# Patient Record
Sex: MALE | Race: WHITE | Employment: OTHER | URBAN - METROPOLITAN AREA
[De-identification: names, ages, dates, MRNs, and addresses within clinical notes are randomized per-mention and may not be internally consistent; named-entity substitution may affect disease eponyms.]

---

## 2017-01-04 ENCOUNTER — HOSPITAL ENCOUNTER (OUTPATIENT)
Dept: CARDIOLOGY | Age: 72
Discharge: HOME | End: 2017-01-04
Attending: INTERNAL MEDICINE | Admitting: INTERNAL MEDICINE

## 2017-01-04 DIAGNOSIS — R06.02 SHORTNESS OF BREATH: ICD-10-CM

## 2017-01-04 LAB
LV EF: 38 %
LVEF MODALITY: NORMAL

## 2017-01-10 ENCOUNTER — OFFICE VISIT (OUTPATIENT)
Dept: INTERNAL MEDICINE CLINIC | Age: 72
End: 2017-01-10

## 2017-01-10 VITALS — HEART RATE: 88 BPM | DIASTOLIC BLOOD PRESSURE: 60 MMHG | SYSTOLIC BLOOD PRESSURE: 122 MMHG | RESPIRATION RATE: 16 BRPM

## 2017-01-10 DIAGNOSIS — I50.21 ACUTE SYSTOLIC CONGESTIVE HEART FAILURE (HCC): Primary | ICD-10-CM

## 2017-01-10 PROCEDURE — 99213 OFFICE O/P EST LOW 20 MIN: CPT | Performed by: INTERNAL MEDICINE

## 2017-01-10 ASSESSMENT — PATIENT HEALTH QUESTIONNAIRE - PHQ9
1. LITTLE INTEREST OR PLEASURE IN DOING THINGS: 0
2. FEELING DOWN, DEPRESSED OR HOPELESS: 0
SUM OF ALL RESPONSES TO PHQ9 QUESTIONS 1 & 2: 0
SUM OF ALL RESPONSES TO PHQ QUESTIONS 1-9: 0

## 2017-01-12 ENCOUNTER — OFFICE VISIT (OUTPATIENT)
Dept: CARDIOLOGY CLINIC | Age: 72
End: 2017-01-12

## 2017-01-12 VITALS
DIASTOLIC BLOOD PRESSURE: 68 MMHG | SYSTOLIC BLOOD PRESSURE: 128 MMHG | HEIGHT: 67 IN | BODY MASS INDEX: 30.61 KG/M2 | HEART RATE: 76 BPM | WEIGHT: 195 LBS | RESPIRATION RATE: 16 BRPM

## 2017-01-12 DIAGNOSIS — I25.10 CORONARY ARTERY DISEASE INVOLVING NATIVE CORONARY ARTERY OF NATIVE HEART WITHOUT ANGINA PECTORIS: Primary | ICD-10-CM

## 2017-01-12 PROCEDURE — 99214 OFFICE O/P EST MOD 30 MIN: CPT | Performed by: INTERNAL MEDICINE

## 2017-01-16 ENCOUNTER — HOSPITAL ENCOUNTER (OUTPATIENT)
Dept: OTHER | Age: 72
Discharge: HOME | End: 2017-01-16
Attending: INTERNAL MEDICINE | Admitting: INTERNAL MEDICINE

## 2017-01-16 ENCOUNTER — HOSPITAL ENCOUNTER (OUTPATIENT)
Dept: LAB | Age: 72
Discharge: HOME | End: 2017-01-16
Attending: INTERNAL MEDICINE | Admitting: INTERNAL MEDICINE

## 2017-01-16 DIAGNOSIS — Z01.811 PRE-OP CHEST EXAM: ICD-10-CM

## 2017-01-16 LAB
ANION GAP SERPL CALCULATED.3IONS-SCNC: 10 MMOL/L (ref 4–16)
APTT: 50.3 SECONDS (ref 24–40)
BASOPHILS ABSOLUTE: 0.1 K/CU MM
BASOPHILS RELATIVE PERCENT: 0.8 % (ref 0–1)
BUN BLDV-MCNC: 45 MG/DL (ref 6–23)
CALCIUM SERPL-MCNC: 9.5 MG/DL (ref 8.3–10.6)
CHLORIDE BLD-SCNC: 100 MMOL/L (ref 99–110)
CO2: 27 MMOL/L (ref 21–32)
CREAT SERPL-MCNC: 2 MG/DL (ref 0.9–1.3)
DIFFERENTIAL TYPE: ABNORMAL
EOSINOPHILS ABSOLUTE: 0.1 K/CU MM
EOSINOPHILS RELATIVE PERCENT: 1.4 % (ref 0–3)
GFR AFRICAN AMERICAN: 40 ML/MIN/1.73M2
GFR NON-AFRICAN AMERICAN: 33 ML/MIN/1.73M2
GLUCOSE BLD-MCNC: 141 MG/DL (ref 70–140)
HCT VFR BLD CALC: 37.4 % (ref 42–52)
HEMOGLOBIN: 12.1 GM/DL (ref 13.5–18)
IMMATURE NEUTROPHIL %: 0.3 % (ref 0–0.43)
INR BLD: 1.02 INDEX
LYMPHOCYTES ABSOLUTE: 2 K/CU MM
LYMPHOCYTES RELATIVE PERCENT: 31.2 % (ref 24–44)
MCH RBC QN AUTO: 31.6 PG (ref 27–31)
MCHC RBC AUTO-ENTMCNC: 32.4 % (ref 32–36)
MCV RBC AUTO: 97.7 FL (ref 78–100)
MONOCYTES ABSOLUTE: 0.5 K/CU MM
MONOCYTES RELATIVE PERCENT: 8 % (ref 0–4)
PDW BLD-RTO: 12.8 % (ref 11.7–14.9)
PLATELET # BLD: 224 K/CU MM (ref 140–440)
PMV BLD AUTO: 10.5 FL (ref 7.5–11.1)
POTASSIUM SERPL-SCNC: 4.8 MMOL/L (ref 3.5–5.1)
PROTHROMBIN TIME: 12 SECONDS (ref 10–14.3)
RBC # BLD: 3.83 M/CU MM (ref 4.6–6.2)
SEGMENTED NEUTROPHILS ABSOLUTE COUNT: 3.6 K/CU MM
SEGMENTED NEUTROPHILS RELATIVE PERCENT: 58.3 % (ref 36–66)
SODIUM BLD-SCNC: 137 MMOL/L (ref 135–145)
TOTAL IMMATURE NEUTOROPHIL: 0.02 K/CU MM
WBC # BLD: 6.3 K/CU MM (ref 4–10.5)

## 2017-01-24 ENCOUNTER — TELEPHONE (OUTPATIENT)
Dept: INTERNAL MEDICINE CLINIC | Age: 72
End: 2017-01-24

## 2017-01-25 ENCOUNTER — OFFICE VISIT (OUTPATIENT)
Dept: CARDIOLOGY CLINIC | Age: 72
End: 2017-01-25

## 2017-01-25 VITALS
BODY MASS INDEX: 31.08 KG/M2 | HEIGHT: 67 IN | HEART RATE: 80 BPM | RESPIRATION RATE: 16 BRPM | SYSTOLIC BLOOD PRESSURE: 110 MMHG | DIASTOLIC BLOOD PRESSURE: 72 MMHG | WEIGHT: 198 LBS

## 2017-01-25 DIAGNOSIS — I25.10 CORONARY ARTERY DISEASE INVOLVING NATIVE CORONARY ARTERY OF NATIVE HEART WITHOUT ANGINA PECTORIS: Primary | ICD-10-CM

## 2017-01-25 PROCEDURE — G8419 CALC BMI OUT NRM PARAM NOF/U: HCPCS | Performed by: INTERNAL MEDICINE

## 2017-01-25 PROCEDURE — 4040F PNEUMOC VAC/ADMIN/RCVD: CPT | Performed by: INTERNAL MEDICINE

## 2017-01-25 PROCEDURE — G8484 FLU IMMUNIZE NO ADMIN: HCPCS | Performed by: INTERNAL MEDICINE

## 2017-01-25 PROCEDURE — 1036F TOBACCO NON-USER: CPT | Performed by: INTERNAL MEDICINE

## 2017-01-25 PROCEDURE — G8598 ASA/ANTIPLAT THER USED: HCPCS | Performed by: INTERNAL MEDICINE

## 2017-01-25 PROCEDURE — G8427 DOCREV CUR MEDS BY ELIG CLIN: HCPCS | Performed by: INTERNAL MEDICINE

## 2017-01-25 PROCEDURE — 3017F COLORECTAL CA SCREEN DOC REV: CPT | Performed by: INTERNAL MEDICINE

## 2017-01-25 PROCEDURE — 1123F ACP DISCUSS/DSCN MKR DOCD: CPT | Performed by: INTERNAL MEDICINE

## 2017-01-25 PROCEDURE — 99214 OFFICE O/P EST MOD 30 MIN: CPT | Performed by: INTERNAL MEDICINE

## 2017-02-14 ENCOUNTER — OFFICE VISIT (OUTPATIENT)
Dept: INTERNAL MEDICINE CLINIC | Age: 72
End: 2017-02-14

## 2017-02-14 VITALS
RESPIRATION RATE: 14 BRPM | TEMPERATURE: 97.8 F | BODY MASS INDEX: 31.08 KG/M2 | WEIGHT: 198 LBS | DIASTOLIC BLOOD PRESSURE: 64 MMHG | SYSTOLIC BLOOD PRESSURE: 126 MMHG | HEIGHT: 67 IN | HEART RATE: 72 BPM

## 2017-02-14 DIAGNOSIS — J00 ACUTE NASOPHARYNGITIS: Primary | ICD-10-CM

## 2017-02-14 PROCEDURE — 3017F COLORECTAL CA SCREEN DOC REV: CPT | Performed by: INTERNAL MEDICINE

## 2017-02-14 PROCEDURE — 1123F ACP DISCUSS/DSCN MKR DOCD: CPT | Performed by: INTERNAL MEDICINE

## 2017-02-14 PROCEDURE — 99213 OFFICE O/P EST LOW 20 MIN: CPT | Performed by: INTERNAL MEDICINE

## 2017-02-14 PROCEDURE — G8598 ASA/ANTIPLAT THER USED: HCPCS | Performed by: INTERNAL MEDICINE

## 2017-02-14 PROCEDURE — G8427 DOCREV CUR MEDS BY ELIG CLIN: HCPCS | Performed by: INTERNAL MEDICINE

## 2017-02-14 PROCEDURE — G8484 FLU IMMUNIZE NO ADMIN: HCPCS | Performed by: INTERNAL MEDICINE

## 2017-02-14 PROCEDURE — 1036F TOBACCO NON-USER: CPT | Performed by: INTERNAL MEDICINE

## 2017-02-14 PROCEDURE — 4040F PNEUMOC VAC/ADMIN/RCVD: CPT | Performed by: INTERNAL MEDICINE

## 2017-02-14 PROCEDURE — G8417 CALC BMI ABV UP PARAM F/U: HCPCS | Performed by: INTERNAL MEDICINE

## 2017-02-14 RX ORDER — GUAIFENESIN AND CODEINE PHOSPHATE 100; 10 MG/5ML; MG/5ML
5 SOLUTION ORAL 4 TIMES DAILY PRN
Qty: 120 ML | Refills: 0 | Status: SHIPPED | OUTPATIENT
Start: 2017-02-14 | End: 2017-02-21

## 2017-03-03 RX ORDER — LATANOPROST 50 UG/ML
1 SOLUTION/ DROPS OPHTHALMIC NIGHTLY
Qty: 1 BOTTLE | Refills: 6 | Status: SHIPPED | OUTPATIENT
Start: 2017-03-03

## 2017-03-23 ENCOUNTER — OFFICE VISIT (OUTPATIENT)
Dept: INTERNAL MEDICINE CLINIC | Age: 72
End: 2017-03-23

## 2017-03-23 VITALS — OXYGEN SATURATION: 98 % | SYSTOLIC BLOOD PRESSURE: 118 MMHG | DIASTOLIC BLOOD PRESSURE: 62 MMHG | HEART RATE: 84 BPM

## 2017-03-23 DIAGNOSIS — M25.511 ACUTE PAIN OF RIGHT SHOULDER: Primary | ICD-10-CM

## 2017-03-23 PROCEDURE — 99213 OFFICE O/P EST LOW 20 MIN: CPT | Performed by: NURSE PRACTITIONER

## 2017-03-23 PROCEDURE — 3017F COLORECTAL CA SCREEN DOC REV: CPT | Performed by: NURSE PRACTITIONER

## 2017-03-23 PROCEDURE — 96372 THER/PROPH/DIAG INJ SC/IM: CPT | Performed by: NURSE PRACTITIONER

## 2017-03-23 PROCEDURE — G8598 ASA/ANTIPLAT THER USED: HCPCS | Performed by: NURSE PRACTITIONER

## 2017-03-23 PROCEDURE — 4040F PNEUMOC VAC/ADMIN/RCVD: CPT | Performed by: NURSE PRACTITIONER

## 2017-03-23 PROCEDURE — G8427 DOCREV CUR MEDS BY ELIG CLIN: HCPCS | Performed by: NURSE PRACTITIONER

## 2017-03-23 PROCEDURE — G8417 CALC BMI ABV UP PARAM F/U: HCPCS | Performed by: NURSE PRACTITIONER

## 2017-03-23 PROCEDURE — 1036F TOBACCO NON-USER: CPT | Performed by: NURSE PRACTITIONER

## 2017-03-23 PROCEDURE — 1123F ACP DISCUSS/DSCN MKR DOCD: CPT | Performed by: NURSE PRACTITIONER

## 2017-03-23 PROCEDURE — G8484 FLU IMMUNIZE NO ADMIN: HCPCS | Performed by: NURSE PRACTITIONER

## 2017-03-23 RX ORDER — METHYLPREDNISOLONE ACETATE 80 MG/ML
80 INJECTION, SUSPENSION INTRA-ARTICULAR; INTRALESIONAL; INTRAMUSCULAR; SOFT TISSUE ONCE
Status: COMPLETED | OUTPATIENT
Start: 2017-03-23 | End: 2017-03-23

## 2017-03-23 RX ADMIN — METHYLPREDNISOLONE ACETATE 80 MG: 80 INJECTION, SUSPENSION INTRA-ARTICULAR; INTRALESIONAL; INTRAMUSCULAR; SOFT TISSUE at 15:55

## 2017-03-29 DIAGNOSIS — M25.511 ACUTE PAIN OF RIGHT SHOULDER: ICD-10-CM

## 2017-04-26 ENCOUNTER — TELEPHONE (OUTPATIENT)
Dept: INTERNAL MEDICINE CLINIC | Age: 72
End: 2017-04-26

## 2017-04-26 DIAGNOSIS — R06.02 SOB (SHORTNESS OF BREATH): ICD-10-CM

## 2017-04-26 RX ORDER — FUROSEMIDE 20 MG/1
20 TABLET ORAL EVERY OTHER DAY
Qty: 30 TABLET | Refills: 11 | Status: SHIPPED | OUTPATIENT
Start: 2017-04-26 | End: 2018-01-22 | Stop reason: SDUPTHER

## 2017-04-26 RX ORDER — FUROSEMIDE 20 MG/1
20 TABLET ORAL EVERY OTHER DAY
Qty: 30 TABLET | Refills: 11 | Status: SHIPPED | OUTPATIENT
Start: 2017-04-26 | End: 2017-04-26 | Stop reason: SDUPTHER

## 2017-05-16 ENCOUNTER — OFFICE VISIT (OUTPATIENT)
Dept: INTERNAL MEDICINE CLINIC | Age: 72
End: 2017-05-16

## 2017-05-16 VITALS
BODY MASS INDEX: 30.07 KG/M2 | HEART RATE: 68 BPM | WEIGHT: 192 LBS | DIASTOLIC BLOOD PRESSURE: 62 MMHG | SYSTOLIC BLOOD PRESSURE: 120 MMHG | RESPIRATION RATE: 16 BRPM

## 2017-05-16 DIAGNOSIS — I10 ESSENTIAL HYPERTENSION: ICD-10-CM

## 2017-05-16 DIAGNOSIS — E78.2 MIXED HYPERLIPIDEMIA: ICD-10-CM

## 2017-05-16 DIAGNOSIS — Z00.00 PREVENTATIVE HEALTH CARE: ICD-10-CM

## 2017-05-16 DIAGNOSIS — E11.9 TYPE 2 DIABETES MELLITUS WITHOUT COMPLICATION, WITHOUT LONG-TERM CURRENT USE OF INSULIN (HCC): Primary | ICD-10-CM

## 2017-05-16 DIAGNOSIS — K21.9 GASTROESOPHAGEAL REFLUX DISEASE WITHOUT ESOPHAGITIS: ICD-10-CM

## 2017-05-16 DIAGNOSIS — I25.10 CORONARY ARTERY DISEASE INVOLVING NATIVE CORONARY ARTERY OF NATIVE HEART WITHOUT ANGINA PECTORIS: ICD-10-CM

## 2017-05-16 DIAGNOSIS — E11.9 TYPE 2 DIABETES MELLITUS WITHOUT COMPLICATION, WITHOUT LONG-TERM CURRENT USE OF INSULIN (HCC): ICD-10-CM

## 2017-05-16 DIAGNOSIS — N18.2 CHRONIC RENAL FAILURE, STAGE 2 (MILD): ICD-10-CM

## 2017-05-16 LAB
A/G RATIO: 1.7 (ref 1.1–2.2)
ALBUMIN SERPL-MCNC: 4.3 G/DL (ref 3.4–5)
ALP BLD-CCNC: 78 U/L (ref 40–129)
ALT SERPL-CCNC: 25 U/L (ref 10–40)
ANION GAP SERPL CALCULATED.3IONS-SCNC: 19 MMOL/L (ref 3–16)
AST SERPL-CCNC: 22 U/L (ref 15–37)
BASOPHILS ABSOLUTE: 0.1 K/UL (ref 0–0.2)
BASOPHILS RELATIVE PERCENT: 1 %
BILIRUB SERPL-MCNC: 0.4 MG/DL (ref 0–1)
BUN BLDV-MCNC: 44 MG/DL (ref 7–20)
CALCIUM SERPL-MCNC: 9 MG/DL (ref 8.3–10.6)
CHLORIDE BLD-SCNC: 105 MMOL/L (ref 99–110)
CHOLESTEROL, TOTAL: 137 MG/DL (ref 0–199)
CO2: 20 MMOL/L (ref 21–32)
CREAT SERPL-MCNC: 1.7 MG/DL (ref 0.8–1.3)
EOSINOPHILS ABSOLUTE: 0.1 K/UL (ref 0–0.6)
EOSINOPHILS RELATIVE PERCENT: 1.4 %
GFR AFRICAN AMERICAN: 48
GFR NON-AFRICAN AMERICAN: 40
GLOBULIN: 2.5 G/DL
GLUCOSE BLD-MCNC: 139 MG/DL (ref 70–99)
HCT VFR BLD CALC: 37 % (ref 40.5–52.5)
HDLC SERPL-MCNC: 38 MG/DL (ref 40–60)
HEMOGLOBIN: 11.8 G/DL (ref 13.5–17.5)
HEPATITIS C ANTIBODY INTERPRETATION: NORMAL
LDL CHOLESTEROL CALCULATED: 78 MG/DL
LYMPHOCYTES ABSOLUTE: 1.7 K/UL (ref 1–5.1)
LYMPHOCYTES RELATIVE PERCENT: 29.5 %
MCH RBC QN AUTO: 31.4 PG (ref 26–34)
MCHC RBC AUTO-ENTMCNC: 31.8 G/DL (ref 31–36)
MCV RBC AUTO: 98.9 FL (ref 80–100)
MONOCYTES ABSOLUTE: 0.5 K/UL (ref 0–1.3)
MONOCYTES RELATIVE PERCENT: 9 %
NEUTROPHILS ABSOLUTE: 3.5 K/UL (ref 1.7–7.7)
NEUTROPHILS RELATIVE PERCENT: 59.1 %
PDW BLD-RTO: 15.2 % (ref 12.4–15.4)
PLATELET # BLD: 180 K/UL (ref 135–450)
PMV BLD AUTO: 9.3 FL (ref 5–10.5)
POTASSIUM SERPL-SCNC: 4.9 MMOL/L (ref 3.5–5.1)
RBC # BLD: 3.74 M/UL (ref 4.2–5.9)
SODIUM BLD-SCNC: 144 MMOL/L (ref 136–145)
TOTAL PROTEIN: 6.8 G/DL (ref 6.4–8.2)
TRIGL SERPL-MCNC: 104 MG/DL (ref 0–150)
VLDLC SERPL CALC-MCNC: 21 MG/DL
WBC # BLD: 5.9 K/UL (ref 4–11)

## 2017-05-16 PROCEDURE — 3017F COLORECTAL CA SCREEN DOC REV: CPT | Performed by: INTERNAL MEDICINE

## 2017-05-16 PROCEDURE — G8417 CALC BMI ABV UP PARAM F/U: HCPCS | Performed by: INTERNAL MEDICINE

## 2017-05-16 PROCEDURE — G8598 ASA/ANTIPLAT THER USED: HCPCS | Performed by: INTERNAL MEDICINE

## 2017-05-16 PROCEDURE — G8427 DOCREV CUR MEDS BY ELIG CLIN: HCPCS | Performed by: INTERNAL MEDICINE

## 2017-05-16 PROCEDURE — 1036F TOBACCO NON-USER: CPT | Performed by: INTERNAL MEDICINE

## 2017-05-16 PROCEDURE — 4040F PNEUMOC VAC/ADMIN/RCVD: CPT | Performed by: INTERNAL MEDICINE

## 2017-05-16 PROCEDURE — 3045F PR MOST RECENT HEMOGLOBIN A1C LEVEL 7.0-9.0%: CPT | Performed by: INTERNAL MEDICINE

## 2017-05-16 PROCEDURE — 99214 OFFICE O/P EST MOD 30 MIN: CPT | Performed by: INTERNAL MEDICINE

## 2017-05-16 PROCEDURE — 1123F ACP DISCUSS/DSCN MKR DOCD: CPT | Performed by: INTERNAL MEDICINE

## 2017-05-16 RX ORDER — GLIPIZIDE 10 MG/1
10 TABLET, FILM COATED, EXTENDED RELEASE ORAL DAILY
Qty: 30 TABLET | Refills: 11
Start: 2017-05-16 | End: 2017-11-16 | Stop reason: DRUGHIGH

## 2017-05-17 LAB
ESTIMATED AVERAGE GLUCOSE: 168.6 MG/DL
HBA1C MFR BLD: 7.5 %

## 2017-06-27 ENCOUNTER — OFFICE VISIT (OUTPATIENT)
Dept: INTERNAL MEDICINE CLINIC | Age: 72
End: 2017-06-27

## 2017-06-27 ENCOUNTER — HOSPITAL ENCOUNTER (OUTPATIENT)
Dept: CT IMAGING | Age: 72
Discharge: HOME | End: 2017-06-27
Attending: INTERNAL MEDICINE | Admitting: INTERNAL MEDICINE

## 2017-06-27 VITALS
DIASTOLIC BLOOD PRESSURE: 60 MMHG | BODY MASS INDEX: 30.7 KG/M2 | RESPIRATION RATE: 18 BRPM | HEART RATE: 76 BPM | WEIGHT: 196 LBS | SYSTOLIC BLOOD PRESSURE: 122 MMHG | OXYGEN SATURATION: 99 %

## 2017-06-27 DIAGNOSIS — R10.32 LLQ ABDOMINAL PAIN: Primary | ICD-10-CM

## 2017-06-27 DIAGNOSIS — R10.32 LLQ ABDOMINAL PAIN: ICD-10-CM

## 2017-06-27 DIAGNOSIS — R10.32 LLQ PAIN: ICD-10-CM

## 2017-06-27 LAB
A/G RATIO: 1.7 (ref 1.1–2.2)
ALBUMIN SERPL-MCNC: 4.3 G/DL (ref 3.4–5)
ALP BLD-CCNC: 77 U/L (ref 40–129)
ALT SERPL-CCNC: 25 U/L (ref 10–40)
ANION GAP SERPL CALCULATED.3IONS-SCNC: 15 MMOL/L (ref 3–16)
AST SERPL-CCNC: 21 U/L (ref 15–37)
BASOPHILS ABSOLUTE: 0 K/UL (ref 0–0.2)
BASOPHILS RELATIVE PERCENT: 0.5 %
BILIRUB SERPL-MCNC: 0.4 MG/DL (ref 0–1)
BUN BLDV-MCNC: 32 MG/DL (ref 7–20)
CALCIUM SERPL-MCNC: 9.3 MG/DL (ref 8.3–10.6)
CHLORIDE BLD-SCNC: 101 MMOL/L (ref 99–110)
CO2: 23 MMOL/L (ref 21–32)
CREAT SERPL-MCNC: 1.8 MG/DL (ref 0.8–1.3)
EOSINOPHILS ABSOLUTE: 0.1 K/UL (ref 0–0.6)
EOSINOPHILS RELATIVE PERCENT: 1.3 %
GFR AFRICAN AMERICAN: 45
GFR NON-AFRICAN AMERICAN: 37
GLOBULIN: 2.6 G/DL
GLUCOSE BLD-MCNC: 186 MG/DL (ref 70–99)
HCT VFR BLD CALC: 36.3 % (ref 40.5–52.5)
HEMOGLOBIN: 11.7 G/DL (ref 13.5–17.5)
LYMPHOCYTES ABSOLUTE: 1.6 K/UL (ref 1–5.1)
LYMPHOCYTES RELATIVE PERCENT: 23.8 %
MCH RBC QN AUTO: 31.9 PG (ref 26–34)
MCHC RBC AUTO-ENTMCNC: 32.1 G/DL (ref 31–36)
MCV RBC AUTO: 99.4 FL (ref 80–100)
MONOCYTES ABSOLUTE: 0.6 K/UL (ref 0–1.3)
MONOCYTES RELATIVE PERCENT: 8.7 %
NEUTROPHILS ABSOLUTE: 4.4 K/UL (ref 1.7–7.7)
NEUTROPHILS RELATIVE PERCENT: 65.7 %
PDW BLD-RTO: 14.3 % (ref 12.4–15.4)
PLATELET # BLD: 166 K/UL (ref 135–450)
PMV BLD AUTO: 9.6 FL (ref 5–10.5)
POTASSIUM SERPL-SCNC: 5.6 MMOL/L (ref 3.5–5.1)
RBC # BLD: 3.65 M/UL (ref 4.2–5.9)
SODIUM BLD-SCNC: 139 MMOL/L (ref 136–145)
TOTAL PROTEIN: 6.9 G/DL (ref 6.4–8.2)
WBC # BLD: 6.8 K/UL (ref 4–11)

## 2017-06-27 PROCEDURE — 4040F PNEUMOC VAC/ADMIN/RCVD: CPT | Performed by: INTERNAL MEDICINE

## 2017-06-27 PROCEDURE — G8427 DOCREV CUR MEDS BY ELIG CLIN: HCPCS | Performed by: INTERNAL MEDICINE

## 2017-06-27 PROCEDURE — 1123F ACP DISCUSS/DSCN MKR DOCD: CPT | Performed by: INTERNAL MEDICINE

## 2017-06-27 PROCEDURE — 99214 OFFICE O/P EST MOD 30 MIN: CPT | Performed by: INTERNAL MEDICINE

## 2017-06-27 PROCEDURE — G8598 ASA/ANTIPLAT THER USED: HCPCS | Performed by: INTERNAL MEDICINE

## 2017-06-27 PROCEDURE — 3017F COLORECTAL CA SCREEN DOC REV: CPT | Performed by: INTERNAL MEDICINE

## 2017-06-27 PROCEDURE — 1036F TOBACCO NON-USER: CPT | Performed by: INTERNAL MEDICINE

## 2017-06-27 PROCEDURE — G8417 CALC BMI ABV UP PARAM F/U: HCPCS | Performed by: INTERNAL MEDICINE

## 2017-06-28 DIAGNOSIS — E87.5 HYPERKALEMIA: Primary | ICD-10-CM

## 2017-06-28 DIAGNOSIS — R10.32 LLQ PAIN: ICD-10-CM

## 2017-06-29 DIAGNOSIS — E87.5 HYPERKALEMIA: ICD-10-CM

## 2017-06-29 LAB
ANION GAP SERPL CALCULATED.3IONS-SCNC: 15 MMOL/L (ref 3–16)
BUN BLDV-MCNC: 36 MG/DL (ref 7–20)
CALCIUM SERPL-MCNC: 9.4 MG/DL (ref 8.3–10.6)
CHLORIDE BLD-SCNC: 102 MMOL/L (ref 99–110)
CO2: 22 MMOL/L (ref 21–32)
CREAT SERPL-MCNC: 1.9 MG/DL (ref 0.8–1.3)
GFR AFRICAN AMERICAN: 43
GFR NON-AFRICAN AMERICAN: 35
GLUCOSE BLD-MCNC: 118 MG/DL (ref 70–99)
POTASSIUM SERPL-SCNC: 5.2 MMOL/L (ref 3.5–5.1)
SODIUM BLD-SCNC: 139 MMOL/L (ref 136–145)

## 2017-07-12 ENCOUNTER — HOSPITAL ENCOUNTER (OUTPATIENT)
Dept: ULTRASOUND IMAGING | Age: 72
Discharge: HOME | End: 2017-07-12
Attending: INTERNAL MEDICINE | Admitting: INTERNAL MEDICINE

## 2017-07-12 DIAGNOSIS — R10.32 LLQ ABDOMINAL PAIN: Primary | ICD-10-CM

## 2017-07-12 DIAGNOSIS — R10.32 LLQ PAIN: ICD-10-CM

## 2017-07-12 DIAGNOSIS — R11.2 NAUSEA AND VOMITING, INTRACTABILITY OF VOMITING NOT SPECIFIED, UNSPECIFIED VOMITING TYPE: ICD-10-CM

## 2017-07-12 DIAGNOSIS — R10.32 LEFT LOWER QUADRANT PAIN: ICD-10-CM

## 2017-07-12 DIAGNOSIS — R10.9 ABDOMINAL PAIN, UNSPECIFIED LOCATION: Primary | ICD-10-CM

## 2017-07-18 ENCOUNTER — TELEPHONE (OUTPATIENT)
Dept: INTERNAL MEDICINE CLINIC | Age: 72
End: 2017-07-18

## 2017-07-25 ENCOUNTER — TELEPHONE (OUTPATIENT)
Dept: CARDIOLOGY CLINIC | Age: 72
End: 2017-07-25

## 2017-08-03 ENCOUNTER — HOSPITAL ENCOUNTER (OUTPATIENT)
Dept: PREADMISSION TESTING | Age: 72
Discharge: HOME | End: 2017-08-03
Attending: SURGERY | Admitting: SURGERY

## 2017-08-03 VITALS
WEIGHT: 194 LBS | TEMPERATURE: 97 F | SYSTOLIC BLOOD PRESSURE: 151 MMHG | HEART RATE: 76 BPM | DIASTOLIC BLOOD PRESSURE: 70 MMHG | OXYGEN SATURATION: 97 % | RESPIRATION RATE: 16 BRPM | BODY MASS INDEX: 31.18 KG/M2 | HEIGHT: 66 IN

## 2017-08-03 LAB
ANION GAP SERPL CALCULATED.3IONS-SCNC: 15 MMOL/L (ref 4–16)
BUN BLDV-MCNC: 26 MG/DL (ref 6–23)
CALCIUM SERPL-MCNC: 9.4 MG/DL (ref 8.3–10.6)
CHLORIDE BLD-SCNC: 102 MMOL/L (ref 99–110)
CO2: 23 MMOL/L (ref 21–32)
CREAT SERPL-MCNC: 1.8 MG/DL (ref 0.9–1.3)
EKG ATRIAL RATE: 68 BPM
EKG DIAGNOSIS: NORMAL
EKG P AXIS: 54 DEGREES
EKG P-R INTERVAL: 134 MS
EKG Q-T INTERVAL: 386 MS
EKG QRS DURATION: 78 MS
EKG QTC CALCULATION (BAZETT): 410 MS
EKG R AXIS: 42 DEGREES
EKG T AXIS: 41 DEGREES
EKG VENTRICULAR RATE: 68 BPM
GFR AFRICAN AMERICAN: 45 ML/MIN/1.73M2
GFR NON-AFRICAN AMERICAN: 37 ML/MIN/1.73M2
GLUCOSE BLD-MCNC: 141 MG/DL (ref 70–140)
HCT VFR BLD CALC: 34.9 % (ref 42–52)
HEMOGLOBIN: 11.7 GM/DL (ref 13.5–18)
MCH RBC QN AUTO: 32.9 PG (ref 27–31)
MCHC RBC AUTO-ENTMCNC: 33.5 % (ref 32–36)
MCV RBC AUTO: 98 FL (ref 78–100)
PDW BLD-RTO: 13.2 % (ref 11.7–14.9)
PLATELET # BLD: 198 K/CU MM (ref 140–440)
PMV BLD AUTO: 10.3 FL (ref 7.5–11.1)
POTASSIUM SERPL-SCNC: 5 MMOL/L (ref 3.5–5.1)
RBC # BLD: 3.56 M/CU MM (ref 4.6–6.2)
SODIUM BLD-SCNC: 140 MMOL/L (ref 135–145)
WBC # BLD: 6.2 K/CU MM (ref 4–10.5)

## 2017-09-11 ENCOUNTER — OFFICE VISIT (OUTPATIENT)
Dept: INTERNAL MEDICINE CLINIC | Age: 72
End: 2017-09-11

## 2017-09-11 VITALS
OXYGEN SATURATION: 98 % | DIASTOLIC BLOOD PRESSURE: 60 MMHG | SYSTOLIC BLOOD PRESSURE: 118 MMHG | RESPIRATION RATE: 16 BRPM | HEART RATE: 86 BPM | WEIGHT: 196.2 LBS | BODY MASS INDEX: 32.69 KG/M2

## 2017-09-11 DIAGNOSIS — M25.561 ACUTE PAIN OF RIGHT KNEE: Primary | ICD-10-CM

## 2017-09-11 DIAGNOSIS — E78.00 PURE HYPERCHOLESTEROLEMIA: ICD-10-CM

## 2017-09-11 DIAGNOSIS — K21.9 GASTROESOPHAGEAL REFLUX DISEASE WITHOUT ESOPHAGITIS: ICD-10-CM

## 2017-09-11 DIAGNOSIS — I10 ESSENTIAL HYPERTENSION: ICD-10-CM

## 2017-09-11 PROCEDURE — 3017F COLORECTAL CA SCREEN DOC REV: CPT | Performed by: INTERNAL MEDICINE

## 2017-09-11 PROCEDURE — 90662 IIV NO PRSV INCREASED AG IM: CPT | Performed by: INTERNAL MEDICINE

## 2017-09-11 PROCEDURE — 90471 IMMUNIZATION ADMIN: CPT | Performed by: INTERNAL MEDICINE

## 2017-09-11 PROCEDURE — 99213 OFFICE O/P EST LOW 20 MIN: CPT | Performed by: INTERNAL MEDICINE

## 2017-09-11 PROCEDURE — 1123F ACP DISCUSS/DSCN MKR DOCD: CPT | Performed by: INTERNAL MEDICINE

## 2017-09-11 PROCEDURE — G8417 CALC BMI ABV UP PARAM F/U: HCPCS | Performed by: INTERNAL MEDICINE

## 2017-09-11 PROCEDURE — G8427 DOCREV CUR MEDS BY ELIG CLIN: HCPCS | Performed by: INTERNAL MEDICINE

## 2017-09-11 PROCEDURE — G8598 ASA/ANTIPLAT THER USED: HCPCS | Performed by: INTERNAL MEDICINE

## 2017-09-11 PROCEDURE — 1036F TOBACCO NON-USER: CPT | Performed by: INTERNAL MEDICINE

## 2017-09-11 PROCEDURE — 4040F PNEUMOC VAC/ADMIN/RCVD: CPT | Performed by: INTERNAL MEDICINE

## 2017-09-11 RX ORDER — LISINOPRIL 20 MG/1
20 TABLET ORAL EVERY MORNING
Qty: 30 TABLET | Refills: 11 | Status: SHIPPED | OUTPATIENT
Start: 2017-09-11 | End: 2018-03-13 | Stop reason: SDUPTHER

## 2017-09-11 RX ORDER — ATORVASTATIN CALCIUM 80 MG/1
80 TABLET, FILM COATED ORAL NIGHTLY
Qty: 30 TABLET | Refills: 11 | Status: SHIPPED | OUTPATIENT
Start: 2017-09-11 | End: 2018-03-13 | Stop reason: SDUPTHER

## 2017-09-11 RX ORDER — PIOGLITAZONEHYDROCHLORIDE 30 MG/1
30 TABLET ORAL DAILY
Qty: 30 TABLET | Refills: 11 | Status: SHIPPED | OUTPATIENT
Start: 2017-09-11 | End: 2017-12-31 | Stop reason: SDUPTHER

## 2017-09-11 RX ORDER — OMEPRAZOLE 20 MG/1
20 CAPSULE, DELAYED RELEASE ORAL EVERY MORNING
Qty: 30 CAPSULE | Refills: 11 | Status: SHIPPED | OUTPATIENT
Start: 2017-09-11 | End: 2018-03-13 | Stop reason: SDUPTHER

## 2017-09-11 RX ORDER — TRAMADOL HYDROCHLORIDE 50 MG/1
50 TABLET ORAL EVERY 6 HOURS PRN
Qty: 25 TABLET | Refills: 1 | Status: SHIPPED | OUTPATIENT
Start: 2017-09-11 | End: 2017-11-16

## 2017-10-29 DIAGNOSIS — E11.9 TYPE 2 DIABETES MELLITUS WITHOUT COMPLICATION, WITHOUT LONG-TERM CURRENT USE OF INSULIN (HCC): ICD-10-CM

## 2017-10-30 RX ORDER — GLIPIZIDE 10 MG/1
TABLET, FILM COATED, EXTENDED RELEASE ORAL
Qty: 60 TABLET | Refills: 11 | Status: SHIPPED | OUTPATIENT
Start: 2017-10-30 | End: 2018-03-13 | Stop reason: SDUPTHER

## 2017-11-16 ENCOUNTER — OFFICE VISIT (OUTPATIENT)
Dept: INTERNAL MEDICINE CLINIC | Age: 72
End: 2017-11-16

## 2017-11-16 VITALS
WEIGHT: 198 LBS | HEART RATE: 68 BPM | BODY MASS INDEX: 32.99 KG/M2 | SYSTOLIC BLOOD PRESSURE: 124 MMHG | RESPIRATION RATE: 16 BRPM | DIASTOLIC BLOOD PRESSURE: 70 MMHG

## 2017-11-16 DIAGNOSIS — K21.9 GASTROESOPHAGEAL REFLUX DISEASE WITHOUT ESOPHAGITIS: ICD-10-CM

## 2017-11-16 DIAGNOSIS — I25.10 CORONARY ARTERY DISEASE INVOLVING NATIVE CORONARY ARTERY OF NATIVE HEART WITHOUT ANGINA PECTORIS: ICD-10-CM

## 2017-11-16 DIAGNOSIS — I50.22 CHRONIC SYSTOLIC CONGESTIVE HEART FAILURE (HCC): ICD-10-CM

## 2017-11-16 DIAGNOSIS — I73.9 PAD (PERIPHERAL ARTERY DISEASE) (HCC): ICD-10-CM

## 2017-11-16 DIAGNOSIS — N18.2 CHRONIC RENAL IMPAIRMENT, STAGE 2 (MILD): ICD-10-CM

## 2017-11-16 DIAGNOSIS — E11.42 TYPE 2 DIABETES MELLITUS WITH DIABETIC POLYNEUROPATHY, UNSPECIFIED LONG TERM INSULIN USE STATUS: ICD-10-CM

## 2017-11-16 DIAGNOSIS — I10 ESSENTIAL HYPERTENSION: Primary | ICD-10-CM

## 2017-11-16 DIAGNOSIS — E78.2 MIXED HYPERLIPIDEMIA: ICD-10-CM

## 2017-11-16 PROCEDURE — 3017F COLORECTAL CA SCREEN DOC REV: CPT | Performed by: INTERNAL MEDICINE

## 2017-11-16 PROCEDURE — 1036F TOBACCO NON-USER: CPT | Performed by: INTERNAL MEDICINE

## 2017-11-16 PROCEDURE — G8484 FLU IMMUNIZE NO ADMIN: HCPCS | Performed by: INTERNAL MEDICINE

## 2017-11-16 PROCEDURE — 4040F PNEUMOC VAC/ADMIN/RCVD: CPT | Performed by: INTERNAL MEDICINE

## 2017-11-16 PROCEDURE — G8598 ASA/ANTIPLAT THER USED: HCPCS | Performed by: INTERNAL MEDICINE

## 2017-11-16 PROCEDURE — 3045F PR MOST RECENT HEMOGLOBIN A1C LEVEL 7.0-9.0%: CPT | Performed by: INTERNAL MEDICINE

## 2017-11-16 PROCEDURE — 1123F ACP DISCUSS/DSCN MKR DOCD: CPT | Performed by: INTERNAL MEDICINE

## 2017-11-16 PROCEDURE — G8417 CALC BMI ABV UP PARAM F/U: HCPCS | Performed by: INTERNAL MEDICINE

## 2017-11-16 PROCEDURE — G8427 DOCREV CUR MEDS BY ELIG CLIN: HCPCS | Performed by: INTERNAL MEDICINE

## 2017-11-16 PROCEDURE — 99214 OFFICE O/P EST MOD 30 MIN: CPT | Performed by: INTERNAL MEDICINE

## 2017-11-16 NOTE — PROGRESS NOTES
Diego Lacey  [birth year]  11/16/17    SUBJECTIVE:    Patient compliant with medications for diabetes. Blood sugars have averaged around 100-150, with pt checking blood sugar 1 time daily. Patient has had no episodes of significant hypoglycemia. He is having diarrhea with the metformin. The patient is taking hypertensive medications compliantly without side effects. Denies chest pain, dyspnea, edema, or TIA's. Patient denies any chest pain, shortness of breath, myalgias, Patient is tolerating cholesterol medications without difficulty. He has been following with Dr Velvet Gibbons for CHF. He has a small amount of edema. OBJECTIVE:    /70   Pulse 68   Resp 16   Wt 198 lb (89.8 kg)   BMI 32.99 kg/m²     Physical Exam   Constitutional: He is oriented to person, place, and time. He appears well-developed and well-nourished. Eyes: Conjunctivae are normal. No scleral icterus. Neck: Neck supple. No JVD present. No tracheal deviation present. No thyromegaly present. Cardiovascular: Normal rate, regular rhythm, normal heart sounds and intact distal pulses. Pulses:       Dorsalis pedis pulses are 1+ on the right side, and 1+ on the left side. Posterior tibial pulses are 1+ on the right side, and 1+ on the left side. Pulmonary/Chest: Effort normal and breath sounds normal. No respiratory distress. Abdominal: Soft. He exhibits no distension and no mass. There is no hepatosplenomegaly. There is no tenderness. There is no rebound, no guarding and no CVA tenderness. Musculoskeletal: He exhibits no edema. Lymphadenopathy:     He has no cervical adenopathy. Neurological: He is alert and oriented to person, place, and time. No sensory deficit (to monofilament). Nonfocal   Skin: Skin is warm, dry and intact. No cyanosis. Nails show no clubbing. No foot ulcerations   Psychiatric: He has a normal mood and affect. His behavior is normal. Judgment normal.       ASSESSMENT:    1.  Essential

## 2017-11-17 LAB
A/G RATIO: 1.5 (CALC) (ref 0.8–2.6)
ALBUMIN SERPL-MCNC: 4.4 GM/DL (ref 3.5–5.2)
ALP BLD-CCNC: 79 U/L (ref 23–144)
ALT SERPL-CCNC: 37 U/L (ref 0–60)
AST SERPL-CCNC: 30 U/L (ref 0–55)
BASOPHILS ABSOLUTE: 0 K/MM3 (ref 0–0.3)
BASOPHILS RELATIVE PERCENT: 0.4 % (ref 0–2)
BILIRUB SERPL-MCNC: 0.4 MG/DL (ref 0–1.2)
BUN / CREAT RATIO: 24 (CALC) (ref 7–25)
BUN BLDV-MCNC: 39 MG/DL (ref 3–29)
CALCIUM SERPL-MCNC: 9.2 MG/DL (ref 8.5–10.5)
CHLORIDE BLD-SCNC: 101 MEQ/L (ref 96–110)
CHOLESTEROL, TOTAL: 135 MG/DL
CO2: 26 MEQ/L (ref 19–32)
COPY(IES) SENT TO:: NORMAL
CREAT SERPL-MCNC: 1.6 MG/DL
EOSINOPHILS ABSOLUTE: 0.1 K/MM3 (ref 0–0.6)
EOSINOPHILS RELATIVE PERCENT: 1.3 % (ref 0–7)
GFR SERPL CREATININE-BSD FRML MDRD: 43 ML/MIN/1.73M2
GLOBULIN: 2.9 GM/DL (CALC) (ref 1.9–3.6)
GLUCOSE BLD-MCNC: 132 MG/DL
HBA1C MFR BLD: 7.2 % TOTAL HGB
HCT VFR BLD CALC: 36.2 % (ref 41–50)
HDLC SERPL-MCNC: 40 MG/DL
HEMOGLOBIN: 11.8 G/DL (ref 13.8–17.2)
LDL CHOLESTEROL: 74 MG/DL (CALC)
LEUKOCYTES, UA: 6 K/MM3 (ref 3.8–10.8)
LYMPHOCYTES ABSOLUTE: 1.9 K/MM3 (ref 0.9–4.1)
LYMPHOCYTES RELATIVE PERCENT: 31.6 % (ref 14–51)
MCH RBC QN AUTO: 31.5 PG (ref 27–33)
MCHC RBC AUTO-ENTMCNC: 32.7 G/DL (ref 32–36)
MCV RBC AUTO: 96.2 FL (ref 80–100)
MONOCYTES ABSOLUTE: 0.6 K/MM3 (ref 0.2–1.1)
MONOCYTES RELATIVE PERCENT: 9.7 % (ref 0–14)
NEUTROPHILS ABSOLUTE: 3.4 K/MM3 (ref 1.5–7.8)
PDW BLD-RTO: 14.7 % (ref 9–15)
PLATELET # BLD: 183 K/MM3 (ref 130–400)
POTASSIUM SERPL-SCNC: 5.5 MEQ/L (ref 3.4–5.3)
RBC # BLD: 3.76 M/MM3 (ref 4.4–5.8)
SEGMENTED NEUTROPHILS RELATIVE PERCENT: 57 % (ref 40–76)
SODIUM BLD-SCNC: 141 MEQ/L (ref 135–148)
TOTAL PROTEIN: 7.3 GM/DL (ref 6–8.3)
TRIGL SERPL-MCNC: 107 MG/DL
VLDLC SERPL CALC-MCNC: 21 MG/DL (CALC) (ref 4–38)

## 2018-01-01 DIAGNOSIS — Z79.4 TYPE 2 DIABETES MELLITUS WITHOUT COMPLICATION, WITH LONG-TERM CURRENT USE OF INSULIN (HCC): ICD-10-CM

## 2018-01-01 DIAGNOSIS — E11.9 TYPE 2 DIABETES MELLITUS WITHOUT COMPLICATION, WITH LONG-TERM CURRENT USE OF INSULIN (HCC): ICD-10-CM

## 2018-01-02 RX ORDER — PIOGLITAZONEHYDROCHLORIDE 30 MG/1
TABLET ORAL
Qty: 30 TABLET | Refills: 11 | Status: SHIPPED | OUTPATIENT
Start: 2018-01-02 | End: 2018-02-23

## 2018-01-16 DIAGNOSIS — E11.42 TYPE 2 DIABETES MELLITUS WITH DIABETIC POLYNEUROPATHY, UNSPECIFIED LONG TERM INSULIN USE STATUS: ICD-10-CM

## 2018-01-22 ENCOUNTER — OFFICE VISIT (OUTPATIENT)
Dept: INTERNAL MEDICINE CLINIC | Age: 73
End: 2018-01-22

## 2018-01-22 VITALS
HEART RATE: 72 BPM | DIASTOLIC BLOOD PRESSURE: 72 MMHG | RESPIRATION RATE: 18 BRPM | SYSTOLIC BLOOD PRESSURE: 128 MMHG | BODY MASS INDEX: 32.82 KG/M2 | WEIGHT: 197 LBS

## 2018-01-22 DIAGNOSIS — R19.7 DIARRHEA, UNSPECIFIED TYPE: Primary | ICD-10-CM

## 2018-01-22 DIAGNOSIS — R06.02 SOB (SHORTNESS OF BREATH): ICD-10-CM

## 2018-01-22 DIAGNOSIS — R19.7 DIARRHEA, UNSPECIFIED TYPE: ICD-10-CM

## 2018-01-22 LAB
ANION GAP SERPL CALCULATED.3IONS-SCNC: 14 MMOL/L (ref 3–16)
BUN BLDV-MCNC: 35 MG/DL (ref 7–20)
CALCIUM SERPL-MCNC: 9.2 MG/DL (ref 8.3–10.6)
CHLORIDE BLD-SCNC: 102 MMOL/L (ref 99–110)
CO2: 23 MMOL/L (ref 21–32)
CREAT SERPL-MCNC: 1.7 MG/DL (ref 0.8–1.3)
GFR AFRICAN AMERICAN: 48
GFR NON-AFRICAN AMERICAN: 40
GLUCOSE BLD-MCNC: 113 MG/DL (ref 70–99)
POTASSIUM SERPL-SCNC: 5.1 MMOL/L (ref 3.5–5.1)
SODIUM BLD-SCNC: 139 MMOL/L (ref 136–145)

## 2018-01-22 PROCEDURE — G8484 FLU IMMUNIZE NO ADMIN: HCPCS | Performed by: INTERNAL MEDICINE

## 2018-01-22 PROCEDURE — G8427 DOCREV CUR MEDS BY ELIG CLIN: HCPCS | Performed by: INTERNAL MEDICINE

## 2018-01-22 PROCEDURE — G8417 CALC BMI ABV UP PARAM F/U: HCPCS | Performed by: INTERNAL MEDICINE

## 2018-01-22 PROCEDURE — 4040F PNEUMOC VAC/ADMIN/RCVD: CPT | Performed by: INTERNAL MEDICINE

## 2018-01-22 PROCEDURE — 3017F COLORECTAL CA SCREEN DOC REV: CPT | Performed by: INTERNAL MEDICINE

## 2018-01-22 PROCEDURE — 1123F ACP DISCUSS/DSCN MKR DOCD: CPT | Performed by: INTERNAL MEDICINE

## 2018-01-22 PROCEDURE — 99213 OFFICE O/P EST LOW 20 MIN: CPT | Performed by: INTERNAL MEDICINE

## 2018-01-22 PROCEDURE — G8598 ASA/ANTIPLAT THER USED: HCPCS | Performed by: INTERNAL MEDICINE

## 2018-01-22 PROCEDURE — 1036F TOBACCO NON-USER: CPT | Performed by: INTERNAL MEDICINE

## 2018-01-22 RX ORDER — FUROSEMIDE 20 MG/1
20 TABLET ORAL EVERY OTHER DAY
Qty: 30 TABLET | Refills: 11 | Status: SHIPPED | OUTPATIENT
Start: 2018-01-22 | End: 2018-03-13 | Stop reason: SDUPTHER

## 2018-01-22 RX ORDER — FLUTICASONE PROPIONATE 50 MCG
SPRAY, SUSPENSION (ML) NASAL
Qty: 1 BOTTLE | Refills: 11 | Status: SHIPPED | OUTPATIENT
Start: 2018-01-22 | End: 2021-12-30 | Stop reason: ALTCHOICE

## 2018-01-22 ASSESSMENT — PATIENT HEALTH QUESTIONNAIRE - PHQ9
SUM OF ALL RESPONSES TO PHQ QUESTIONS 1-9: 0
1. LITTLE INTEREST OR PLEASURE IN DOING THINGS: 0
SUM OF ALL RESPONSES TO PHQ9 QUESTIONS 1 & 2: 0
2. FEELING DOWN, DEPRESSED OR HOPELESS: 0

## 2018-01-23 DIAGNOSIS — R19.7 DIARRHEA, UNSPECIFIED TYPE: ICD-10-CM

## 2018-01-23 LAB — WHITE BLOOD CELLS (WBC), STOOL: NORMAL

## 2018-01-24 ENCOUNTER — OFFICE VISIT (OUTPATIENT)
Dept: INTERNAL MEDICINE CLINIC | Age: 73
End: 2018-01-24

## 2018-01-24 ENCOUNTER — HOSPITAL ENCOUNTER (OUTPATIENT)
Dept: GENERAL RADIOLOGY | Age: 73
Discharge: HOME | End: 2018-01-24
Attending: INTERNAL MEDICINE | Admitting: INTERNAL MEDICINE

## 2018-01-24 VITALS
SYSTOLIC BLOOD PRESSURE: 130 MMHG | OXYGEN SATURATION: 98 % | RESPIRATION RATE: 16 BRPM | HEART RATE: 74 BPM | DIASTOLIC BLOOD PRESSURE: 70 MMHG

## 2018-01-24 DIAGNOSIS — R05.9 COUGH: ICD-10-CM

## 2018-01-24 LAB
GI BACTERIAL PATHOGENS BY PCR: NORMAL
RAPID INFLUENZA  B AGN: NEGATIVE
RAPID INFLUENZA A AGN: POSITIVE

## 2018-01-24 PROCEDURE — G8598 ASA/ANTIPLAT THER USED: HCPCS | Performed by: INTERNAL MEDICINE

## 2018-01-24 PROCEDURE — 99213 OFFICE O/P EST LOW 20 MIN: CPT | Performed by: INTERNAL MEDICINE

## 2018-01-24 PROCEDURE — 1036F TOBACCO NON-USER: CPT | Performed by: INTERNAL MEDICINE

## 2018-01-24 PROCEDURE — G8427 DOCREV CUR MEDS BY ELIG CLIN: HCPCS | Performed by: INTERNAL MEDICINE

## 2018-01-24 PROCEDURE — 1123F ACP DISCUSS/DSCN MKR DOCD: CPT | Performed by: INTERNAL MEDICINE

## 2018-01-24 PROCEDURE — 3017F COLORECTAL CA SCREEN DOC REV: CPT | Performed by: INTERNAL MEDICINE

## 2018-01-24 PROCEDURE — G8417 CALC BMI ABV UP PARAM F/U: HCPCS | Performed by: INTERNAL MEDICINE

## 2018-01-24 PROCEDURE — 4040F PNEUMOC VAC/ADMIN/RCVD: CPT | Performed by: INTERNAL MEDICINE

## 2018-01-24 PROCEDURE — G8484 FLU IMMUNIZE NO ADMIN: HCPCS | Performed by: INTERNAL MEDICINE

## 2018-01-24 RX ORDER — GUAIFENESIN AND CODEINE PHOSPHATE 100; 10 MG/5ML; MG/5ML
5 SOLUTION ORAL 4 TIMES DAILY PRN
Qty: 120 ML | Refills: 0 | Status: SHIPPED | OUTPATIENT
Start: 2018-01-24 | End: 2018-01-26 | Stop reason: SDUPTHER

## 2018-01-24 RX ORDER — AZITHROMYCIN 250 MG/1
TABLET, FILM COATED ORAL
Qty: 6 TABLET | Refills: 0 | Status: SHIPPED | OUTPATIENT
Start: 2018-01-24 | End: 2018-02-21 | Stop reason: ALTCHOICE

## 2018-01-24 RX ORDER — OSELTAMIVIR PHOSPHATE 75 MG/1
75 CAPSULE ORAL 2 TIMES DAILY
Qty: 10 CAPSULE | Refills: 0 | Status: SHIPPED | OUTPATIENT
Start: 2018-01-24 | End: 2018-01-29

## 2018-01-24 NOTE — PROGRESS NOTES
Tristen Workman  [birth year]  01/24/18    SUBJECTIVE:    Pt with myalgias, ear pain, arthralgias, dry cough, wheezing, headache, rhinorrhea, nasal congestion, chills. He denies SOB, fevers. Symptoms started yesterday    He has used coricidan, ASA< throat lozenges, fluids. OBJECTIVE:    /70   Pulse 74   Resp 16   SpO2 98%     Physical Exam   Constitutional: He appears well-developed. HENT:   Right Ear: External ear normal.   Left Ear: External ear normal.   Nose: Nose normal.   Mouth/Throat: No oropharyngeal exudate. Eyes: Conjunctivae are normal.   Cardiovascular: Normal rate, regular rhythm and normal heart sounds. Pulmonary/Chest: Effort normal. He has wheezes. Lymphadenopathy:     He has no cervical adenopathy. Neurological: He is alert. ASSESSMENT:    1. Cough        PLAN:    Chantelle Noel was seen today for cough and fever. Diagnoses and all orders for this visit:    Cough - will check STAT flu swab; If (+) start tamiflu; if (-) will start azith; use sample of Breo, cough med as well  -     guaiFENesin-codeine (TUSSI-ORGANIDIN NR) 100-10 MG/5ML syrup; Take 5 mLs by mouth 4 times daily as needed for Cough for up to 7 days.  -     RAPID INFLUENZA A/B ANTIGENS; Future  -     oseltamivir (TAMIFLU) 75 MG capsule;  Take 1 capsule by mouth 2 times daily for 5 days  -     azithromycin (ZITHROMAX Z-DEEPA) 250 MG tablet; Use as directed

## 2018-01-26 DIAGNOSIS — R05.9 COUGH: ICD-10-CM

## 2018-01-26 LAB
OVA AND PARASITE TRICHROME: NEGATIVE
OVA AND PARASITE WET MOUNT: NEGATIVE

## 2018-01-26 RX ORDER — GUAIFENESIN AND CODEINE PHOSPHATE 100; 10 MG/5ML; MG/5ML
5 SOLUTION ORAL 4 TIMES DAILY PRN
Qty: 120 ML | Refills: 0 | Status: SHIPPED | OUTPATIENT
Start: 2018-01-26 | End: 2018-02-02

## 2018-01-29 ENCOUNTER — TELEPHONE (OUTPATIENT)
Dept: INTERNAL MEDICINE CLINIC | Age: 73
End: 2018-01-29

## 2018-02-07 DIAGNOSIS — E11.9 TYPE 2 DIABETES MELLITUS WITHOUT COMPLICATION, WITH LONG-TERM CURRENT USE OF INSULIN (HCC): ICD-10-CM

## 2018-02-07 DIAGNOSIS — Z79.4 TYPE 2 DIABETES MELLITUS WITHOUT COMPLICATION, WITH LONG-TERM CURRENT USE OF INSULIN (HCC): ICD-10-CM

## 2018-02-09 ENCOUNTER — TELEPHONE (OUTPATIENT)
Dept: INTERNAL MEDICINE CLINIC | Age: 73
End: 2018-02-09

## 2018-02-21 ENCOUNTER — OFFICE VISIT (OUTPATIENT)
Dept: CARDIOLOGY CLINIC | Age: 73
End: 2018-02-21

## 2018-02-21 VITALS
HEART RATE: 68 BPM | SYSTOLIC BLOOD PRESSURE: 134 MMHG | WEIGHT: 196 LBS | RESPIRATION RATE: 16 BRPM | BODY MASS INDEX: 30.76 KG/M2 | DIASTOLIC BLOOD PRESSURE: 66 MMHG | HEIGHT: 67 IN

## 2018-02-21 DIAGNOSIS — I25.10 CORONARY ARTERY DISEASE INVOLVING NATIVE CORONARY ARTERY OF NATIVE HEART WITHOUT ANGINA PECTORIS: Primary | ICD-10-CM

## 2018-02-21 PROCEDURE — G8484 FLU IMMUNIZE NO ADMIN: HCPCS | Performed by: INTERNAL MEDICINE

## 2018-02-21 PROCEDURE — 1036F TOBACCO NON-USER: CPT | Performed by: INTERNAL MEDICINE

## 2018-02-21 PROCEDURE — 4040F PNEUMOC VAC/ADMIN/RCVD: CPT | Performed by: INTERNAL MEDICINE

## 2018-02-21 PROCEDURE — G8427 DOCREV CUR MEDS BY ELIG CLIN: HCPCS | Performed by: INTERNAL MEDICINE

## 2018-02-21 PROCEDURE — G8598 ASA/ANTIPLAT THER USED: HCPCS | Performed by: INTERNAL MEDICINE

## 2018-02-21 PROCEDURE — 3017F COLORECTAL CA SCREEN DOC REV: CPT | Performed by: INTERNAL MEDICINE

## 2018-02-21 PROCEDURE — 99214 OFFICE O/P EST MOD 30 MIN: CPT | Performed by: INTERNAL MEDICINE

## 2018-02-21 PROCEDURE — 1123F ACP DISCUSS/DSCN MKR DOCD: CPT | Performed by: INTERNAL MEDICINE

## 2018-02-21 PROCEDURE — G8417 CALC BMI ABV UP PARAM F/U: HCPCS | Performed by: INTERNAL MEDICINE

## 2018-02-21 RX ORDER — PHENTERMINE HYDROCHLORIDE 37.5 MG/1
37.5 TABLET ORAL
Qty: 30 TABLET | Refills: 0 | Status: SHIPPED | OUTPATIENT
Start: 2018-02-21 | End: 2018-03-23

## 2018-02-21 NOTE — PROGRESS NOTES
for: CKTOTAL, CKMB, CKMBINDEX, TROPONINI  BNP:  No results found for: BNP  PT/INR:  No results found for: PTINR  Lab Results   Component Value Date    LABA1C 7.2 (H) 11/16/2017    LABA1C 7.5 05/16/2017     Lab Results   Component Value Date    CHOL 135 11/16/2017    TRIG 107 11/16/2017    HDL 40 11/16/2017    LDLCALC 78 05/16/2017     Lab Results   Component Value Date    ALT 37 11/16/2017    AST 30 11/16/2017     TSH:  No results found for: TSH    Impression:  Kiran Giang is a 81 y. o.year old who  has a past medical history of CAD (coronary artery disease); Cerebral artery occlusion with cerebral infarction ([address] Chronic back pain; Diabetes mellitus ([address] Erectile dysfunction; Gastritis; Glaucoma; H/O cardiovascular stress test; H/O echocardiogram; H/O left heart catheterization by cutdown; Hx of echocardiogram; Hyperlipidemia; Hypertension; Polyp of gallbladder; PVD (peripheral vascular disease) ([address] Teeth missing; and Wears glasses. and presents with     Plan:  1. CAD and cardiomyopathy :  LHC showed mild LAD disease and ventriculogram showed normal LVEF.,Continue aspirin,continue statins, ACEinhibitors, betablockers  2. Weight gain: not sure if actos is causing him water retention, will recommend to talk to Dr. Maribel Lo if Actos can be switched to a different class, will add adipex for now, continue lasix as is. 3. DM: stable continue metformin  4. Dyslipidemia: continue statins  5. HTN: stable, continue lopressor and lisinopril medicatons  6. CKD: stable,   1. Health maintenance: exerise and diet  All labs, medications and tests reviewed, continue all other medications of all above medical condition listed as is.     [unfilled]

## 2018-02-21 NOTE — ADDENDUM NOTE
Encounter addended by: Stacey Guan MD on: 2/21/2018  1:13 PM<BR>    Actions taken: Sign clinical note

## 2018-03-13 DIAGNOSIS — E78.00 PURE HYPERCHOLESTEROLEMIA: ICD-10-CM

## 2018-03-13 DIAGNOSIS — I10 ESSENTIAL HYPERTENSION: ICD-10-CM

## 2018-03-13 DIAGNOSIS — K21.9 GASTROESOPHAGEAL REFLUX DISEASE WITHOUT ESOPHAGITIS: ICD-10-CM

## 2018-03-13 DIAGNOSIS — E11.9 TYPE 2 DIABETES MELLITUS WITHOUT COMPLICATION, WITHOUT LONG-TERM CURRENT USE OF INSULIN (HCC): ICD-10-CM

## 2018-03-13 DIAGNOSIS — R06.02 SOB (SHORTNESS OF BREATH): ICD-10-CM

## 2018-03-13 DIAGNOSIS — E11.42 TYPE 2 DIABETES MELLITUS WITH DIABETIC POLYNEUROPATHY, UNSPECIFIED LONG TERM INSULIN USE STATUS: ICD-10-CM

## 2018-03-13 RX ORDER — FUROSEMIDE 20 MG/1
20 TABLET ORAL EVERY OTHER DAY
Qty: 45 TABLET | Refills: 3 | Status: SHIPPED | OUTPATIENT
Start: 2018-03-13 | End: 2019-05-16

## 2018-03-13 RX ORDER — ATORVASTATIN CALCIUM 80 MG/1
80 TABLET, FILM COATED ORAL NIGHTLY
Qty: 90 TABLET | Refills: 3 | Status: SHIPPED | OUTPATIENT
Start: 2018-03-13 | End: 2019-01-24 | Stop reason: SDUPTHER

## 2018-03-13 RX ORDER — LISINOPRIL 20 MG/1
20 TABLET ORAL EVERY MORNING
Qty: 90 TABLET | Refills: 3 | Status: SHIPPED | OUTPATIENT
Start: 2018-03-13 | End: 2019-01-24 | Stop reason: SDUPTHER

## 2018-03-13 RX ORDER — OMEPRAZOLE 20 MG/1
20 CAPSULE, DELAYED RELEASE ORAL EVERY MORNING
Qty: 90 CAPSULE | Refills: 3 | Status: SHIPPED | OUTPATIENT
Start: 2018-03-13 | End: 2018-11-14

## 2018-03-13 RX ORDER — GLIPIZIDE 10 MG/1
10 TABLET, FILM COATED, EXTENDED RELEASE ORAL DAILY
Qty: 180 TABLET | Refills: 3 | Status: SHIPPED | OUTPATIENT
Start: 2018-03-13 | End: 2019-01-24 | Stop reason: SDUPTHER

## 2018-03-21 ENCOUNTER — OFFICE VISIT (OUTPATIENT)
Dept: CARDIOLOGY CLINIC | Age: 73
End: 2018-03-21

## 2018-03-21 VITALS
SYSTOLIC BLOOD PRESSURE: 114 MMHG | WEIGHT: 192 LBS | HEART RATE: 60 BPM | HEIGHT: 67 IN | BODY MASS INDEX: 30.13 KG/M2 | DIASTOLIC BLOOD PRESSURE: 68 MMHG | RESPIRATION RATE: 16 BRPM

## 2018-03-21 DIAGNOSIS — I25.10 CORONARY ARTERY DISEASE INVOLVING NATIVE CORONARY ARTERY OF NATIVE HEART WITHOUT ANGINA PECTORIS: Primary | ICD-10-CM

## 2018-03-21 PROCEDURE — G8427 DOCREV CUR MEDS BY ELIG CLIN: HCPCS | Performed by: INTERNAL MEDICINE

## 2018-03-21 PROCEDURE — 1123F ACP DISCUSS/DSCN MKR DOCD: CPT | Performed by: INTERNAL MEDICINE

## 2018-03-21 PROCEDURE — G8482 FLU IMMUNIZE ORDER/ADMIN: HCPCS | Performed by: INTERNAL MEDICINE

## 2018-03-21 PROCEDURE — 3017F COLORECTAL CA SCREEN DOC REV: CPT | Performed by: INTERNAL MEDICINE

## 2018-03-21 PROCEDURE — G8598 ASA/ANTIPLAT THER USED: HCPCS | Performed by: INTERNAL MEDICINE

## 2018-03-21 PROCEDURE — 99214 OFFICE O/P EST MOD 30 MIN: CPT | Performed by: INTERNAL MEDICINE

## 2018-03-21 PROCEDURE — 1036F TOBACCO NON-USER: CPT | Performed by: INTERNAL MEDICINE

## 2018-03-21 PROCEDURE — 4040F PNEUMOC VAC/ADMIN/RCVD: CPT | Performed by: INTERNAL MEDICINE

## 2018-03-21 PROCEDURE — G8417 CALC BMI ABV UP PARAM F/U: HCPCS | Performed by: INTERNAL MEDICINE

## 2018-03-21 RX ORDER — PHENTERMINE HYDROCHLORIDE 37.5 MG/1
37.5 TABLET ORAL
Qty: 30 TABLET | Refills: 0 | Status: SHIPPED | OUTPATIENT
Start: 2018-03-21 | End: 2018-04-20

## 2018-03-21 NOTE — PROGRESS NOTES
or hives  All systems negative except as marked. Objective:  /68 (Site: Left Arm, Position: Sitting, Cuff Size: Large Adult)   Pulse 60   Resp 16   Ht 5' 6.75\" (1.695 m)   Wt 192 lb (87.1 kg)   BMI 30.30 kg/m²   Wt Readings from Last 3 Encounters:   03/21/18 192 lb (87.1 kg)   02/21/18 196 lb (88.9 kg)   01/22/18 197 lb (89.4 kg)     Body mass index is 30.3 kg/m². GENERAL - Alert, oriented, pleasant, in no apparent distress,normal grooming  HEENT  pupils are reactive to light and accomodation, cornea intact, conjunctive normal color, ears are normal in exam,throat exam in normal, teeth, gum and palate are normal, oral mucosa is normal without any notation of pallor or cyanosis  Neck - Supple. No jugular venous distention noted. No carotid bruits, no apical -carotid delay  Respiratory:  Normal breath sounds, No respiratory distress, No wheezing, No chest tenderness. ,no use of accessory muscles, diaphragm movement is normal  Cardiovascular: (PMI) apex non displaced,no lifts no thrills, no s3,no s4, Normal heart rate, Normal rhythm, No murmurs, No rubs, No gallops. Carotid arteries pulse and amplitude are normal no bruit, no abdominal bruit noted ( normal abdominal aorta ausculation), femoral arteries pulse and amplitude are normal no bruit, pedal pulses are normal  Femoral pulses have normal amplitude, no bruits   Extremities - No cyanosis, clubbing, or significant edema, no varicose veins    Abdomen  No masses, tenderness, or organomegaly, no hepato-splenomegally, no bruits  Musculoskeletal  No significant edema, no kyphosis or scoliosis, no deformity in any extremity noted, muscle strength and tone are normal  Skin: no ulcer,no scar,no stasis dermatitis   Neurologic  alert oriented times 3,Cranial nerves II through XII are grossly intact. There were no gross focal neurologic abnormalities.  All sensory and motor nerves examined and were normal  Psychiatric: normal mood and affect    No results

## 2018-04-18 ENCOUNTER — NURSE ONLY (OUTPATIENT)
Dept: CARDIOLOGY CLINIC | Age: 73
End: 2018-04-18

## 2018-04-18 ENCOUNTER — TELEPHONE (OUTPATIENT)
Dept: CARDIOLOGY CLINIC | Age: 73
End: 2018-04-18

## 2018-04-18 VITALS
WEIGHT: 190.8 LBS | HEART RATE: 68 BPM | BODY MASS INDEX: 30.11 KG/M2 | SYSTOLIC BLOOD PRESSURE: 136 MMHG | DIASTOLIC BLOOD PRESSURE: 64 MMHG

## 2018-04-18 DIAGNOSIS — I25.10 CORONARY ARTERY DISEASE INVOLVING NATIVE CORONARY ARTERY OF NATIVE HEART WITHOUT ANGINA PECTORIS: Primary | ICD-10-CM

## 2018-04-18 PROCEDURE — 99999 PR OFFICE/OUTPT VISIT,PROCEDURE ONLY: CPT | Performed by: INTERNAL MEDICINE

## 2018-05-16 ENCOUNTER — OFFICE VISIT (OUTPATIENT)
Dept: INTERNAL MEDICINE CLINIC | Age: 73
End: 2018-05-16

## 2018-05-16 ENCOUNTER — OFFICE VISIT (OUTPATIENT)
Dept: CARDIOLOGY CLINIC | Age: 73
End: 2018-05-16

## 2018-05-16 VITALS
OXYGEN SATURATION: 98 % | DIASTOLIC BLOOD PRESSURE: 72 MMHG | HEART RATE: 66 BPM | RESPIRATION RATE: 18 BRPM | BODY MASS INDEX: 30.04 KG/M2 | SYSTOLIC BLOOD PRESSURE: 130 MMHG | WEIGHT: 190.4 LBS

## 2018-05-16 VITALS
SYSTOLIC BLOOD PRESSURE: 120 MMHG | HEART RATE: 72 BPM | DIASTOLIC BLOOD PRESSURE: 60 MMHG | HEIGHT: 67 IN | BODY MASS INDEX: 29.98 KG/M2 | WEIGHT: 191 LBS

## 2018-05-16 DIAGNOSIS — I50.22 CHRONIC SYSTOLIC CONGESTIVE HEART FAILURE (HCC): ICD-10-CM

## 2018-05-16 DIAGNOSIS — E78.2 MIXED HYPERLIPIDEMIA: ICD-10-CM

## 2018-05-16 DIAGNOSIS — I73.9 PAD (PERIPHERAL ARTERY DISEASE) (HCC): ICD-10-CM

## 2018-05-16 DIAGNOSIS — N18.2 CHRONIC RENAL IMPAIRMENT, STAGE 2 (MILD): ICD-10-CM

## 2018-05-16 DIAGNOSIS — I10 ESSENTIAL HYPERTENSION: ICD-10-CM

## 2018-05-16 DIAGNOSIS — I25.10 CORONARY ARTERY DISEASE INVOLVING NATIVE CORONARY ARTERY OF NATIVE HEART WITHOUT ANGINA PECTORIS: Primary | ICD-10-CM

## 2018-05-16 DIAGNOSIS — I10 ESSENTIAL HYPERTENSION: Primary | ICD-10-CM

## 2018-05-16 DIAGNOSIS — E11.9 TYPE 2 DIABETES MELLITUS WITHOUT COMPLICATION, WITHOUT LONG-TERM CURRENT USE OF INSULIN (HCC): ICD-10-CM

## 2018-05-16 DIAGNOSIS — K21.9 GASTROESOPHAGEAL REFLUX DISEASE WITHOUT ESOPHAGITIS: ICD-10-CM

## 2018-05-16 LAB
A/G RATIO: 1.5 (ref 1.1–2.2)
ALBUMIN SERPL-MCNC: 4.2 G/DL (ref 3.4–5)
ALP BLD-CCNC: 105 U/L (ref 40–129)
ALT SERPL-CCNC: 25 U/L (ref 10–40)
ANION GAP SERPL CALCULATED.3IONS-SCNC: 19 MMOL/L (ref 3–16)
AST SERPL-CCNC: 25 U/L (ref 15–37)
BASOPHILS ABSOLUTE: 0 K/UL (ref 0–0.2)
BASOPHILS RELATIVE PERCENT: 0.5 %
BILIRUB SERPL-MCNC: 0.5 MG/DL (ref 0–1)
BUN BLDV-MCNC: 28 MG/DL (ref 7–20)
CALCIUM SERPL-MCNC: 8.9 MG/DL (ref 8.3–10.6)
CHLORIDE BLD-SCNC: 103 MMOL/L (ref 99–110)
CHOLESTEROL, TOTAL: 121 MG/DL (ref 0–199)
CO2: 20 MMOL/L (ref 21–32)
CREAT SERPL-MCNC: 1.6 MG/DL (ref 0.8–1.3)
EOSINOPHILS ABSOLUTE: 0.1 K/UL (ref 0–0.6)
EOSINOPHILS RELATIVE PERCENT: 1.2 %
GFR AFRICAN AMERICAN: 52
GFR NON-AFRICAN AMERICAN: 43
GLOBULIN: 2.8 G/DL
GLUCOSE BLD-MCNC: 77 MG/DL (ref 70–99)
HCT VFR BLD CALC: 38 % (ref 40.5–52.5)
HDLC SERPL-MCNC: 33 MG/DL (ref 40–60)
HEMOGLOBIN: 13 G/DL (ref 13.5–17.5)
LDL CHOLESTEROL CALCULATED: 66 MG/DL
LYMPHOCYTES ABSOLUTE: 1.7 K/UL (ref 1–5.1)
LYMPHOCYTES RELATIVE PERCENT: 22.7 %
MCH RBC QN AUTO: 31.8 PG (ref 26–34)
MCHC RBC AUTO-ENTMCNC: 34.3 G/DL (ref 31–36)
MCV RBC AUTO: 92.7 FL (ref 80–100)
MONOCYTES ABSOLUTE: 0.6 K/UL (ref 0–1.3)
MONOCYTES RELATIVE PERCENT: 8.4 %
NEUTROPHILS ABSOLUTE: 5.1 K/UL (ref 1.7–7.7)
NEUTROPHILS RELATIVE PERCENT: 67.2 %
PDW BLD-RTO: 13.8 % (ref 12.4–15.4)
PLATELET # BLD: 191 K/UL (ref 135–450)
PMV BLD AUTO: 9.5 FL (ref 5–10.5)
POTASSIUM SERPL-SCNC: 4.8 MMOL/L (ref 3.5–5.1)
RBC # BLD: 4.1 M/UL (ref 4.2–5.9)
SODIUM BLD-SCNC: 142 MMOL/L (ref 136–145)
TOTAL PROTEIN: 7 G/DL (ref 6.4–8.2)
TRIGL SERPL-MCNC: 112 MG/DL (ref 0–150)
VLDLC SERPL CALC-MCNC: 22 MG/DL
WBC # BLD: 7.6 K/UL (ref 4–11)

## 2018-05-16 PROCEDURE — 3017F COLORECTAL CA SCREEN DOC REV: CPT | Performed by: INTERNAL MEDICINE

## 2018-05-16 PROCEDURE — 1123F ACP DISCUSS/DSCN MKR DOCD: CPT | Performed by: INTERNAL MEDICINE

## 2018-05-16 PROCEDURE — 2022F DILAT RTA XM EVC RTNOPTHY: CPT | Performed by: INTERNAL MEDICINE

## 2018-05-16 PROCEDURE — G8598 ASA/ANTIPLAT THER USED: HCPCS | Performed by: INTERNAL MEDICINE

## 2018-05-16 PROCEDURE — G8417 CALC BMI ABV UP PARAM F/U: HCPCS | Performed by: INTERNAL MEDICINE

## 2018-05-16 PROCEDURE — 3046F HEMOGLOBIN A1C LEVEL >9.0%: CPT | Performed by: INTERNAL MEDICINE

## 2018-05-16 PROCEDURE — G8427 DOCREV CUR MEDS BY ELIG CLIN: HCPCS | Performed by: INTERNAL MEDICINE

## 2018-05-16 PROCEDURE — 1036F TOBACCO NON-USER: CPT | Performed by: INTERNAL MEDICINE

## 2018-05-16 PROCEDURE — 99214 OFFICE O/P EST MOD 30 MIN: CPT | Performed by: INTERNAL MEDICINE

## 2018-05-16 PROCEDURE — 4040F PNEUMOC VAC/ADMIN/RCVD: CPT | Performed by: INTERNAL MEDICINE

## 2018-05-16 RX ORDER — TIMOLOL MALEATE 5 MG/ML
SOLUTION/ DROPS OPHTHALMIC
Refills: 3 | COMMUNITY
Start: 2018-03-21 | End: 2018-05-16

## 2018-05-17 LAB
ESTIMATED AVERAGE GLUCOSE: 171.4 MG/DL
HBA1C MFR BLD: 7.6 %

## 2018-05-22 DIAGNOSIS — E11.9 TYPE 2 DIABETES MELLITUS WITHOUT COMPLICATION, WITH LONG-TERM CURRENT USE OF INSULIN (HCC): Primary | ICD-10-CM

## 2018-05-22 DIAGNOSIS — Z79.4 TYPE 2 DIABETES MELLITUS WITHOUT COMPLICATION, WITH LONG-TERM CURRENT USE OF INSULIN (HCC): Primary | ICD-10-CM

## 2018-05-25 DIAGNOSIS — E11.9 TYPE 2 DIABETES MELLITUS WITHOUT COMPLICATION, WITH LONG-TERM CURRENT USE OF INSULIN (HCC): ICD-10-CM

## 2018-05-25 DIAGNOSIS — E11.9 TYPE 2 DIABETES MELLITUS WITHOUT COMPLICATION, WITH LONG-TERM CURRENT USE OF INSULIN (HCC): Primary | ICD-10-CM

## 2018-05-25 DIAGNOSIS — Z79.4 TYPE 2 DIABETES MELLITUS WITHOUT COMPLICATION, WITH LONG-TERM CURRENT USE OF INSULIN (HCC): Primary | ICD-10-CM

## 2018-05-25 DIAGNOSIS — Z79.4 TYPE 2 DIABETES MELLITUS WITHOUT COMPLICATION, WITH LONG-TERM CURRENT USE OF INSULIN (HCC): ICD-10-CM

## 2018-05-31 ENCOUNTER — HOSPITAL ENCOUNTER (OUTPATIENT)
Dept: MRI IMAGING | Age: 73
Discharge: HOME | End: 2018-05-31
Attending: ORTHOPAEDIC SURGERY | Admitting: ORTHOPAEDIC SURGERY

## 2018-05-31 DIAGNOSIS — M25.462 SWELLING OF LEFT KNEE JOINT: ICD-10-CM

## 2018-05-31 DIAGNOSIS — M25.562 LEFT KNEE PAIN, UNSPECIFIED CHRONICITY: ICD-10-CM

## 2018-05-31 DIAGNOSIS — M25.462 EFFUSION OF LEFT KNEE: ICD-10-CM

## 2018-07-31 ENCOUNTER — TELEPHONE (OUTPATIENT)
Dept: CARDIOLOGY CLINIC | Age: 73
End: 2018-07-31

## 2018-08-28 DIAGNOSIS — Z79.4 TYPE 2 DIABETES MELLITUS WITHOUT COMPLICATION, WITH LONG-TERM CURRENT USE OF INSULIN (HCC): ICD-10-CM

## 2018-08-28 DIAGNOSIS — E11.9 TYPE 2 DIABETES MELLITUS WITHOUT COMPLICATION, WITH LONG-TERM CURRENT USE OF INSULIN (HCC): ICD-10-CM

## 2018-10-15 DIAGNOSIS — Z23 FLU VACCINE NEED: Primary | ICD-10-CM

## 2018-10-15 PROCEDURE — 90471 IMMUNIZATION ADMIN: CPT | Performed by: INTERNAL MEDICINE

## 2018-10-15 PROCEDURE — 90662 IIV NO PRSV INCREASED AG IM: CPT | Performed by: INTERNAL MEDICINE

## 2018-11-14 ENCOUNTER — OFFICE VISIT (OUTPATIENT)
Dept: CARDIOLOGY CLINIC | Age: 73
End: 2018-11-14
Payer: COMMERCIAL

## 2018-11-14 VITALS
SYSTOLIC BLOOD PRESSURE: 132 MMHG | HEIGHT: 67 IN | WEIGHT: 193 LBS | DIASTOLIC BLOOD PRESSURE: 72 MMHG | RESPIRATION RATE: 18 BRPM | HEART RATE: 64 BPM | BODY MASS INDEX: 30.29 KG/M2

## 2018-11-14 DIAGNOSIS — E66.9 OBESITY (BMI 35.0-39.9 WITHOUT COMORBIDITY): Primary | ICD-10-CM

## 2018-11-14 PROCEDURE — G8427 DOCREV CUR MEDS BY ELIG CLIN: HCPCS | Performed by: INTERNAL MEDICINE

## 2018-11-14 PROCEDURE — 1123F ACP DISCUSS/DSCN MKR DOCD: CPT | Performed by: INTERNAL MEDICINE

## 2018-11-14 PROCEDURE — 4040F PNEUMOC VAC/ADMIN/RCVD: CPT | Performed by: INTERNAL MEDICINE

## 2018-11-14 PROCEDURE — 1036F TOBACCO NON-USER: CPT | Performed by: INTERNAL MEDICINE

## 2018-11-14 PROCEDURE — 3017F COLORECTAL CA SCREEN DOC REV: CPT | Performed by: INTERNAL MEDICINE

## 2018-11-14 PROCEDURE — G8417 CALC BMI ABV UP PARAM F/U: HCPCS | Performed by: INTERNAL MEDICINE

## 2018-11-14 PROCEDURE — 1101F PT FALLS ASSESS-DOCD LE1/YR: CPT | Performed by: INTERNAL MEDICINE

## 2018-11-14 PROCEDURE — G8598 ASA/ANTIPLAT THER USED: HCPCS | Performed by: INTERNAL MEDICINE

## 2018-11-14 PROCEDURE — 99214 OFFICE O/P EST MOD 30 MIN: CPT | Performed by: INTERNAL MEDICINE

## 2018-11-14 PROCEDURE — G8482 FLU IMMUNIZE ORDER/ADMIN: HCPCS | Performed by: INTERNAL MEDICINE

## 2018-11-14 RX ORDER — PHENTERMINE HYDROCHLORIDE 37.5 MG/1
37.5 TABLET ORAL
Qty: 30 TABLET | Refills: 0 | Status: SHIPPED | OUTPATIENT
Start: 2018-11-14 | End: 2018-12-14

## 2018-11-14 NOTE — PROGRESS NOTES
Rigoberto Colunga MD        OFFICE  FOLLOWUP NOTE    Chief complaints: patient is here for management of CAD, DM, HTN, dyspnea, DYSLPIDEMIA, CKD and obesity    Subjective: patient feels better, no chest pain, no shortness of breath, no dizziness, no palpitations    HPI Tyra Bolanos is a 72 y. o.year old who  has a past medical history of CAD (coronary artery disease); Cerebral artery occlusion with cerebral infarction ([facility] Chronic back pain; Diabetes mellitus ([facility] Erectile dysfunction; Gastritis; Glaucoma; H/O cardiovascular stress test; H/O echocardiogram; H/O left heart catheterization by cutdown; Hx of echocardiogram; Hyperlipidemia; Hypertension; Polyp of gallbladder; PVD (peripheral vascular disease) ([facility] [facility] Teeth missing; and Wears glasses.  and presents for management of CAD, DM, HTN, dyspnea, DYSLPIDEMIA, CKD and obesity, which are well controlled      Current Outpatient Prescriptions   Medication Sig Dispense Refill    insulin glargine (TOUJEO SOLOSTAR) 300 UNIT/ML injection pen Inject 35 Units into the skin nightly 3 pen 3    ONE TOUCH ULTRA TEST strip USE ONE STRIP TO CHECK GLUCOSE THREE TIMES DAILY 100 strip 3    glucose blood VI test strips (ONE TOUCH ULTRA TEST) strip 1 each by In Vitro route 3 times daily 300 strip 3    ONETOUCH DELICA LANCETS FINE MISC Test three times daily or prn 100 each 6    Blood Glucose Monitoring Suppl DRISS Use three times daily 1 Device 0    UNABLE TO FIND Please administer two SHINGRIX vaccines 2-6 months apart 2 Units 0    furosemide (LASIX) 20 MG tablet Take 1 tablet by mouth every other day 45 tablet 3    atorvastatin (LIPITOR) 80 MG tablet Take 1 tablet by mouth nightly 90 tablet 3    lisinopril (PRINIVIL;ZESTRIL) 20 MG tablet Take 1 tablet by mouth every morning 90 tablet 3    metoprolol tartrate (LOPRESSOR) 25 MG tablet Take 1 tablet by mouth 2 times daily 180 tablet 3    glipiZIDE (GLUCOTROL XL) 10 MG extended release tablet Take 1 tablet by mouth daily 180 tablet 3    SITagliptin (JANUVIA) 100 MG tablet Take 1 tablet by mouth daily 90 tablet 3    Insulin Pen Needle 32G X 4 MM MISC 1 each by Does not apply route daily 100 each 3    fluticasone (FLONASE) 50 MCG/ACT nasal spray USE ONE SPRAY(S) IN EACH NOSTRIL ONCE DAILY 1 Bottle 11    Timolol Maleate (TIMOPTIC OP) Apply to eye every morning One Drop Right Eye Every Morning      latanoprost (XALATAN) 0.005 % ophthalmic solution Place 1 drop into both eyes nightly 1 Bottle 6    aspirin 325 MG EC tablet Take 325 mg by mouth nightly Over The Counter       No current facility-administered medications for this visit. Allergies: Penicillins and Sulfa antibiotics  Past Medical History:   Diagnosis Date    CAD (coronary artery disease)     Sees Dr. Liane Friedman Cerebral artery occlusion with cerebral infarction [facility]) Dx 1990's    \"TIA's\" No Residual     Chronic back pain     Diabetes mellitus ([facility] Dx 2002    Erectile dysfunction     Gastritis     4/13 EGD - mild gastritis    Glaucoma     Bilateral Eyes    H/O cardiovascular stress test 5-25-12, 2011, 2009 2012, Ragini, EF 54 %, Normal pattern of perfusion in all regions.  H/O echocardiogram 2009,2005 2009, EF 45-50%, Trace MR & TR    H/O left heart catheterization by cutdown 08/2015    Hx of echocardiogram 01/04/2017    No significant disease. EF 35-40%    Hyperlipidemia     5/12 Stress WNL, EF 54%; 8/11 - Stress Test WNL, EF 58%    Hypertension     Polyp of gallbladder     PVD (peripheral vascular disease) ([facility] 08/10/2016    [facility] sent copy of Bilat Lower Arterial US, scanned into Epic. Mild PVD .     Teeth missing     Upper And Lower    Wears glasses     To Read     Past Surgical History:   Procedure Laterality Date    CARDIAC CATHETERIZATION  Last Done 01/18/2017    CARPAL TUNNEL RELEASE Bilateral 1979    Done At Separate Times    CHOLECYSTECTOMY, LAPAROSCOPIC N/A 08/08/2017    COLONOSCOPY  10/2007    adenoma xl

## 2018-11-16 ENCOUNTER — OFFICE VISIT (OUTPATIENT)
Dept: INTERNAL MEDICINE CLINIC | Age: 73
End: 2018-11-16
Payer: COMMERCIAL

## 2018-11-16 VITALS
SYSTOLIC BLOOD PRESSURE: 136 MMHG | HEART RATE: 64 BPM | WEIGHT: 191.8 LBS | BODY MASS INDEX: 30.49 KG/M2 | DIASTOLIC BLOOD PRESSURE: 68 MMHG | RESPIRATION RATE: 16 BRPM | OXYGEN SATURATION: 98 %

## 2018-11-16 DIAGNOSIS — E78.2 MIXED HYPERLIPIDEMIA: ICD-10-CM

## 2018-11-16 DIAGNOSIS — Z79.4 TYPE 2 DIABETES MELLITUS WITHOUT COMPLICATION, WITH LONG-TERM CURRENT USE OF INSULIN (HCC): ICD-10-CM

## 2018-11-16 DIAGNOSIS — G25.2 INTENTION TREMOR: Primary | ICD-10-CM

## 2018-11-16 DIAGNOSIS — I10 ESSENTIAL HYPERTENSION: ICD-10-CM

## 2018-11-16 DIAGNOSIS — N18.30 CHRONIC RENAL INSUFFICIENCY, STAGE 3 (MODERATE) (HCC): ICD-10-CM

## 2018-11-16 DIAGNOSIS — E11.9 TYPE 2 DIABETES MELLITUS WITHOUT COMPLICATION, WITH LONG-TERM CURRENT USE OF INSULIN (HCC): ICD-10-CM

## 2018-11-16 LAB
A/G RATIO: 1.4 (ref 1.1–2.2)
ALBUMIN SERPL-MCNC: 4.4 G/DL (ref 3.4–5)
ALP BLD-CCNC: 115 U/L (ref 40–129)
ALT SERPL-CCNC: 35 U/L (ref 10–40)
ANION GAP SERPL CALCULATED.3IONS-SCNC: 15 MMOL/L (ref 3–16)
AST SERPL-CCNC: 24 U/L (ref 15–37)
BASOPHILS ABSOLUTE: 0 K/UL (ref 0–0.2)
BASOPHILS RELATIVE PERCENT: 0.6 %
BILIRUB SERPL-MCNC: 0.4 MG/DL (ref 0–1)
BUN BLDV-MCNC: 35 MG/DL (ref 7–20)
CALCIUM SERPL-MCNC: 9.4 MG/DL (ref 8.3–10.6)
CHLORIDE BLD-SCNC: 102 MMOL/L (ref 99–110)
CHOLESTEROL, TOTAL: 129 MG/DL (ref 0–199)
CO2: 23 MMOL/L (ref 21–32)
CREAT SERPL-MCNC: 1.8 MG/DL (ref 0.8–1.3)
CREATININE URINE: 106.9 MG/DL (ref 39–259)
EOSINOPHILS ABSOLUTE: 0.1 K/UL (ref 0–0.6)
EOSINOPHILS RELATIVE PERCENT: 1.4 %
GFR AFRICAN AMERICAN: 45
GFR NON-AFRICAN AMERICAN: 37
GLOBULIN: 3.1 G/DL
GLUCOSE BLD-MCNC: 245 MG/DL (ref 70–99)
HCT VFR BLD CALC: 42.2 % (ref 40.5–52.5)
HDLC SERPL-MCNC: 31 MG/DL (ref 40–60)
HEMOGLOBIN: 13.9 G/DL (ref 13.5–17.5)
LDL CHOLESTEROL CALCULATED: 61 MG/DL
LYMPHOCYTES ABSOLUTE: 1.8 K/UL (ref 1–5.1)
LYMPHOCYTES RELATIVE PERCENT: 26.4 %
MCH RBC QN AUTO: 31.5 PG (ref 26–34)
MCHC RBC AUTO-ENTMCNC: 32.9 G/DL (ref 31–36)
MCV RBC AUTO: 95.5 FL (ref 80–100)
MICROALBUMIN UR-MCNC: <1.2 MG/DL
MICROALBUMIN/CREAT UR-RTO: NORMAL MG/G (ref 0–30)
MONOCYTES ABSOLUTE: 0.6 K/UL (ref 0–1.3)
MONOCYTES RELATIVE PERCENT: 8.9 %
NEUTROPHILS ABSOLUTE: 4.4 K/UL (ref 1.7–7.7)
NEUTROPHILS RELATIVE PERCENT: 62.7 %
PDW BLD-RTO: 14.1 % (ref 12.4–15.4)
PLATELET # BLD: 175 K/UL (ref 135–450)
PMV BLD AUTO: 9.8 FL (ref 5–10.5)
POTASSIUM SERPL-SCNC: 5 MMOL/L (ref 3.5–5.1)
RBC # BLD: 4.42 M/UL (ref 4.2–5.9)
SODIUM BLD-SCNC: 140 MMOL/L (ref 136–145)
TOTAL PROTEIN: 7.5 G/DL (ref 6.4–8.2)
TRIGL SERPL-MCNC: 183 MG/DL (ref 0–150)
VLDLC SERPL CALC-MCNC: 37 MG/DL
WBC # BLD: 7 K/UL (ref 4–11)

## 2018-11-16 PROCEDURE — 1101F PT FALLS ASSESS-DOCD LE1/YR: CPT | Performed by: INTERNAL MEDICINE

## 2018-11-16 PROCEDURE — 1123F ACP DISCUSS/DSCN MKR DOCD: CPT | Performed by: INTERNAL MEDICINE

## 2018-11-16 PROCEDURE — 99214 OFFICE O/P EST MOD 30 MIN: CPT | Performed by: INTERNAL MEDICINE

## 2018-11-16 PROCEDURE — 3017F COLORECTAL CA SCREEN DOC REV: CPT | Performed by: INTERNAL MEDICINE

## 2018-11-16 PROCEDURE — 4040F PNEUMOC VAC/ADMIN/RCVD: CPT | Performed by: INTERNAL MEDICINE

## 2018-11-16 PROCEDURE — G8482 FLU IMMUNIZE ORDER/ADMIN: HCPCS | Performed by: INTERNAL MEDICINE

## 2018-11-16 PROCEDURE — G8427 DOCREV CUR MEDS BY ELIG CLIN: HCPCS | Performed by: INTERNAL MEDICINE

## 2018-11-16 PROCEDURE — G8417 CALC BMI ABV UP PARAM F/U: HCPCS | Performed by: INTERNAL MEDICINE

## 2018-11-16 PROCEDURE — 3045F PR MOST RECENT HEMOGLOBIN A1C LEVEL 7.0-9.0%: CPT | Performed by: INTERNAL MEDICINE

## 2018-11-16 PROCEDURE — 2022F DILAT RTA XM EVC RTNOPTHY: CPT | Performed by: INTERNAL MEDICINE

## 2018-11-16 PROCEDURE — G8598 ASA/ANTIPLAT THER USED: HCPCS | Performed by: INTERNAL MEDICINE

## 2018-11-16 PROCEDURE — 1036F TOBACCO NON-USER: CPT | Performed by: INTERNAL MEDICINE

## 2018-11-17 LAB
ESTIMATED AVERAGE GLUCOSE: 197.3 MG/DL
HBA1C MFR BLD: 8.5 %

## 2018-11-20 ENCOUNTER — TELEPHONE (OUTPATIENT)
Dept: INTERNAL MEDICINE CLINIC | Age: 73
End: 2018-11-20

## 2018-11-27 ENCOUNTER — TELEPHONE (OUTPATIENT)
Dept: INTERNAL MEDICINE CLINIC | Age: 73
End: 2018-11-27

## 2019-01-09 ENCOUNTER — OFFICE VISIT (OUTPATIENT)
Dept: CARDIOLOGY CLINIC | Age: 74
End: 2019-01-09
Payer: COMMERCIAL

## 2019-01-09 VITALS
WEIGHT: 183 LBS | SYSTOLIC BLOOD PRESSURE: 124 MMHG | BODY MASS INDEX: 28.72 KG/M2 | DIASTOLIC BLOOD PRESSURE: 62 MMHG | HEIGHT: 67 IN | HEART RATE: 56 BPM | RESPIRATION RATE: 16 BRPM

## 2019-01-09 DIAGNOSIS — E66.01 CLASS 3 SEVERE OBESITY DUE TO EXCESS CALORIES WITHOUT SERIOUS COMORBIDITY IN ADULT, UNSPECIFIED BMI (HCC): Primary | ICD-10-CM

## 2019-01-09 PROCEDURE — 99214 OFFICE O/P EST MOD 30 MIN: CPT | Performed by: INTERNAL MEDICINE

## 2019-01-09 RX ORDER — PHENTERMINE HYDROCHLORIDE 37.5 MG/1
37.5 TABLET ORAL
Qty: 30 TABLET | Refills: 0 | Status: SHIPPED | OUTPATIENT
Start: 2019-01-09 | End: 2019-02-08

## 2019-01-09 RX ORDER — PHENTERMINE HYDROCHLORIDE 37.5 MG/1
37.5 TABLET ORAL
COMMUNITY
End: 2019-01-09 | Stop reason: SDUPTHER

## 2019-01-15 ENCOUNTER — TELEPHONE (OUTPATIENT)
Dept: INTERNAL MEDICINE CLINIC | Age: 74
End: 2019-01-15

## 2019-01-24 ENCOUNTER — TELEPHONE (OUTPATIENT)
Dept: INTERNAL MEDICINE CLINIC | Age: 74
End: 2019-01-24

## 2019-01-24 DIAGNOSIS — E78.00 PURE HYPERCHOLESTEROLEMIA: ICD-10-CM

## 2019-01-24 DIAGNOSIS — E11.9 TYPE 2 DIABETES MELLITUS WITHOUT COMPLICATION, WITHOUT LONG-TERM CURRENT USE OF INSULIN (HCC): ICD-10-CM

## 2019-01-24 DIAGNOSIS — I10 ESSENTIAL HYPERTENSION: ICD-10-CM

## 2019-01-24 RX ORDER — ATORVASTATIN CALCIUM 80 MG/1
80 TABLET, FILM COATED ORAL NIGHTLY
Qty: 90 TABLET | Refills: 3 | Status: SHIPPED | OUTPATIENT
Start: 2019-01-24 | End: 2019-05-01 | Stop reason: SDUPTHER

## 2019-01-24 RX ORDER — LISINOPRIL 20 MG/1
20 TABLET ORAL EVERY MORNING
Qty: 90 TABLET | Refills: 3 | Status: SHIPPED | OUTPATIENT
Start: 2019-01-24 | End: 2019-11-18 | Stop reason: SINTOL

## 2019-01-24 RX ORDER — GLIPIZIDE 10 MG/1
10 TABLET, FILM COATED, EXTENDED RELEASE ORAL DAILY
Qty: 90 TABLET | Refills: 3 | Status: SHIPPED | OUTPATIENT
Start: 2019-01-24 | End: 2019-05-01

## 2019-02-06 ENCOUNTER — OFFICE VISIT (OUTPATIENT)
Dept: CARDIOLOGY CLINIC | Age: 74
End: 2019-02-06
Payer: COMMERCIAL

## 2019-02-06 VITALS
SYSTOLIC BLOOD PRESSURE: 116 MMHG | HEIGHT: 67 IN | HEART RATE: 64 BPM | WEIGHT: 179 LBS | DIASTOLIC BLOOD PRESSURE: 68 MMHG | BODY MASS INDEX: 28.09 KG/M2 | RESPIRATION RATE: 16 BRPM

## 2019-02-06 DIAGNOSIS — E66.9 OBESITY (BMI 30.0-34.9): ICD-10-CM

## 2019-02-06 DIAGNOSIS — I25.10 CORONARY ARTERY DISEASE INVOLVING NATIVE CORONARY ARTERY OF NATIVE HEART WITHOUT ANGINA PECTORIS: Primary | ICD-10-CM

## 2019-02-06 PROCEDURE — G8417 CALC BMI ABV UP PARAM F/U: HCPCS | Performed by: INTERNAL MEDICINE

## 2019-02-06 PROCEDURE — G8482 FLU IMMUNIZE ORDER/ADMIN: HCPCS | Performed by: INTERNAL MEDICINE

## 2019-02-06 PROCEDURE — 1101F PT FALLS ASSESS-DOCD LE1/YR: CPT | Performed by: INTERNAL MEDICINE

## 2019-02-06 PROCEDURE — 1123F ACP DISCUSS/DSCN MKR DOCD: CPT | Performed by: INTERNAL MEDICINE

## 2019-02-06 PROCEDURE — G8598 ASA/ANTIPLAT THER USED: HCPCS | Performed by: INTERNAL MEDICINE

## 2019-02-06 PROCEDURE — 4040F PNEUMOC VAC/ADMIN/RCVD: CPT | Performed by: INTERNAL MEDICINE

## 2019-02-06 PROCEDURE — 99213 OFFICE O/P EST LOW 20 MIN: CPT | Performed by: INTERNAL MEDICINE

## 2019-02-06 PROCEDURE — G8427 DOCREV CUR MEDS BY ELIG CLIN: HCPCS | Performed by: INTERNAL MEDICINE

## 2019-02-06 PROCEDURE — 3017F COLORECTAL CA SCREEN DOC REV: CPT | Performed by: INTERNAL MEDICINE

## 2019-02-06 PROCEDURE — 1036F TOBACCO NON-USER: CPT | Performed by: INTERNAL MEDICINE

## 2019-02-06 RX ORDER — PHENTERMINE HYDROCHLORIDE 37.5 MG/1
37.5 TABLET ORAL
Qty: 30 TABLET | Refills: 0 | Status: SHIPPED | OUTPATIENT
Start: 2019-02-06 | End: 2019-03-08

## 2019-03-04 DIAGNOSIS — E11.9 TYPE 2 DIABETES MELLITUS WITHOUT COMPLICATION, WITH LONG-TERM CURRENT USE OF INSULIN (HCC): ICD-10-CM

## 2019-03-04 DIAGNOSIS — Z79.4 TYPE 2 DIABETES MELLITUS WITHOUT COMPLICATION, WITH LONG-TERM CURRENT USE OF INSULIN (HCC): ICD-10-CM

## 2019-03-06 ENCOUNTER — OFFICE VISIT (OUTPATIENT)
Dept: CARDIOLOGY CLINIC | Age: 74
End: 2019-03-06
Payer: COMMERCIAL

## 2019-03-06 VITALS
DIASTOLIC BLOOD PRESSURE: 62 MMHG | HEART RATE: 68 BPM | HEIGHT: 67 IN | WEIGHT: 175 LBS | BODY MASS INDEX: 27.47 KG/M2 | SYSTOLIC BLOOD PRESSURE: 132 MMHG | RESPIRATION RATE: 16 BRPM

## 2019-03-06 DIAGNOSIS — I25.10 CORONARY ARTERY DISEASE INVOLVING NATIVE CORONARY ARTERY OF NATIVE HEART WITHOUT ANGINA PECTORIS: Primary | ICD-10-CM

## 2019-03-06 PROCEDURE — 1123F ACP DISCUSS/DSCN MKR DOCD: CPT | Performed by: INTERNAL MEDICINE

## 2019-03-06 PROCEDURE — 99214 OFFICE O/P EST MOD 30 MIN: CPT | Performed by: INTERNAL MEDICINE

## 2019-03-06 PROCEDURE — G8417 CALC BMI ABV UP PARAM F/U: HCPCS | Performed by: INTERNAL MEDICINE

## 2019-03-06 PROCEDURE — 3017F COLORECTAL CA SCREEN DOC REV: CPT | Performed by: INTERNAL MEDICINE

## 2019-03-06 PROCEDURE — 1036F TOBACCO NON-USER: CPT | Performed by: INTERNAL MEDICINE

## 2019-03-06 PROCEDURE — G8598 ASA/ANTIPLAT THER USED: HCPCS | Performed by: INTERNAL MEDICINE

## 2019-03-06 PROCEDURE — G8427 DOCREV CUR MEDS BY ELIG CLIN: HCPCS | Performed by: INTERNAL MEDICINE

## 2019-03-06 PROCEDURE — G8482 FLU IMMUNIZE ORDER/ADMIN: HCPCS | Performed by: INTERNAL MEDICINE

## 2019-03-06 PROCEDURE — 4040F PNEUMOC VAC/ADMIN/RCVD: CPT | Performed by: INTERNAL MEDICINE

## 2019-03-06 PROCEDURE — 1101F PT FALLS ASSESS-DOCD LE1/YR: CPT | Performed by: INTERNAL MEDICINE

## 2019-05-01 DIAGNOSIS — E78.00 PURE HYPERCHOLESTEROLEMIA: ICD-10-CM

## 2019-05-01 RX ORDER — GLIPIZIDE 5 MG/1
5 TABLET ORAL 2 TIMES DAILY
Qty: 180 TABLET | Refills: 3 | Status: SHIPPED | OUTPATIENT
Start: 2019-05-01 | End: 2019-05-17

## 2019-05-01 RX ORDER — ATORVASTATIN CALCIUM 40 MG/1
80 TABLET, FILM COATED ORAL NIGHTLY
Qty: 180 TABLET | Refills: 11 | Status: SHIPPED | OUTPATIENT
Start: 2019-05-01 | End: 2019-07-23 | Stop reason: SDUPTHER

## 2019-05-16 ENCOUNTER — OFFICE VISIT (OUTPATIENT)
Dept: INTERNAL MEDICINE CLINIC | Age: 74
End: 2019-05-16
Payer: COMMERCIAL

## 2019-05-16 VITALS
BODY MASS INDEX: 27.25 KG/M2 | SYSTOLIC BLOOD PRESSURE: 138 MMHG | DIASTOLIC BLOOD PRESSURE: 70 MMHG | HEART RATE: 66 BPM | RESPIRATION RATE: 18 BRPM | OXYGEN SATURATION: 98 % | WEIGHT: 171.4 LBS

## 2019-05-16 DIAGNOSIS — E78.2 MIXED HYPERLIPIDEMIA: ICD-10-CM

## 2019-05-16 DIAGNOSIS — N18.30 CHRONIC RENAL INSUFFICIENCY, STAGE 3 (MODERATE) (HCC): ICD-10-CM

## 2019-05-16 DIAGNOSIS — E11.9 TYPE 2 DIABETES MELLITUS WITHOUT COMPLICATION, WITH LONG-TERM CURRENT USE OF INSULIN (HCC): ICD-10-CM

## 2019-05-16 DIAGNOSIS — I10 ESSENTIAL HYPERTENSION: ICD-10-CM

## 2019-05-16 DIAGNOSIS — Z79.4 TYPE 2 DIABETES MELLITUS WITHOUT COMPLICATION, WITH LONG-TERM CURRENT USE OF INSULIN (HCC): ICD-10-CM

## 2019-05-16 DIAGNOSIS — E78.2 MIXED HYPERLIPIDEMIA: Primary | ICD-10-CM

## 2019-05-16 LAB
A/G RATIO: 1.5 (ref 1.1–2.2)
ALBUMIN SERPL-MCNC: 4.5 G/DL (ref 3.4–5)
ALP BLD-CCNC: 128 U/L (ref 40–129)
ALT SERPL-CCNC: 34 U/L (ref 10–40)
ANION GAP SERPL CALCULATED.3IONS-SCNC: 13 MMOL/L (ref 3–16)
AST SERPL-CCNC: 23 U/L (ref 15–37)
BASOPHILS ABSOLUTE: 0 K/UL (ref 0–0.2)
BASOPHILS RELATIVE PERCENT: 0.5 %
BILIRUB SERPL-MCNC: 0.7 MG/DL (ref 0–1)
BUN BLDV-MCNC: 26 MG/DL (ref 7–20)
CALCIUM SERPL-MCNC: 9.7 MG/DL (ref 8.3–10.6)
CHLORIDE BLD-SCNC: 100 MMOL/L (ref 99–110)
CHOLESTEROL, TOTAL: 148 MG/DL (ref 0–199)
CO2: 22 MMOL/L (ref 21–32)
CREAT SERPL-MCNC: 1.4 MG/DL (ref 0.8–1.3)
EOSINOPHILS ABSOLUTE: 0.1 K/UL (ref 0–0.6)
EOSINOPHILS RELATIVE PERCENT: 1.3 %
GFR AFRICAN AMERICAN: >60
GFR NON-AFRICAN AMERICAN: 50
GLOBULIN: 3.1 G/DL
GLUCOSE BLD-MCNC: 296 MG/DL (ref 70–99)
HCT VFR BLD CALC: 41.9 % (ref 40.5–52.5)
HDLC SERPL-MCNC: 37 MG/DL (ref 40–60)
HEMOGLOBIN: 14.2 G/DL (ref 13.5–17.5)
LDL CHOLESTEROL CALCULATED: 88 MG/DL
LYMPHOCYTES ABSOLUTE: 1.3 K/UL (ref 1–5.1)
LYMPHOCYTES RELATIVE PERCENT: 20.3 %
MCH RBC QN AUTO: 32.5 PG (ref 26–34)
MCHC RBC AUTO-ENTMCNC: 33.9 G/DL (ref 31–36)
MCV RBC AUTO: 96 FL (ref 80–100)
MONOCYTES ABSOLUTE: 0.5 K/UL (ref 0–1.3)
MONOCYTES RELATIVE PERCENT: 8.1 %
NEUTROPHILS ABSOLUTE: 4.6 K/UL (ref 1.7–7.7)
NEUTROPHILS RELATIVE PERCENT: 69.8 %
PDW BLD-RTO: 14 % (ref 12.4–15.4)
PLATELET # BLD: 181 K/UL (ref 135–450)
PMV BLD AUTO: 9.5 FL (ref 5–10.5)
POTASSIUM SERPL-SCNC: 5.1 MMOL/L (ref 3.5–5.1)
RBC # BLD: 4.37 M/UL (ref 4.2–5.9)
SODIUM BLD-SCNC: 135 MMOL/L (ref 136–145)
TOTAL PROTEIN: 7.6 G/DL (ref 6.4–8.2)
TRIGL SERPL-MCNC: 116 MG/DL (ref 0–150)
VLDLC SERPL CALC-MCNC: 23 MG/DL
WBC # BLD: 6.6 K/UL (ref 4–11)

## 2019-05-16 PROCEDURE — 99214 OFFICE O/P EST MOD 30 MIN: CPT | Performed by: INTERNAL MEDICINE

## 2019-05-16 PROCEDURE — 3046F HEMOGLOBIN A1C LEVEL >9.0%: CPT | Performed by: INTERNAL MEDICINE

## 2019-05-16 PROCEDURE — 3017F COLORECTAL CA SCREEN DOC REV: CPT | Performed by: INTERNAL MEDICINE

## 2019-05-16 PROCEDURE — 1123F ACP DISCUSS/DSCN MKR DOCD: CPT | Performed by: INTERNAL MEDICINE

## 2019-05-16 PROCEDURE — 4040F PNEUMOC VAC/ADMIN/RCVD: CPT | Performed by: INTERNAL MEDICINE

## 2019-05-16 PROCEDURE — G8427 DOCREV CUR MEDS BY ELIG CLIN: HCPCS | Performed by: INTERNAL MEDICINE

## 2019-05-16 PROCEDURE — 3288F FALL RISK ASSESSMENT DOCD: CPT | Performed by: INTERNAL MEDICINE

## 2019-05-16 PROCEDURE — G8510 SCR DEP NEG, NO PLAN REQD: HCPCS | Performed by: INTERNAL MEDICINE

## 2019-05-16 PROCEDURE — G8417 CALC BMI ABV UP PARAM F/U: HCPCS | Performed by: INTERNAL MEDICINE

## 2019-05-16 PROCEDURE — G8598 ASA/ANTIPLAT THER USED: HCPCS | Performed by: INTERNAL MEDICINE

## 2019-05-16 PROCEDURE — 1036F TOBACCO NON-USER: CPT | Performed by: INTERNAL MEDICINE

## 2019-05-16 PROCEDURE — 2022F DILAT RTA XM EVC RTNOPTHY: CPT | Performed by: INTERNAL MEDICINE

## 2019-05-16 ASSESSMENT — PATIENT HEALTH QUESTIONNAIRE - PHQ9
2. FEELING DOWN, DEPRESSED OR HOPELESS: 0
SUM OF ALL RESPONSES TO PHQ9 QUESTIONS 1 & 2: 0
SUM OF ALL RESPONSES TO PHQ QUESTIONS 1-9: 0
SUM OF ALL RESPONSES TO PHQ QUESTIONS 1-9: 0
1. LITTLE INTEREST OR PLEASURE IN DOING THINGS: 0

## 2019-05-16 NOTE — PROGRESS NOTES
Tristen BENTON Ji  [birth year]  05/16/19    SUBJECTIVE:    Pt has developed nausea when he takes the tuojeo or lantus. Sugars have been 160-210. The patient is taking hypertensive medications compliantly without side effects. Denies chest pain, dyspnea, edema, or TIA's. Blood pressure has been 122/70. Patient denies any chest pain, shortness of breath, myalgias, Patient is tolerating cholesterol medications without difficulty. OBJECTIVE:    /70   Pulse 66   Resp 18   Wt 171 lb 6.4 oz (77.7 kg)   SpO2 98%   BMI 27.25 kg/m²     Physical Exam   Constitutional: He is oriented to person, place, and time. He appears well-developed and well-nourished. Eyes: Conjunctivae are normal. No scleral icterus. Neck: Neck supple. No JVD present. No tracheal deviation present. No thyromegaly present. Cardiovascular: Normal rate, regular rhythm, normal heart sounds and intact distal pulses. Pulmonary/Chest: Effort normal and breath sounds normal. No respiratory distress. Abdominal: Soft. He exhibits no distension and no mass. There is no hepatosplenomegaly. There is no tenderness. There is no rebound, no guarding and no CVA tenderness. Musculoskeletal: He exhibits no edema. Lymphadenopathy:     He has no cervical adenopathy. Neurological: He is alert and oriented to person, place, and time. Nonfocal   Skin: No cyanosis. Nails show no clubbing. Psychiatric: He has a normal mood and affect. His behavior is normal. Judgment normal.       ASSESSMENT:    1. Mixed hyperlipidemia    2. Essential hypertension    3. Type 2 diabetes mellitus without complication, with long-term current use of insulin ([facility]    4. Chronic renal insufficiency, stage 3 (moderate) ([facility])        PLAN:    Nita Jj was seen today for other. Diagnoses and all orders for this visit:    Mixed hyperlipidemia - check labs; cont statin  -     Comprehensive Metabolic Panel; Future  -     Lipid Panel;  Future  -     Hemoglobin A1C; Future  -     CBC Auto Differential; Future    Essential hypertension - at goal; check labs  -     Comprehensive Metabolic Panel; Future  -     Lipid Panel; Future  -     Hemoglobin A1C; Future  -     CBC Auto Differential; Future    Type 2 diabetes mellitus without complication, with long-term current use of insulin ([facility] - unclear why he is not able top tolerate insulin; I am concerned that the a1c will be elevated. If so, will try metformin again (discussed other options, but many are cost prohibitive)  -     Comprehensive Metabolic Panel; Future  -     Lipid Panel;  Future  -     Hemoglobin A1C; Future  -     CBC Auto Differential; Future    Chronic renal insufficiency, stage 3 (moderate) ([facility]) - check labs

## 2019-05-17 LAB
ESTIMATED AVERAGE GLUCOSE: 231.7 MG/DL
HBA1C MFR BLD: 9.7 %

## 2019-05-17 RX ORDER — GLIPIZIDE 5 MG/1
10 TABLET ORAL 2 TIMES DAILY
Qty: 180 TABLET | Refills: 3
Start: 2019-05-17 | End: 2019-05-17

## 2019-05-17 RX ORDER — GLIPIZIDE 10 MG/1
10 TABLET ORAL 2 TIMES DAILY
Qty: 180 TABLET | Refills: 3 | Status: SHIPPED | OUTPATIENT
Start: 2019-05-17 | End: 2019-09-17 | Stop reason: SDUPTHER

## 2019-07-23 DIAGNOSIS — E78.00 PURE HYPERCHOLESTEROLEMIA: ICD-10-CM

## 2019-07-23 RX ORDER — ATORVASTATIN CALCIUM 40 MG/1
80 TABLET, FILM COATED ORAL NIGHTLY
Qty: 180 TABLET | Refills: 3 | Status: SHIPPED | OUTPATIENT
Start: 2019-07-23 | End: 2019-09-11 | Stop reason: DRUGHIGH

## 2019-09-03 ENCOUNTER — HOSPITAL ENCOUNTER (OUTPATIENT)
Age: 74
Discharge: HOME | End: 2019-09-03

## 2019-09-03 LAB
ANION GAP SERPL CALCULATED.3IONS-SCNC: 15 MMOL/L (ref 4–16)
BUN BLDV-MCNC: 12 MG/DL (ref 6–23)
CALCIUM SERPL-MCNC: 9.1 MG/DL (ref 8.3–10.6)
CHLORIDE BLD-SCNC: 102 MMOL/L (ref 99–110)
CO2: 22 MMOL/L (ref 21–32)
CREAT SERPL-MCNC: 0.6 MG/DL (ref 0.9–1.3)
GFR AFRICAN AMERICAN: >60 ML/MIN/1.73M2
GFR NON-AFRICAN AMERICAN: >60 ML/MIN/1.73M2
GLUCOSE BLD-MCNC: 134 MG/DL (ref 70–99)
HCT VFR BLD CALC: 40.4 % (ref 42–52)
HEMOGLOBIN: 11.9 GM/DL (ref 13.5–18)
INR BLD: 2.08 INDEX
MCH RBC QN AUTO: 28.1 PG (ref 27–31)
MCHC RBC AUTO-ENTMCNC: 29.5 % (ref 32–36)
MCV RBC AUTO: 95.3 FL (ref 78–100)
PDW BLD-RTO: 20.2 % (ref 11.7–14.9)
PLATELET # BLD: 502 K/CU MM (ref 140–440)
PMV BLD AUTO: 9.7 FL (ref 7.5–11.1)
POTASSIUM SERPL-SCNC: 4.4 MMOL/L (ref 3.5–5.1)
PROTHROMBIN TIME: 24.3 SECONDS (ref 9.12–12.5)
RBC # BLD: 4.24 M/CU MM (ref 4.6–6.2)
SODIUM BLD-SCNC: 139 MMOL/L (ref 135–145)
WBC # BLD: 8.8 K/CU MM (ref 4–10.5)

## 2019-09-03 PROCEDURE — 36415 COLL VENOUS BLD VENIPUNCTURE: CPT

## 2019-09-03 PROCEDURE — 85027 COMPLETE CBC AUTOMATED: CPT

## 2019-09-03 PROCEDURE — 80048 BASIC METABOLIC PNL TOTAL CA: CPT

## 2019-09-03 PROCEDURE — 85610 PROTHROMBIN TIME: CPT

## 2019-09-06 ENCOUNTER — HOSPITAL ENCOUNTER (OUTPATIENT)
Age: 74
Discharge: HOME | End: 2019-09-06

## 2019-09-11 ENCOUNTER — OFFICE VISIT (OUTPATIENT)
Dept: CARDIOLOGY CLINIC | Age: 74
End: 2019-09-11
Payer: COMMERCIAL

## 2019-09-11 VITALS
RESPIRATION RATE: 16 BRPM | HEIGHT: 67 IN | SYSTOLIC BLOOD PRESSURE: 148 MMHG | DIASTOLIC BLOOD PRESSURE: 70 MMHG | WEIGHT: 174 LBS | BODY MASS INDEX: 27.31 KG/M2 | HEART RATE: 68 BPM

## 2019-09-11 DIAGNOSIS — I25.10 CORONARY ARTERY DISEASE INVOLVING NATIVE CORONARY ARTERY OF NATIVE HEART WITHOUT ANGINA PECTORIS: Primary | ICD-10-CM

## 2019-09-11 PROCEDURE — G8598 ASA/ANTIPLAT THER USED: HCPCS | Performed by: INTERNAL MEDICINE

## 2019-09-11 PROCEDURE — 1036F TOBACCO NON-USER: CPT | Performed by: INTERNAL MEDICINE

## 2019-09-11 PROCEDURE — G8427 DOCREV CUR MEDS BY ELIG CLIN: HCPCS | Performed by: INTERNAL MEDICINE

## 2019-09-11 PROCEDURE — 3017F COLORECTAL CA SCREEN DOC REV: CPT | Performed by: INTERNAL MEDICINE

## 2019-09-11 PROCEDURE — G8417 CALC BMI ABV UP PARAM F/U: HCPCS | Performed by: INTERNAL MEDICINE

## 2019-09-11 PROCEDURE — 4040F PNEUMOC VAC/ADMIN/RCVD: CPT | Performed by: INTERNAL MEDICINE

## 2019-09-11 PROCEDURE — 99214 OFFICE O/P EST MOD 30 MIN: CPT | Performed by: INTERNAL MEDICINE

## 2019-09-11 PROCEDURE — 1123F ACP DISCUSS/DSCN MKR DOCD: CPT | Performed by: INTERNAL MEDICINE

## 2019-09-11 RX ORDER — ATORVASTATIN CALCIUM 80 MG/1
TABLET, FILM COATED ORAL
Refills: 3 | COMMUNITY
Start: 2019-07-23 | End: 2020-01-10

## 2019-09-11 NOTE — PROGRESS NOTES
Martha Flores MD        OFFICE  FOLLOWUP NOTE    Chief complaints: patient is here for management of CAD, DM, HTN, dyspnea, DYSLPIDEMIA, CKD and obesity    Subjective: patient has rt knee arthritis    HPI Beverly Reid is a 69 y. o.year old who  has a past medical history of CAD (coronary artery disease), Cerebral artery occlusion with cerebral infarction ([address] Chronic back pain, Diabetes mellitus ([address] Erectile dysfunction, Gastritis, Glaucoma, H/O cardiovascular stress test, H/O echocardiogram, H/O left heart catheterization by cutdown, Hx of echocardiogram, Hyperlipidemia, Hypertension, Polyp of gallbladder, PVD (peripheral vascular disease) ([address] Teeth missing, and Wears glasses. and presents for management of CAD, DM, HTN, dyspnea, DYSLPIDEMIA, CKD and obesity which are well controlled except for DM, he will need preop for rt knee.       Current Outpatient Medications   Medication Sig Dispense Refill    atorvastatin (LIPITOR) 80 MG tablet TAKE 1 TABLET BY MOUTH NIGHTLY  3    metFORMIN (GLUCOPHAGE) 500 MG tablet Take 2 tablets by mouth 2 times daily (with meals) 360 tablet 3    glipiZIDE (GLUCOTROL) 10 MG tablet Take 1 tablet by mouth 2 times daily 180 tablet 3    ONE TOUCH ULTRA TEST strip USE 1 STRIP TO CHECK GLUCOSE THREE TIMES DAILY 100 strip 3    metoprolol tartrate (LOPRESSOR) 25 MG tablet Take 1 tablet by mouth 2 times daily 180 tablet 3    lisinopril (PRINIVIL;ZESTRIL) 20 MG tablet Take 1 tablet by mouth every morning 90 tablet 3    ONETOUCH DELICA LANCETS FINE MISC Test three times daily or prn 100 each 6    UNABLE TO FIND Please administer two SHINGRIX vaccines 2-6 months apart 2 Units 0    fluticasone (FLONASE) 50 MCG/ACT nasal spray USE ONE SPRAY(S) IN EACH NOSTRIL ONCE DAILY 1 Bottle 11    Timolol Maleate (TIMOPTIC OP) Apply to eye every morning One Drop Right Eye Every Morning      latanoprost (XALATAN) 0.005 % ophthalmic solution Place 1 drop into both eyes nightly 1 Bottle 6    aspirin 325 MG EC tablet Take 325 mg by mouth nightly Over The Counter       No current facility-administered medications for this visit. Allergies: Penicillins and Sulfa antibiotics  Past Medical History:   Diagnosis Date    CAD (coronary artery disease)     Sees Dr. Tin Brock Cerebral artery occlusion with cerebral infarction [facility]) Dx 1990's    \"TIA's\" No Residual     Chronic back pain     Diabetes mellitus ([facility] [address] Dx 2002    Erectile dysfunction     Gastritis     4/13 EGD - mild gastritis    Glaucoma     Bilateral Eyes    H/O cardiovascular stress test 5-25-12, 2011, 2009 2012, Ragini, EF 54 %, Normal pattern of perfusion in all regions.  H/O echocardiogram 2009,2005 2009, EF 45-50%, Trace MR & TR    H/O left heart catheterization by cutdown 08/2015    Hx of echocardiogram 01/04/2017    No significant disease. EF 35-40%    Hyperlipidemia     5/12 Stress WNL, EF 54%; 8/11 - Stress Test WNL, EF 58%    Hypertension     Polyp of gallbladder     PVD (peripheral vascular disease) ([facility] [address] 08/10/2016    [facility] sent copy of Bilat Lower Arterial US, scanned into Epic. Mild PVD .     Teeth missing     Upper And Lower    Wears glasses     To Read     Past Surgical History:   Procedure Laterality Date    CARDIAC CATHETERIZATION  Last Done 01/18/2017    CARPAL TUNNEL RELEASE Bilateral 1979    Done At Separate Times    CHOLECYSTECTOMY, LAPAROSCOPIC N/A 08/08/2017    COLONOSCOPY  10/2007    adenoma xl Sara    COLONOSCOPY  1/27/16    polyp x2    DENTAL SURGERY      Teeth Extracted In Past    ENDOSCOPY, COLON, DIAGNOSTIC  4-9-13    gastritis    EYE SURGERY Bilateral 1990's    Cataracts With Lens Implants    KNEE SURGERY Left 2012    TOENAIL EXCISION Right 1997    Great Toe    VASECTOMY  1987     Family History   Problem Relation Age of Onset    Early Death Mother 47    Heart Disease Mother     Diabetes Father     Heart Attack Sister     Heart Disease Sister         Heart

## 2019-09-17 DIAGNOSIS — Z23 NEEDS FLU SHOT: Primary | ICD-10-CM

## 2019-09-17 PROCEDURE — 90471 IMMUNIZATION ADMIN: CPT | Performed by: INTERNAL MEDICINE

## 2019-09-17 PROCEDURE — 90662 IIV NO PRSV INCREASED AG IM: CPT | Performed by: INTERNAL MEDICINE

## 2019-09-17 RX ORDER — GLIPIZIDE 10 MG/1
10 TABLET ORAL 2 TIMES DAILY
Qty: 180 TABLET | Refills: 3 | Status: SHIPPED | OUTPATIENT
Start: 2019-09-17 | End: 2020-01-20 | Stop reason: SDUPTHER

## 2019-11-08 ENCOUNTER — OFFICE VISIT (OUTPATIENT)
Dept: INTERNAL MEDICINE CLINIC | Age: 74
End: 2019-11-08
Payer: COMMERCIAL

## 2019-11-08 VITALS
BODY MASS INDEX: 28.04 KG/M2 | HEART RATE: 73 BPM | SYSTOLIC BLOOD PRESSURE: 134 MMHG | WEIGHT: 179 LBS | OXYGEN SATURATION: 99 % | RESPIRATION RATE: 18 BRPM | DIASTOLIC BLOOD PRESSURE: 76 MMHG

## 2019-11-08 DIAGNOSIS — Z79.4 TYPE 2 DIABETES MELLITUS WITHOUT COMPLICATION, WITH LONG-TERM CURRENT USE OF INSULIN (HCC): ICD-10-CM

## 2019-11-08 DIAGNOSIS — I50.22 CHRONIC SYSTOLIC CONGESTIVE HEART FAILURE (HCC): ICD-10-CM

## 2019-11-08 DIAGNOSIS — E11.21 TYPE 2 DIABETES MELLITUS WITH DIABETIC NEPHROPATHY, WITHOUT LONG-TERM CURRENT USE OF INSULIN (HCC): ICD-10-CM

## 2019-11-08 DIAGNOSIS — E11.9 TYPE 2 DIABETES MELLITUS WITHOUT COMPLICATION, WITH LONG-TERM CURRENT USE OF INSULIN (HCC): ICD-10-CM

## 2019-11-08 DIAGNOSIS — N18.30 CHRONIC RENAL INSUFFICIENCY, STAGE 3 (MODERATE) (HCC): ICD-10-CM

## 2019-11-08 DIAGNOSIS — E78.2 MIXED HYPERLIPIDEMIA: ICD-10-CM

## 2019-11-08 DIAGNOSIS — I50.22 CHRONIC SYSTOLIC CONGESTIVE HEART FAILURE (HCC): Primary | ICD-10-CM

## 2019-11-08 DIAGNOSIS — I73.9 PAD (PERIPHERAL ARTERY DISEASE) (HCC): ICD-10-CM

## 2019-11-08 DIAGNOSIS — I10 ESSENTIAL HYPERTENSION: ICD-10-CM

## 2019-11-08 LAB
A/G RATIO: 2.4 (ref 1.1–2.2)
ALBUMIN SERPL-MCNC: 5.2 G/DL (ref 3.4–5)
ALP BLD-CCNC: 94 U/L (ref 40–129)
ALT SERPL-CCNC: 32 U/L (ref 10–40)
ANION GAP SERPL CALCULATED.3IONS-SCNC: 15 MMOL/L (ref 3–16)
AST SERPL-CCNC: 26 U/L (ref 15–37)
BASOPHILS ABSOLUTE: 0 K/UL (ref 0–0.2)
BASOPHILS RELATIVE PERCENT: 0.8 %
BILIRUB SERPL-MCNC: 0.5 MG/DL (ref 0–1)
BUN BLDV-MCNC: 44 MG/DL (ref 7–20)
CALCIUM SERPL-MCNC: 9 MG/DL (ref 8.3–10.6)
CHLORIDE BLD-SCNC: 106 MMOL/L (ref 99–110)
CHOLESTEROL, TOTAL: 118 MG/DL (ref 0–199)
CO2: 20 MMOL/L (ref 21–32)
CREAT SERPL-MCNC: 1.7 MG/DL (ref 0.8–1.3)
CREATININE URINE: 89.9 MG/DL (ref 39–259)
EOSINOPHILS ABSOLUTE: 0.1 K/UL (ref 0–0.6)
EOSINOPHILS RELATIVE PERCENT: 1.4 %
GFR AFRICAN AMERICAN: 48
GFR NON-AFRICAN AMERICAN: 40
GLOBULIN: 2.2 G/DL
GLUCOSE BLD-MCNC: 170 MG/DL (ref 70–99)
HCT VFR BLD CALC: 40.1 % (ref 40.5–52.5)
HDLC SERPL-MCNC: 33 MG/DL (ref 40–60)
HEMOGLOBIN: 13.3 G/DL (ref 13.5–17.5)
LDL CHOLESTEROL CALCULATED: 63 MG/DL
LYMPHOCYTES ABSOLUTE: 1.7 K/UL (ref 1–5.1)
LYMPHOCYTES RELATIVE PERCENT: 28.1 %
MCH RBC QN AUTO: 32.5 PG (ref 26–34)
MCHC RBC AUTO-ENTMCNC: 33.2 G/DL (ref 31–36)
MCV RBC AUTO: 97.7 FL (ref 80–100)
MICROALBUMIN UR-MCNC: 1.8 MG/DL
MICROALBUMIN/CREAT UR-RTO: 20 MG/G (ref 0–30)
MONOCYTES ABSOLUTE: 0.5 K/UL (ref 0–1.3)
MONOCYTES RELATIVE PERCENT: 9 %
NEUTROPHILS ABSOLUTE: 3.6 K/UL (ref 1.7–7.7)
NEUTROPHILS RELATIVE PERCENT: 60.7 %
PDW BLD-RTO: 14 % (ref 12.4–15.4)
PLATELET # BLD: 176 K/UL (ref 135–450)
PMV BLD AUTO: 9.7 FL (ref 5–10.5)
POTASSIUM SERPL-SCNC: 5.5 MMOL/L (ref 3.5–5.1)
RBC # BLD: 4.1 M/UL (ref 4.2–5.9)
SODIUM BLD-SCNC: 141 MMOL/L (ref 136–145)
TOTAL PROTEIN: 7.4 G/DL (ref 6.4–8.2)
TRIGL SERPL-MCNC: 110 MG/DL (ref 0–150)
VLDLC SERPL CALC-MCNC: 22 MG/DL
WBC # BLD: 5.9 K/UL (ref 4–11)

## 2019-11-08 PROCEDURE — 3046F HEMOGLOBIN A1C LEVEL >9.0%: CPT | Performed by: INTERNAL MEDICINE

## 2019-11-08 PROCEDURE — 2022F DILAT RTA XM EVC RTNOPTHY: CPT | Performed by: INTERNAL MEDICINE

## 2019-11-08 PROCEDURE — 3017F COLORECTAL CA SCREEN DOC REV: CPT | Performed by: INTERNAL MEDICINE

## 2019-11-08 PROCEDURE — 1123F ACP DISCUSS/DSCN MKR DOCD: CPT | Performed by: INTERNAL MEDICINE

## 2019-11-08 PROCEDURE — G8482 FLU IMMUNIZE ORDER/ADMIN: HCPCS | Performed by: INTERNAL MEDICINE

## 2019-11-08 PROCEDURE — 36415 COLL VENOUS BLD VENIPUNCTURE: CPT | Performed by: INTERNAL MEDICINE

## 2019-11-08 PROCEDURE — G8417 CALC BMI ABV UP PARAM F/U: HCPCS | Performed by: INTERNAL MEDICINE

## 2019-11-08 PROCEDURE — G8427 DOCREV CUR MEDS BY ELIG CLIN: HCPCS | Performed by: INTERNAL MEDICINE

## 2019-11-08 PROCEDURE — 1036F TOBACCO NON-USER: CPT | Performed by: INTERNAL MEDICINE

## 2019-11-08 PROCEDURE — 4040F PNEUMOC VAC/ADMIN/RCVD: CPT | Performed by: INTERNAL MEDICINE

## 2019-11-08 PROCEDURE — G8598 ASA/ANTIPLAT THER USED: HCPCS | Performed by: INTERNAL MEDICINE

## 2019-11-08 PROCEDURE — 99214 OFFICE O/P EST MOD 30 MIN: CPT | Performed by: INTERNAL MEDICINE

## 2019-11-08 RX ORDER — FLUTICASONE PROPIONATE 50 MCG
SPRAY, SUSPENSION (ML) NASAL
Qty: 1 BOTTLE | Refills: 11 | Status: CANCELLED | OUTPATIENT
Start: 2019-11-08

## 2019-11-08 RX ORDER — INSULIN LISPRO 100 [IU]/ML
5 INJECTION, SOLUTION INTRAVENOUS; SUBCUTANEOUS
Qty: 1 PEN | Refills: 0
Start: 2019-11-08 | End: 2020-01-22 | Stop reason: ALTCHOICE

## 2019-11-09 LAB
ESTIMATED AVERAGE GLUCOSE: 188.6 MG/DL
HBA1C MFR BLD: 8.2 %

## 2019-11-11 DIAGNOSIS — R06.02 SOB (SHORTNESS OF BREATH): ICD-10-CM

## 2019-11-11 DIAGNOSIS — E87.5 HIGH SERUM POTASSIUM LEVEL: Primary | ICD-10-CM

## 2019-11-11 RX ORDER — FUROSEMIDE 20 MG/1
20 TABLET ORAL EVERY OTHER DAY
Qty: 45 TABLET | Refills: 3 | Status: SHIPPED | OUTPATIENT
Start: 2019-11-11 | End: 2019-11-18 | Stop reason: SINTOL

## 2019-11-15 DIAGNOSIS — E87.5 HIGH SERUM POTASSIUM LEVEL: ICD-10-CM

## 2019-11-15 DIAGNOSIS — E87.5 HIGH SERUM POTASSIUM LEVEL: Primary | ICD-10-CM

## 2019-11-15 LAB
ANION GAP SERPL CALCULATED.3IONS-SCNC: 15 MMOL/L (ref 3–16)
BUN BLDV-MCNC: 43 MG/DL (ref 7–20)
CALCIUM SERPL-MCNC: 9.2 MG/DL (ref 8.3–10.6)
CHLORIDE BLD-SCNC: 102 MMOL/L (ref 99–110)
CO2: 20 MMOL/L (ref 21–32)
CREAT SERPL-MCNC: 1.6 MG/DL (ref 0.8–1.3)
GFR AFRICAN AMERICAN: 52
GFR NON-AFRICAN AMERICAN: 43
GLUCOSE BLD-MCNC: 177 MG/DL (ref 70–99)
POTASSIUM SERPL-SCNC: 5.8 MMOL/L (ref 3.5–5.1)
SODIUM BLD-SCNC: 137 MMOL/L (ref 136–145)

## 2019-11-18 DIAGNOSIS — E87.5 HIGH SERUM POTASSIUM LEVEL: Primary | ICD-10-CM

## 2019-11-21 DIAGNOSIS — E87.5 HIGH SERUM POTASSIUM LEVEL: ICD-10-CM

## 2019-11-21 LAB
ANION GAP SERPL CALCULATED.3IONS-SCNC: 14 MMOL/L (ref 3–16)
BUN BLDV-MCNC: 27 MG/DL (ref 7–20)
CALCIUM SERPL-MCNC: 9 MG/DL (ref 8.3–10.6)
CHLORIDE BLD-SCNC: 104 MMOL/L (ref 99–110)
CO2: 23 MMOL/L (ref 21–32)
CREAT SERPL-MCNC: 1.5 MG/DL (ref 0.8–1.3)
GFR AFRICAN AMERICAN: 56
GFR NON-AFRICAN AMERICAN: 46
GLUCOSE BLD-MCNC: 136 MG/DL (ref 70–99)
POTASSIUM SERPL-SCNC: 4.9 MMOL/L (ref 3.5–5.1)
SODIUM BLD-SCNC: 141 MMOL/L (ref 136–145)

## 2019-11-21 PROCEDURE — 36415 COLL VENOUS BLD VENIPUNCTURE: CPT | Performed by: INTERNAL MEDICINE

## 2019-11-22 DIAGNOSIS — I10 HYPERTENSION: ICD-10-CM

## 2019-11-22 DIAGNOSIS — E87.5 HIGH SERUM POTASSIUM LEVEL: Primary | ICD-10-CM

## 2019-11-22 RX ORDER — LISINOPRIL 2.5 MG/1
2.5 TABLET ORAL DAILY
Qty: 30 TABLET | Refills: 11 | Status: SHIPPED | OUTPATIENT
Start: 2019-11-22 | End: 2020-01-22 | Stop reason: ALTCHOICE

## 2019-12-02 DIAGNOSIS — E87.5 HIGH SERUM POTASSIUM LEVEL: ICD-10-CM

## 2019-12-02 LAB
ANION GAP SERPL CALCULATED.3IONS-SCNC: 15 MMOL/L (ref 3–16)
BUN BLDV-MCNC: 35 MG/DL (ref 7–20)
CALCIUM SERPL-MCNC: 8.9 MG/DL (ref 8.3–10.6)
CHLORIDE BLD-SCNC: 101 MMOL/L (ref 99–110)
CO2: 22 MMOL/L (ref 21–32)
CREAT SERPL-MCNC: 1.3 MG/DL (ref 0.8–1.3)
GFR AFRICAN AMERICAN: >60
GFR NON-AFRICAN AMERICAN: 54
GLUCOSE BLD-MCNC: 269 MG/DL (ref 70–99)
POTASSIUM SERPL-SCNC: 4.9 MMOL/L (ref 3.5–5.1)
SODIUM BLD-SCNC: 138 MMOL/L (ref 136–145)

## 2019-12-02 PROCEDURE — 36415 COLL VENOUS BLD VENIPUNCTURE: CPT | Performed by: INTERNAL MEDICINE

## 2020-01-10 RX ORDER — ATORVASTATIN CALCIUM 80 MG/1
TABLET, FILM COATED ORAL
Qty: 90 TABLET | Refills: 3 | Status: SHIPPED | OUTPATIENT
Start: 2020-01-10 | End: 2021-01-04

## 2020-01-10 RX ORDER — LISINOPRIL 20 MG/1
TABLET ORAL
Qty: 90 TABLET | Refills: 3 | Status: SHIPPED | OUTPATIENT
Start: 2020-01-10 | End: 2020-09-08 | Stop reason: SDUPTHER

## 2020-01-10 RX ORDER — GLIPIZIDE 10 MG/1
TABLET, FILM COATED, EXTENDED RELEASE ORAL
Qty: 90 TABLET | Refills: 3 | Status: SHIPPED | OUTPATIENT
Start: 2020-01-10 | End: 2020-01-20

## 2020-01-14 RX ORDER — BLOOD-GLUCOSE METER
EACH MISCELLANEOUS
Qty: 1 KIT | Refills: 0 | Status: SHIPPED | OUTPATIENT
Start: 2020-01-14 | End: 2022-10-19 | Stop reason: SDUPTHER

## 2020-01-20 ENCOUNTER — TELEPHONE (OUTPATIENT)
Dept: INTERNAL MEDICINE CLINIC | Age: 75
End: 2020-01-20

## 2020-01-20 RX ORDER — GLIPIZIDE 10 MG/1
10 TABLET ORAL 2 TIMES DAILY
Qty: 180 TABLET | Refills: 3 | Status: SHIPPED | OUTPATIENT
Start: 2020-01-20 | End: 2020-11-10 | Stop reason: SDUPTHER

## 2020-01-20 NOTE — TELEPHONE ENCOUNTER
Pt called about glipizide stating he thought he was to take 2 times daily. Most recent rx sent states 1 time daily, as it was changed to extended release. Please advise.

## 2020-01-22 ENCOUNTER — OFFICE VISIT (OUTPATIENT)
Dept: CARDIOLOGY CLINIC | Age: 75
End: 2020-01-22
Payer: MEDICARE

## 2020-01-22 VITALS
DIASTOLIC BLOOD PRESSURE: 74 MMHG | RESPIRATION RATE: 16 BRPM | HEIGHT: 67 IN | BODY MASS INDEX: 28.41 KG/M2 | SYSTOLIC BLOOD PRESSURE: 142 MMHG | WEIGHT: 181 LBS | HEART RATE: 68 BPM

## 2020-01-22 PROCEDURE — 4040F PNEUMOC VAC/ADMIN/RCVD: CPT | Performed by: INTERNAL MEDICINE

## 2020-01-22 PROCEDURE — G8427 DOCREV CUR MEDS BY ELIG CLIN: HCPCS | Performed by: INTERNAL MEDICINE

## 2020-01-22 PROCEDURE — 99214 OFFICE O/P EST MOD 30 MIN: CPT | Performed by: INTERNAL MEDICINE

## 2020-01-22 PROCEDURE — 1036F TOBACCO NON-USER: CPT | Performed by: INTERNAL MEDICINE

## 2020-01-22 PROCEDURE — G8482 FLU IMMUNIZE ORDER/ADMIN: HCPCS | Performed by: INTERNAL MEDICINE

## 2020-01-22 PROCEDURE — G8417 CALC BMI ABV UP PARAM F/U: HCPCS | Performed by: INTERNAL MEDICINE

## 2020-01-22 PROCEDURE — 3017F COLORECTAL CA SCREEN DOC REV: CPT | Performed by: INTERNAL MEDICINE

## 2020-01-22 PROCEDURE — 1123F ACP DISCUSS/DSCN MKR DOCD: CPT | Performed by: INTERNAL MEDICINE

## 2020-01-22 RX ORDER — TIMOLOL MALEATE 5 MG/ML
SOLUTION/ DROPS OPHTHALMIC
COMMUNITY
Start: 2020-01-13

## 2020-01-22 RX ORDER — FUROSEMIDE 20 MG/1
TABLET ORAL
COMMUNITY
Start: 2020-01-05 | End: 2020-11-10 | Stop reason: SDUPTHER

## 2020-01-22 RX ORDER — PHENTERMINE HYDROCHLORIDE 37.5 MG/1
37.5 TABLET ORAL
Qty: 30 TABLET | Refills: 0 | Status: SHIPPED | OUTPATIENT
Start: 2020-01-22 | End: 2020-02-21

## 2020-01-22 NOTE — PROGRESS NOTES
NIGHTLY 90 tablet 3    metoprolol tartrate (LOPRESSOR) 25 MG tablet TAKE 1 TABLET BY MOUTH TWICE A  tablet 3    fluticasone (FLONASE) 50 MCG/ACT nasal spray USE ONE SPRAY(S) IN EACH NOSTRIL ONCE DAILY (Patient taking differently: as needed ) 1 Bottle 11    Timolol Maleate (TIMOPTIC OP) Apply to eye every morning One Drop Right Eye Every Morning      latanoprost (XALATAN) 0.005 % ophthalmic solution Place 1 drop into both eyes nightly 1 Bottle 6    UNABLE TO FIND Please administer two SHINGRIX vaccines 2-6 months apart (Patient not taking: Reported on 1/22/2020) 2 Units 0    aspirin 325 MG EC tablet Take 325 mg by mouth nightly Over The Counter       No current facility-administered medications for this visit. Allergies: Penicillins and Sulfa antibiotics  Past Medical History:   Diagnosis Date    CAD (coronary artery disease)     Sees Dr. Jessica Charles Cerebral artery occlusion with cerebral infarction [facility]) Dx 1990's    \"TIA's\" No Residual     Chronic back pain     Diabetes mellitus ([facility] [address] Dx 2002    Erectile dysfunction     Gastritis     4/13 EGD - mild gastritis    Glaucoma     Bilateral Eyes    H/O cardiovascular stress test 5-25-12, 2011, 2009 2012, Ragini, EF 54 %, Normal pattern of perfusion in all regions.  H/O echocardiogram 2009,2005 2009, EF 45-50%, Trace MR & TR    H/O left heart catheterization by cutdown 08/2015    Hx of echocardiogram 01/04/2017    No significant disease. EF 35-40%    Hyperlipidemia     5/12 Stress WNL, EF 54%; 8/11 - Stress Test WNL, EF 58%    Hypertension     Polyp of gallbladder     PVD (peripheral vascular disease) ([address] 08/10/2016    [facility] sent copy of Bilat Lower Arterial US, scanned into Epic. Mild PVD .     Teeth missing     Upper And Lower    Wears glasses     To Read     Past Surgical History:   Procedure Laterality Date    CARDIAC CATHETERIZATION  Last Done 01/18/2017    CARPAL TUNNEL RELEASE Bilateral 1979    Done At Separate abnormalities. All sensory and motor nerves examined and were normal  Psychiatric: normal mood and affect    No results found for: CKTOTAL, CKMB, CKMBINDEX, TROPONINI  BNP:  No results found for: BNP  PT/INR:  No results found for: PTINR  Lab Results   Component Value Date    LABA1C 8.2 11/08/2019    LABA1C 9.7 05/16/2019     Lab Results   Component Value Date    CHOL 118 11/08/2019    TRIG 110 11/08/2019    HDL 33 (L) 11/08/2019    LDLCALC 63 11/08/2019     Lab Results   Component Value Date    ALT 32 11/08/2019    AST 26 11/08/2019     TSH:  No results found for: TSH    Impression:  Orlando Zhou is a 69 y. o.year old who  has a past medical history of CAD (coronary artery disease), Cerebral artery occlusion with cerebral infarction ([facility] [address] Chronic back pain, Diabetes mellitus ([facility] [address] Erectile dysfunction, Gastritis, Glaucoma, H/O cardiovascular stress test, H/O echocardiogram, H/O left heart catheterization by cutdown, Hx of echocardiogram, Hyperlipidemia, Hypertension, Polyp of gallbladder, PVD (peripheral vascular disease) ([facility] [address] Teeth missing, and Wears glasses. and presents with     Plan:    1. CAD and cardiomyopathy : [facility] showed mild LAD disease and ventriculogram showed normal LVEF.,Continue aspirin,continue statins, ACEinhibitors, betablockers  2. Obesity: initially lost 20 lbs, 3 months of adipex completed, and then regained 7 lbs, will restart his adipex,recommend 1200 calories/day  3. DM: could not start jardiance as it was expensive, continue metformin and glipizide  4. Dyslipidemia: continue statins  5. HTN: stable, continue lopressor and lisinopril medications  6. CKD: stable, Health maintenance: exeriseAll labs, medications and tests reviewed, continue all other medications of all above medical condition All labs, medications and tests reviewed, continue all other medications of all above medical condition listed as is.

## 2020-05-11 ENCOUNTER — OFFICE VISIT (OUTPATIENT)
Dept: INTERNAL MEDICINE CLINIC | Age: 75
End: 2020-05-11
Payer: MEDICARE

## 2020-05-11 VITALS
DIASTOLIC BLOOD PRESSURE: 72 MMHG | OXYGEN SATURATION: 99 % | HEART RATE: 73 BPM | SYSTOLIC BLOOD PRESSURE: 140 MMHG | RESPIRATION RATE: 18 BRPM | WEIGHT: 184 LBS | BODY MASS INDEX: 28.82 KG/M2

## 2020-05-11 PROCEDURE — G8417 CALC BMI ABV UP PARAM F/U: HCPCS | Performed by: INTERNAL MEDICINE

## 2020-05-11 PROCEDURE — 1036F TOBACCO NON-USER: CPT | Performed by: INTERNAL MEDICINE

## 2020-05-11 PROCEDURE — 3017F COLORECTAL CA SCREEN DOC REV: CPT | Performed by: INTERNAL MEDICINE

## 2020-05-11 PROCEDURE — 99214 OFFICE O/P EST MOD 30 MIN: CPT | Performed by: INTERNAL MEDICINE

## 2020-05-11 PROCEDURE — G8427 DOCREV CUR MEDS BY ELIG CLIN: HCPCS | Performed by: INTERNAL MEDICINE

## 2020-05-11 PROCEDURE — 3046F HEMOGLOBIN A1C LEVEL >9.0%: CPT | Performed by: INTERNAL MEDICINE

## 2020-05-11 PROCEDURE — 4040F PNEUMOC VAC/ADMIN/RCVD: CPT | Performed by: INTERNAL MEDICINE

## 2020-05-11 PROCEDURE — 1123F ACP DISCUSS/DSCN MKR DOCD: CPT | Performed by: INTERNAL MEDICINE

## 2020-05-11 PROCEDURE — 2022F DILAT RTA XM EVC RTNOPTHY: CPT | Performed by: INTERNAL MEDICINE

## 2020-05-11 RX ORDER — TIZANIDINE 2 MG/1
2-4 TABLET ORAL 4 TIMES DAILY PRN
Qty: 120 TABLET | Refills: 2 | Status: SHIPPED | OUTPATIENT
Start: 2020-05-11 | End: 2020-11-10 | Stop reason: SDUPTHER

## 2020-05-11 RX ORDER — METOPROLOL TARTRATE 50 MG/1
50 TABLET, FILM COATED ORAL 2 TIMES DAILY
Qty: 180 TABLET | Refills: 3 | Status: SHIPPED | OUTPATIENT
Start: 2020-05-11 | End: 2020-11-10 | Stop reason: SDUPTHER

## 2020-05-11 ASSESSMENT — PATIENT HEALTH QUESTIONNAIRE - PHQ9
2. FEELING DOWN, DEPRESSED OR HOPELESS: 0
1. LITTLE INTEREST OR PLEASURE IN DOING THINGS: 0
SUM OF ALL RESPONSES TO PHQ9 QUESTIONS 1 & 2: 0
SUM OF ALL RESPONSES TO PHQ QUESTIONS 1-9: 0
SUM OF ALL RESPONSES TO PHQ QUESTIONS 1-9: 0

## 2020-05-11 NOTE — PROGRESS NOTES
Tristen Workman  [birth year]  05/11/20    SUBJECTIVE:      Pt with pain in the left buttocks, down the posterior leg to the feet. He does have some numbness in the left foot as well. Symptoms started 2 months ago, and vary day to day. Walking, pushing the  worsens the symptoms. Ice, heat, tylenol provide little benefit. He denies any weakness. He does have pain in the right mid thoracic back, present for the last 2 months. This started when he was chopping wood. This trends to bother him mostly at night when he is trying to sleep. He does have muscle spasms on the right mid back as well. Patient compliant with medications for diabetes. Blood sugars have averaged around 150-175, with pt checking blood sugar 1 time daily. Patient has had no episodes of significant hypoglycemia. The patient is taking hypertensive medications compliantly without side effects. Denies chest pain, dyspnea, edema, or TIA's. He is using lasix every other day. He continues on lisinopril and metoprolol for the CHF. He does have mild CRI, is on lisinopril. Pt does have mild PAD, does not cause any symptoms. OBJECTIVE:    BP (!) 140/72   Pulse 73   Resp 18   Wt 184 lb (83.5 kg)   SpO2 99%   BMI 28.82 kg/m²     Physical Exam  Constitutional:       Appearance: He is well-developed. Eyes:      General: No scleral icterus. Conjunctiva/sclera: Conjunctivae normal.   Neck:      Musculoskeletal: Neck supple. Thyroid: No thyromegaly. Vascular: No JVD. Trachea: No tracheal deviation. Cardiovascular:      Rate and Rhythm: Normal rate and regular rhythm. Heart sounds: Normal heart sounds. Pulmonary:      Effort: Pulmonary effort is normal. No respiratory distress. Breath sounds: Normal breath sounds. Abdominal:      General: There is no distension. Palpations: Abdomen is soft. There is no mass. Tenderness: There is no abdominal tenderness.  There is no guarding or rebound. Musculoskeletal:      Lumbar back: He exhibits tenderness (paraspinal muscle tenderness). He exhibits normal range of motion and no bony tenderness. Lymphadenopathy:      Cervical: No cervical adenopathy. Skin:     Nails: There is no clubbing. Neurological:      Mental Status: He is alert and oriented to person, place, and time. Comments: Nonfocal   Psychiatric:         Behavior: Behavior normal.         Judgment: Judgment normal.         ASSESSMENT:    1. Essential hypertension    2. Type 2 diabetes mellitus without complication, with long-term current use of insulin ([address]    3. Chronic renal insufficiency, stage 3 (moderate) (HCC)    4. Mixed hyperlipidemia    5. Chronic systolic congestive heart failure ([address]    6. PAD (peripheral artery disease) ([address]    7. Sciatica of left side        PLAN:    Elsi Thomas was seen today for back pain. Diagnoses and all orders for this visit:    Essential hypertension - will increase metoprolol to 50mg BID; check labs  -     Comprehensive Metabolic Panel; Future  -     Lipid Panel; Future  -     CBC Auto Differential; Future  -     metoprolol tartrate (LOPRESSOR) 50 MG tablet; Take 1 tablet by mouth 2 times daily    Type 2 diabetes mellitus without complication, with long-term current use of insulin ([address] - check labs  -     Comprehensive Metabolic Panel; Future  -     Lipid Panel; Future  -     Hemoglobin A1C; Future    Chronic renal insufficiency, stage 3 (moderate) (HCC) - check labs, cont ACE-I  -     Comprehensive Metabolic Panel; Future  -     Lipid Panel; Future    Mixed hyperlipidemia - cot statin  -     Comprehensive Metabolic Panel; Future  -     Lipid Panel; Future    Chronic systolic congestive heart failure (HCC) - on ACE-I, BB; lasix PRN; increase BB  -     Comprehensive Metabolic Panel; Future  -     Lipid Panel; Future    PAD (peripheral artery disease) (HCC) - no current symptoms; no change   -     Comprehensive Metabolic Panel;  Future  - Lipid Panel; Future    Sciatica of left side - Tx with back exercises, zanaflex (more for mid back pain) for now  -     tiZANidine (ZANAFLEX) 2 MG tablet;  Take 1-2 tablets by mouth 4 times daily as needed (back pain)

## 2020-05-12 ENCOUNTER — HOSPITAL ENCOUNTER (OUTPATIENT)
Age: 75
Discharge: HOME | End: 2020-05-12
Payer: MEDICARE

## 2020-05-12 LAB
ALBUMIN SERPL-MCNC: 4 GM/DL (ref 3.4–5)
ALP BLD-CCNC: 80 IU/L (ref 40–128)
ALT SERPL-CCNC: 18 U/L (ref 10–40)
ANION GAP SERPL CALCULATED.3IONS-SCNC: 13 MMOL/L (ref 4–16)
AST SERPL-CCNC: 17 IU/L (ref 15–37)
BASOPHILS ABSOLUTE: 0 K/CU MM
BASOPHILS RELATIVE PERCENT: 0.7 % (ref 0–1)
BILIRUB SERPL-MCNC: 0.4 MG/DL (ref 0–1)
BUN BLDV-MCNC: 23 MG/DL (ref 6–23)
CALCIUM SERPL-MCNC: 8.8 MG/DL (ref 8.3–10.6)
CHLORIDE BLD-SCNC: 105 MMOL/L (ref 99–110)
CHOLESTEROL: 97 MG/DL
CO2: 20 MMOL/L (ref 21–32)
CREAT SERPL-MCNC: 1.4 MG/DL (ref 0.9–1.3)
DIFFERENTIAL TYPE: ABNORMAL
EOSINOPHILS ABSOLUTE: 0.1 K/CU MM
EOSINOPHILS RELATIVE PERCENT: 1.3 % (ref 0–3)
ESTIMATED AVERAGE GLUCOSE: 192 MG/DL
GFR AFRICAN AMERICAN: >60 ML/MIN/1.73M2
GFR NON-AFRICAN AMERICAN: 50 ML/MIN/1.73M2
GLUCOSE BLD-MCNC: 193 MG/DL (ref 70–99)
HBA1C MFR BLD: 8.3 % (ref 4.2–6.3)
HCT VFR BLD CALC: 36.8 % (ref 42–52)
HDLC SERPL-MCNC: 31 MG/DL
HEMOGLOBIN: 11.4 GM/DL (ref 13.5–18)
IMMATURE NEUTROPHIL %: 0.3 % (ref 0–0.43)
LDL CHOLESTEROL DIRECT: 55 MG/DL
LYMPHOCYTES ABSOLUTE: 1.4 K/CU MM
LYMPHOCYTES RELATIVE PERCENT: 23.6 % (ref 24–44)
MCH RBC QN AUTO: 31.1 PG (ref 27–31)
MCHC RBC AUTO-ENTMCNC: 31 % (ref 32–36)
MCV RBC AUTO: 100.3 FL (ref 78–100)
MONOCYTES ABSOLUTE: 0.6 K/CU MM
MONOCYTES RELATIVE PERCENT: 9.5 % (ref 0–4)
NUCLEATED RBC %: 0 %
PDW BLD-RTO: 13.2 % (ref 11.7–14.9)
PLATELET # BLD: 240 K/CU MM (ref 140–440)
PMV BLD AUTO: 11.9 FL (ref 7.5–11.1)
POTASSIUM SERPL-SCNC: 4.9 MMOL/L (ref 3.5–5.1)
RBC # BLD: 3.67 M/CU MM (ref 4.6–6.2)
SEGMENTED NEUTROPHILS ABSOLUTE COUNT: 3.9 K/CU MM
SEGMENTED NEUTROPHILS RELATIVE PERCENT: 64.6 % (ref 36–66)
SODIUM BLD-SCNC: 138 MMOL/L (ref 135–145)
TOTAL IMMATURE NEUTOROPHIL: 0.02 K/CU MM
TOTAL NUCLEATED RBC: 0 K/CU MM
TOTAL PROTEIN: 6.2 GM/DL (ref 6.4–8.2)
TRIGL SERPL-MCNC: 83 MG/DL
WBC # BLD: 6 K/CU MM (ref 4–10.5)

## 2020-05-12 PROCEDURE — 83721 ASSAY OF BLOOD LIPOPROTEIN: CPT

## 2020-05-12 PROCEDURE — 85025 COMPLETE CBC W/AUTO DIFF WBC: CPT

## 2020-05-12 PROCEDURE — 83036 HEMOGLOBIN GLYCOSYLATED A1C: CPT

## 2020-05-12 PROCEDURE — 80053 COMPREHEN METABOLIC PANEL: CPT

## 2020-05-12 PROCEDURE — 36415 COLL VENOUS BLD VENIPUNCTURE: CPT

## 2020-05-12 PROCEDURE — 80061 LIPID PANEL: CPT

## 2020-05-13 RX ORDER — PIOGLITAZONEHYDROCHLORIDE 15 MG/1
15 TABLET ORAL DAILY
Qty: 30 TABLET | Refills: 3 | Status: SHIPPED | OUTPATIENT
Start: 2020-05-13 | End: 2020-09-08 | Stop reason: SDUPTHER

## 2020-09-08 RX ORDER — LISINOPRIL 20 MG/1
20 TABLET ORAL DAILY
Qty: 90 TABLET | Refills: 3 | Status: SHIPPED | OUTPATIENT
Start: 2020-09-08 | End: 2021-09-27

## 2020-09-08 RX ORDER — PIOGLITAZONEHYDROCHLORIDE 15 MG/1
15 TABLET ORAL DAILY
Qty: 90 TABLET | Refills: 3 | Status: SHIPPED | OUTPATIENT
Start: 2020-09-08 | End: 2021-06-07 | Stop reason: SDUPTHER

## 2020-10-08 ENCOUNTER — TELEPHONE (OUTPATIENT)
Dept: INTERNAL MEDICINE CLINIC | Age: 75
End: 2020-10-08

## 2020-10-08 NOTE — TELEPHONE ENCOUNTER
The pt called in wanting to know if you can call in something for his sciatica pain he said he was on a tractor yesterday and now it is bothering him.

## 2020-11-10 ENCOUNTER — OFFICE VISIT (OUTPATIENT)
Dept: INTERNAL MEDICINE CLINIC | Age: 75
End: 2020-11-10
Payer: MEDICARE

## 2020-11-10 VITALS
BODY MASS INDEX: 29.98 KG/M2 | WEIGHT: 191.4 LBS | HEART RATE: 62 BPM | DIASTOLIC BLOOD PRESSURE: 60 MMHG | RESPIRATION RATE: 16 BRPM | SYSTOLIC BLOOD PRESSURE: 110 MMHG | OXYGEN SATURATION: 98 %

## 2020-11-10 PROBLEM — E66.01 CLASS 3 SEVERE OBESITY DUE TO EXCESS CALORIES WITHOUT SERIOUS COMORBIDITY IN ADULT (HCC): Status: ACTIVE | Noted: 2020-11-10

## 2020-11-10 PROBLEM — E66.813 CLASS 3 SEVERE OBESITY DUE TO EXCESS CALORIES WITHOUT SERIOUS COMORBIDITY IN ADULT: Status: ACTIVE | Noted: 2020-11-10

## 2020-11-10 LAB
A/G RATIO: 1.7 (ref 1.1–2.2)
ALBUMIN SERPL-MCNC: 4.4 G/DL (ref 3.4–5)
ALP BLD-CCNC: 100 U/L (ref 40–129)
ALT SERPL-CCNC: 28 U/L (ref 10–40)
ANION GAP SERPL CALCULATED.3IONS-SCNC: 14 MMOL/L (ref 3–16)
AST SERPL-CCNC: 24 U/L (ref 15–37)
BASOPHILS ABSOLUTE: 0 K/UL (ref 0–0.2)
BASOPHILS RELATIVE PERCENT: 0.6 %
BILIRUB SERPL-MCNC: 0.5 MG/DL (ref 0–1)
BUN BLDV-MCNC: 34 MG/DL (ref 7–20)
CALCIUM SERPL-MCNC: 9 MG/DL (ref 8.3–10.6)
CHLORIDE BLD-SCNC: 103 MMOL/L (ref 99–110)
CHOLESTEROL, TOTAL: 119 MG/DL (ref 0–199)
CO2: 22 MMOL/L (ref 21–32)
CREAT SERPL-MCNC: 1.4 MG/DL (ref 0.8–1.3)
CREATININE URINE: 79.7 MG/DL (ref 39–259)
EOSINOPHILS ABSOLUTE: 0.1 K/UL (ref 0–0.6)
EOSINOPHILS RELATIVE PERCENT: 2.1 %
GFR AFRICAN AMERICAN: >60
GFR NON-AFRICAN AMERICAN: 50
GLOBULIN: 2.6 G/DL
GLUCOSE BLD-MCNC: 163 MG/DL (ref 70–99)
HCT VFR BLD CALC: 34.3 % (ref 40.5–52.5)
HDLC SERPL-MCNC: 37 MG/DL (ref 40–60)
HEMOGLOBIN: 11.4 G/DL (ref 13.5–17.5)
LDL CHOLESTEROL CALCULATED: 61 MG/DL
LYMPHOCYTES ABSOLUTE: 1.4 K/UL (ref 1–5.1)
LYMPHOCYTES RELATIVE PERCENT: 31.4 %
MCH RBC QN AUTO: 32.1 PG (ref 26–34)
MCHC RBC AUTO-ENTMCNC: 33.4 G/DL (ref 31–36)
MCV RBC AUTO: 96.3 FL (ref 80–100)
MICROALBUMIN UR-MCNC: 2 MG/DL
MICROALBUMIN/CREAT UR-RTO: 25.1 MG/G (ref 0–30)
MONOCYTES ABSOLUTE: 0.5 K/UL (ref 0–1.3)
MONOCYTES RELATIVE PERCENT: 10.3 %
NEUTROPHILS ABSOLUTE: 2.4 K/UL (ref 1.7–7.7)
NEUTROPHILS RELATIVE PERCENT: 55.6 %
PDW BLD-RTO: 14.2 % (ref 12.4–15.4)
PLATELET # BLD: 160 K/UL (ref 135–450)
PMV BLD AUTO: 8.9 FL (ref 5–10.5)
POTASSIUM SERPL-SCNC: 4.9 MMOL/L (ref 3.5–5.1)
RBC # BLD: 3.56 M/UL (ref 4.2–5.9)
SODIUM BLD-SCNC: 139 MMOL/L (ref 136–145)
TOTAL PROTEIN: 7 G/DL (ref 6.4–8.2)
TRIGL SERPL-MCNC: 105 MG/DL (ref 0–150)
VLDLC SERPL CALC-MCNC: 21 MG/DL
WBC # BLD: 4.4 K/UL (ref 4–11)

## 2020-11-10 PROCEDURE — 1036F TOBACCO NON-USER: CPT | Performed by: INTERNAL MEDICINE

## 2020-11-10 PROCEDURE — 3017F COLORECTAL CA SCREEN DOC REV: CPT | Performed by: INTERNAL MEDICINE

## 2020-11-10 PROCEDURE — G8417 CALC BMI ABV UP PARAM F/U: HCPCS | Performed by: INTERNAL MEDICINE

## 2020-11-10 PROCEDURE — G8484 FLU IMMUNIZE NO ADMIN: HCPCS | Performed by: INTERNAL MEDICINE

## 2020-11-10 PROCEDURE — G8427 DOCREV CUR MEDS BY ELIG CLIN: HCPCS | Performed by: INTERNAL MEDICINE

## 2020-11-10 PROCEDURE — 99214 OFFICE O/P EST MOD 30 MIN: CPT | Performed by: INTERNAL MEDICINE

## 2020-11-10 PROCEDURE — 3052F HG A1C>EQUAL 8.0%<EQUAL 9.0%: CPT | Performed by: INTERNAL MEDICINE

## 2020-11-10 PROCEDURE — 36415 COLL VENOUS BLD VENIPUNCTURE: CPT | Performed by: INTERNAL MEDICINE

## 2020-11-10 PROCEDURE — 4040F PNEUMOC VAC/ADMIN/RCVD: CPT | Performed by: INTERNAL MEDICINE

## 2020-11-10 PROCEDURE — 1123F ACP DISCUSS/DSCN MKR DOCD: CPT | Performed by: INTERNAL MEDICINE

## 2020-11-10 PROCEDURE — 2022F DILAT RTA XM EVC RTNOPTHY: CPT | Performed by: INTERNAL MEDICINE

## 2020-11-10 RX ORDER — GLIPIZIDE 10 MG/1
10 TABLET ORAL 2 TIMES DAILY
Qty: 180 TABLET | Refills: 3 | Status: SHIPPED | OUTPATIENT
Start: 2020-11-10 | End: 2021-12-30 | Stop reason: SDUPTHER

## 2020-11-10 RX ORDER — EMPAGLIFLOZIN 10 MG/1
1 TABLET, FILM COATED ORAL DAILY
Qty: 30 TABLET | Refills: 5 | Status: SHIPPED | OUTPATIENT
Start: 2020-11-10 | End: 2021-06-07 | Stop reason: SDUPTHER

## 2020-11-10 RX ORDER — TIZANIDINE 2 MG/1
2-4 TABLET ORAL 4 TIMES DAILY PRN
Qty: 120 TABLET | Refills: 2 | Status: SHIPPED | OUTPATIENT
Start: 2020-11-10 | End: 2021-12-30 | Stop reason: SDUPTHER

## 2020-11-10 RX ORDER — METOPROLOL TARTRATE 50 MG/1
50 TABLET, FILM COATED ORAL 2 TIMES DAILY
Qty: 180 TABLET | Refills: 3 | Status: SHIPPED | OUTPATIENT
Start: 2020-11-10 | End: 2021-06-08 | Stop reason: SDUPTHER

## 2020-11-10 RX ORDER — FUROSEMIDE 20 MG/1
TABLET ORAL
Qty: 60 TABLET | Refills: 5 | Status: SHIPPED | OUTPATIENT
Start: 2020-11-10 | End: 2021-12-13

## 2020-11-10 NOTE — PROGRESS NOTES
Tristen Robbinswitt  [birth year]  11/10/20    SUBJECTIVE:    Pt complains of pain in the neck, mid back, and low back. Pain worsens with turning over in bed, bending over. He denies any numbness or weakness. This has been been present for a couple of weeks. Pt has been to a massage therapist with shot term benefit. He has used heat and tylenol with benefit. Pain has been stable since onset. There was no trigger. Patient compliant with medications for diabetes. Blood sugars have averaged around 100-130 in the evening, 140-150 in the morning, with pt checking blood sugar 2 time daily. Patient has had no episodes of significant hypoglycemia. The patient is taking hypertensive medications compliantly without side effects. Denies chest pain, dyspnea, edema, or TIA's.      weight is up - he has not been able to exercise as much recently. OBJECTIVE:    /60   Pulse 62   Resp 16   Wt 191 lb 6.4 oz (86.8 kg)   SpO2 98%   BMI 29.98 kg/m²     Physical Exam  Constitutional:       Appearance: He is well-developed. Eyes:      General: No scleral icterus. Conjunctiva/sclera: Conjunctivae normal.   Neck:      Musculoskeletal: Neck supple. Thyroid: No thyromegaly. Vascular: No JVD. Trachea: No tracheal deviation. Cardiovascular:      Rate and Rhythm: Normal rate and regular rhythm. Pulses:           Dorsalis pedis pulses are 2+ on the right side and 1+ on the left side. Posterior tibial pulses are 1+ on the right side and 1+ on the left side. Heart sounds: Normal heart sounds. Pulmonary:      Effort: Pulmonary effort is normal. No respiratory distress. Breath sounds: Normal breath sounds. Abdominal:      General: There is no distension. Palpations: Abdomen is soft. There is no mass. Tenderness: There is no abdominal tenderness. There is no guarding or rebound. Lymphadenopathy:      Cervical: No cervical adenopathy.    Skin:     General: Skin is warm and dry.      Nails: There is no clubbing. Comments: No foot ulcerations   Neurological:      Mental Status: He is alert and oriented to person, place, and time. Sensory: No sensory deficit (to monofilament). Comments: Nonfocal   Psychiatric:         Behavior: Behavior normal.         Judgment: Judgment normal.     pain with palpation of paraspinal muscles. ASSESSMENT:    1. Acute bilateral thoracic back pain    2. Essential hypertension    3. Type 2 diabetes mellitus without complication, with long-term current use of insulin ([address]    4. Sciatica of left side    5. Type 2 diabetes mellitus with diabetic nephropathy, without long-term current use of insulin ([facility])    6. Class 3 severe obesity due to excess calories without serious comorbidity in adult, unspecified BMI ([address]    7. Neck pain        PLAN:    Sky Guadarrama was seen today for 6 month follow-up and back pain. Diagnoses and all orders for this visit:    Acute bilateral thoracic back pain - seems muscular in the thoracic, cervicial, and less so lumbar area. Will Tx with exercises, flexeril. Pt will call if he is not improving    Essential hypertension - at goal, check labs  -     metoprolol tartrate (LOPRESSOR) 50 MG tablet; Take 1 tablet by mouth 2 times daily  -     Comprehensive Metabolic Panel; Future  -     Lipid Panel; Future  -     Hemoglobin A1C; Future  -     CBC Auto Differential; Future  -      DIABETES FOOT EXAM  -     Cancel: MICROALBUMIN / CREATININE URINE RATIO; Future    Type 2 diabetes mellitus without complication, with long-term current use of insulin ([address] - check labs. Will try jardiance again - insurance had refused to pay for this in the past and it was prohibitively expensive. If we are able to get this med we may end up stopping the actos   -     metFORMIN (GLUCOPHAGE) 500 MG tablet; Take 2 tablets by mouth 2 times daily (with meals)  -     Comprehensive Metabolic Panel; Future  -     Lipid Panel;  Future  - Hemoglobin A1C; Future  -     CBC Auto Differential; Future  -      DIABETES FOOT EXAM  -     Cancel: MICROALBUMIN / CREATININE URINE RATIO; Future    Sciatica of left side  -     tiZANidine (ZANAFLEX) 2 MG tablet; Take 1-2 tablets by mouth 4 times daily as needed (back pain)    Type 2 diabetes mellitus with diabetic nephropathy, without long-term current use of insulin (HCC) - cont lisinopril; check labs    Class 3 severe obesity due to excess calories without serious comorbidity in adult, unspecified BMI ([facility] - encourage increased activity    Neck pain - as above    Other orders  -     furosemide (LASIX) 20 MG tablet; TAKE 1 TABLET BY MOUTH EVERY OTHER DAY  -     glipiZIDE (GLUCOTROL) 10 MG tablet; Take 1 tablet by mouth 2 times daily  -     empagliflozin (JARDIANCE) 10 MG tablet;  Take 1 tablet by mouth daily

## 2020-11-11 LAB
ESTIMATED AVERAGE GLUCOSE: 177.2 MG/DL
HBA1C MFR BLD: 7.8 %

## 2020-12-22 ENCOUNTER — OFFICE VISIT (OUTPATIENT)
Dept: INTERNAL MEDICINE CLINIC | Age: 75
End: 2020-12-22
Payer: MEDICARE

## 2020-12-22 VITALS
WEIGHT: 191 LBS | SYSTOLIC BLOOD PRESSURE: 138 MMHG | HEART RATE: 81 BPM | BODY MASS INDEX: 29.91 KG/M2 | DIASTOLIC BLOOD PRESSURE: 66 MMHG | RESPIRATION RATE: 18 BRPM | OXYGEN SATURATION: 98 %

## 2020-12-22 LAB
A/G RATIO: 1.8 (ref 1.1–2.2)
ALBUMIN SERPL-MCNC: 4.4 G/DL (ref 3.4–5)
ALP BLD-CCNC: 97 U/L (ref 40–129)
ALT SERPL-CCNC: 20 U/L (ref 10–40)
ANION GAP SERPL CALCULATED.3IONS-SCNC: 14 MMOL/L (ref 3–16)
AST SERPL-CCNC: 18 U/L (ref 15–37)
BASOPHILS ABSOLUTE: 0 K/UL (ref 0–0.2)
BASOPHILS RELATIVE PERCENT: 0.3 %
BILIRUB SERPL-MCNC: 0.4 MG/DL (ref 0–1)
BILIRUBIN URINE: NEGATIVE
BLOOD, URINE: NEGATIVE
BUN BLDV-MCNC: 32 MG/DL (ref 7–20)
CALCIUM SERPL-MCNC: 9.5 MG/DL (ref 8.3–10.6)
CHLORIDE BLD-SCNC: 106 MMOL/L (ref 99–110)
CLARITY: CLEAR
CO2: 21 MMOL/L (ref 21–32)
COLOR: YELLOW
CREAT SERPL-MCNC: 1.6 MG/DL (ref 0.8–1.3)
EOSINOPHILS ABSOLUTE: 0.1 K/UL (ref 0–0.6)
EOSINOPHILS RELATIVE PERCENT: 1.2 %
GFR AFRICAN AMERICAN: 51
GFR NON-AFRICAN AMERICAN: 43
GLOBULIN: 2.4 G/DL
GLUCOSE BLD-MCNC: 107 MG/DL (ref 70–99)
GLUCOSE URINE: >=1000 MG/DL
HCT VFR BLD CALC: 36.8 % (ref 40.5–52.5)
HEMOGLOBIN: 12.3 G/DL (ref 13.5–17.5)
KETONES, URINE: NEGATIVE MG/DL
LEUKOCYTE ESTERASE, URINE: NEGATIVE
LYMPHOCYTES ABSOLUTE: 1.5 K/UL (ref 1–5.1)
LYMPHOCYTES RELATIVE PERCENT: 23.9 %
MCH RBC QN AUTO: 32.3 PG (ref 26–34)
MCHC RBC AUTO-ENTMCNC: 33.5 G/DL (ref 31–36)
MCV RBC AUTO: 96.5 FL (ref 80–100)
MICROSCOPIC EXAMINATION: ABNORMAL
MONOCYTES ABSOLUTE: 0.5 K/UL (ref 0–1.3)
MONOCYTES RELATIVE PERCENT: 8.9 %
NEUTROPHILS ABSOLUTE: 4 K/UL (ref 1.7–7.7)
NEUTROPHILS RELATIVE PERCENT: 65.7 %
NITRITE, URINE: NEGATIVE
PDW BLD-RTO: 14 % (ref 12.4–15.4)
PH UA: 5.5 (ref 5–8)
PLATELET # BLD: 161 K/UL (ref 135–450)
PMV BLD AUTO: 9.2 FL (ref 5–10.5)
POTASSIUM SERPL-SCNC: 4.9 MMOL/L (ref 3.5–5.1)
PROTEIN UA: NEGATIVE MG/DL
RBC # BLD: 3.82 M/UL (ref 4.2–5.9)
SODIUM BLD-SCNC: 141 MMOL/L (ref 136–145)
SPECIFIC GRAVITY UA: 1.03 (ref 1–1.03)
TOTAL PROTEIN: 6.8 G/DL (ref 6.4–8.2)
URINE TYPE: ABNORMAL
UROBILINOGEN, URINE: 0.2 E.U./DL
WBC # BLD: 6.1 K/UL (ref 4–11)

## 2020-12-22 PROCEDURE — G8484 FLU IMMUNIZE NO ADMIN: HCPCS | Performed by: INTERNAL MEDICINE

## 2020-12-22 PROCEDURE — 36415 COLL VENOUS BLD VENIPUNCTURE: CPT | Performed by: INTERNAL MEDICINE

## 2020-12-22 PROCEDURE — G8427 DOCREV CUR MEDS BY ELIG CLIN: HCPCS | Performed by: INTERNAL MEDICINE

## 2020-12-22 PROCEDURE — 81003 URINALYSIS AUTO W/O SCOPE: CPT | Performed by: INTERNAL MEDICINE

## 2020-12-22 PROCEDURE — 3017F COLORECTAL CA SCREEN DOC REV: CPT | Performed by: INTERNAL MEDICINE

## 2020-12-22 PROCEDURE — 1036F TOBACCO NON-USER: CPT | Performed by: INTERNAL MEDICINE

## 2020-12-22 PROCEDURE — 99214 OFFICE O/P EST MOD 30 MIN: CPT | Performed by: INTERNAL MEDICINE

## 2020-12-22 PROCEDURE — 4040F PNEUMOC VAC/ADMIN/RCVD: CPT | Performed by: INTERNAL MEDICINE

## 2020-12-22 PROCEDURE — G8417 CALC BMI ABV UP PARAM F/U: HCPCS | Performed by: INTERNAL MEDICINE

## 2020-12-22 PROCEDURE — 1123F ACP DISCUSS/DSCN MKR DOCD: CPT | Performed by: INTERNAL MEDICINE

## 2020-12-22 NOTE — PROGRESS NOTES
Tristen SERENITY Workman  [birth year]  12/22/20    SUBJECTIVE:    Sunday pt developed a dull pain in the RUQ, which then radiated around to the mid back. He used heat with some benefit. Pain started after he had eaten. He had some nausea without vomiting. He took some pepto with unclear benefit. He denies diarrhea, F/C, blood in the stool/black stools, dysuria, hematuria, change in the color of urine of stool, HB, reflux. He has been moving his bowels normally. Today the symptoms are minimal.     OBJECTIVE:    /66   Pulse 81   Resp 18   Wt 191 lb (86.6 kg)   SpO2 98%   BMI 29.91 kg/m²     Physical Exam  Constitutional:       Appearance: He is well-developed. Eyes:      General: No scleral icterus. Conjunctiva/sclera: Conjunctivae normal.   Neck:      Musculoskeletal: Neck supple. Thyroid: No thyromegaly. Vascular: No JVD. Trachea: No tracheal deviation. Cardiovascular:      Rate and Rhythm: Normal rate and regular rhythm. Heart sounds: Normal heart sounds. Pulmonary:      Effort: Pulmonary effort is normal. No respiratory distress. Breath sounds: Normal breath sounds. Abdominal:      General: Abdomen is flat. Bowel sounds are decreased. There is no distension. Palpations: Abdomen is soft. There is no mass. Tenderness: There is abdominal tenderness (right flank). There is no guarding or rebound. Hernia: No hernia is present. Lymphadenopathy:      Cervical: No cervical adenopathy. Skin:     Nails: There is no clubbing. Neurological:      Mental Status: He is alert and oriented to person, place, and time. Comments: Nonfocal   Psychiatric:         Behavior: Behavior normal.         Judgment: Judgment normal.         ASSESSMENT:    1. Acute right flank pain        PLAN:    Chitra Villaseñor was seen today for back pain, abdominal pain and nausea.     Diagnoses and all orders for this visit: Acute right flank pain - unclear etiology. Symptoms improving so will check labs and urine, otherwise watch for now. If this recurs will check CT  -     URINALYSIS  -     Comprehensive Metabolic Panel;  Future  -     CBC Auto Differential; Future

## 2021-01-29 DIAGNOSIS — E11.9 TYPE 2 DIABETES MELLITUS WITHOUT COMPLICATION, WITH LONG-TERM CURRENT USE OF INSULIN (HCC): ICD-10-CM

## 2021-01-29 DIAGNOSIS — Z79.4 TYPE 2 DIABETES MELLITUS WITHOUT COMPLICATION, WITH LONG-TERM CURRENT USE OF INSULIN (HCC): ICD-10-CM

## 2021-02-02 RX ORDER — BLOOD SUGAR DIAGNOSTIC
1 STRIP MISCELLANEOUS 2 TIMES DAILY
Qty: 300 EACH | Refills: 3 | Status: SHIPPED | OUTPATIENT
Start: 2021-02-02 | End: 2021-02-09 | Stop reason: SDUPTHER

## 2021-02-03 ENCOUNTER — OFFICE VISIT (OUTPATIENT)
Dept: CARDIOLOGY CLINIC | Age: 76
End: 2021-02-03
Payer: MEDICARE

## 2021-02-03 VITALS
RESPIRATION RATE: 18 BRPM | HEIGHT: 67 IN | TEMPERATURE: 97 F | WEIGHT: 191 LBS | DIASTOLIC BLOOD PRESSURE: 64 MMHG | BODY MASS INDEX: 29.98 KG/M2 | HEART RATE: 76 BPM | SYSTOLIC BLOOD PRESSURE: 136 MMHG

## 2021-02-03 DIAGNOSIS — I42.0 DILATED CARDIOMYOPATHY (HCC): Primary | ICD-10-CM

## 2021-02-03 DIAGNOSIS — E66.01 CLASS 3 SEVERE OBESITY DUE TO EXCESS CALORIES WITH SERIOUS COMORBIDITY IN ADULT, UNSPECIFIED BMI (HCC): ICD-10-CM

## 2021-02-03 PROCEDURE — G8417 CALC BMI ABV UP PARAM F/U: HCPCS | Performed by: INTERNAL MEDICINE

## 2021-02-03 PROCEDURE — G8484 FLU IMMUNIZE NO ADMIN: HCPCS | Performed by: INTERNAL MEDICINE

## 2021-02-03 PROCEDURE — 4040F PNEUMOC VAC/ADMIN/RCVD: CPT | Performed by: INTERNAL MEDICINE

## 2021-02-03 PROCEDURE — 99214 OFFICE O/P EST MOD 30 MIN: CPT | Performed by: INTERNAL MEDICINE

## 2021-02-03 PROCEDURE — 1123F ACP DISCUSS/DSCN MKR DOCD: CPT | Performed by: INTERNAL MEDICINE

## 2021-02-03 PROCEDURE — 3017F COLORECTAL CA SCREEN DOC REV: CPT | Performed by: INTERNAL MEDICINE

## 2021-02-03 PROCEDURE — 1036F TOBACCO NON-USER: CPT | Performed by: INTERNAL MEDICINE

## 2021-02-03 PROCEDURE — G8427 DOCREV CUR MEDS BY ELIG CLIN: HCPCS | Performed by: INTERNAL MEDICINE

## 2021-02-03 RX ORDER — PHENTERMINE HYDROCHLORIDE 37.5 MG/1
37.5 TABLET ORAL
Qty: 30 TABLET | Refills: 0 | Status: SHIPPED | OUTPATIENT
Start: 2021-02-03 | End: 2021-03-03

## 2021-02-03 NOTE — PROGRESS NOTES
Imelda Valenzuela MD        OFFICE  FOLLOWUP NOTE    Chief complaints: patient is here for management of  CAD, DM, HTN, dyspnea, DYSLPIDEMIA, CKD and obesity    Subjective: patient feels better, no chest pain, no shortness of breath, no dizziness, no palpitations    HPI Mary Teran is a 70 y. o.year old who  has a past medical history of CAD (coronary artery disease), Cerebral artery occlusion with cerebral infarction ([address] Chronic back pain, Diabetes mellitus ([facility] Erectile dysfunction, Gastritis, Glaucoma, H/O cardiovascular stress test, H/O echocardiogram, H/O left heart catheterization by cutdown, Hx of echocardiogram, Hyperlipidemia, Hypertension, Polyp of gallbladder, PVD (peripheral vascular disease) ([facility] Teeth missing, and Wears glasses. and presents for management of  CAD, DM, HTN, dyspnea, DYSLPIDEMIA, CKD and obesity which are well controlled      Current Outpatient Medications   Medication Sig Dispense Refill    blood glucose test strips (ACCU-CHEK ARYAN PLUS) strip 1 each by In Vitro route 2 times daily Or as needed.  300 each 3    atorvastatin (LIPITOR) 80 MG tablet TAKE 1 TABLET BY MOUTH AT BEDTIME 90 tablet 3    metoprolol tartrate (LOPRESSOR) 50 MG tablet Take 1 tablet by mouth 2 times daily 180 tablet 3    furosemide (LASIX) 20 MG tablet TAKE 1 TABLET BY MOUTH EVERY OTHER DAY 60 tablet 5    glipiZIDE (GLUCOTROL) 10 MG tablet Take 1 tablet by mouth 2 times daily 180 tablet 3    metFORMIN (GLUCOPHAGE) 500 MG tablet Take 2 tablets by mouth 2 times daily (with meals) 360 tablet 3    tiZANidine (ZANAFLEX) 2 MG tablet Take 1-2 tablets by mouth 4 times daily as needed (back pain) 120 tablet 2    empagliflozin (JARDIANCE) 10 MG tablet Take 1 tablet by mouth daily 30 tablet 5    lisinopril (PRINIVIL;ZESTRIL) 20 MG tablet Take 1 tablet by mouth daily 90 tablet 3    pioglitazone (ACTOS) 15 MG tablet Take 1 tablet by mouth daily 90 tablet 3  timolol (TIMOPTIC) 0.5 % ophthalmic solution INSTILL 1 DROP INTO EACH EYE IN THE MORNING      SOFT TOUCH LANCETS MISC Test three times daily or prn for abnormal glucose 100 each 3    Blood Glucose Monitoring Suppl (ACCU-CHEK ARYAN PLUS) w/Device KIT Test three times daily or prn for abnormal glucose 1 kit 0    fluticasone (FLONASE) 50 MCG/ACT nasal spray USE ONE SPRAY(S) IN EACH NOSTRIL ONCE DAILY (Patient taking differently: as needed ) 1 Bottle 11    latanoprost (XALATAN) 0.005 % ophthalmic solution Place 1 drop into both eyes nightly 1 Bottle 6    aspirin 325 MG EC tablet Take 325 mg by mouth nightly Over The Counter       No current facility-administered medications for this visit. Allergies: Penicillins and Sulfa antibiotics  Past Medical History:   Diagnosis Date    CAD (coronary artery disease)     Sees Dr. Eliane Gonzalez Cerebral artery occlusion with cerebral infarction [facility]) Dx 1990's    \"TIA's\" No Residual     Chronic back pain     Diabetes mellitus ([facility] Dx 2002    Erectile dysfunction     Gastritis     4/13 EGD - mild gastritis    Glaucoma     Bilateral Eyes    H/O cardiovascular stress test 5-25-12, 2011, 2009 2012, Ragini, EF 54 %, Normal pattern of perfusion in all regions.  H/O echocardiogram 2009,2005 2009, EF 45-50%, Trace MR & TR    H/O left heart catheterization by cutdown 08/2015    Hx of echocardiogram 01/04/2017    No significant disease. EF 35-40%    Hyperlipidemia     5/12 Stress WNL, EF 54%; 8/11 - Stress Test WNL, EF 58%    Hypertension     Polyp of gallbladder     PVD (peripheral vascular disease) ([facility] 08/10/2016    [facility] sent copy of Bilat Lower Arterial US, scanned into Epic. Mild PVD .     Teeth missing     Upper And Lower    Wears glasses     To Read     Past Surgical History:   Procedure Laterality Date    CARDIAC CATHETERIZATION  Last Done 01/18/2017    CARPAL TUNNEL RELEASE Bilateral 1979    Done At Separate Times  CHOLECYSTECTOMY, LAPAROSCOPIC N/A 2017    COLONOSCOPY  10/2007    adenoma xl Sara    COLONOSCOPY  16    polyp x2    DENTAL SURGERY      Teeth Extracted In Past    ENDOSCOPY, COLON, DIAGNOSTIC  13    gastritis    EYE SURGERY Bilateral     Cataracts With Lens Implants    KNEE SURGERY Left     TOENAIL EXCISION Right     Great Toe    VASECTOMY  1987     Family History   Problem Relation Age of Onset    Early Death Mother 47    Heart Disease Mother     Diabetes Father     Heart Attack Sister     Heart Disease Sister         Heart Attack    Other Sister         Polio    Early Death Brother 52        Heart Attack    Heart Attack Brother     Heart Disease Brother         Heart Attack    Diabetes Brother     Heart Attack Brother     Heart Disease Brother         Heart Attack    Diabetes Sister     Depression Sister      Social History     Tobacco Use    Smoking status: Former Smoker     Packs/day: 0.50     Years: 22.00     Pack years: 11.00     Types: Pipe, Cigars, Cigarettes     Start date:      Quit date:      Years since quittin.    Smokeless tobacco: Former User     Types: Snuff     Quit date:    Substance Use Topics    Alcohol use: Yes     Comment: \"Occ.  Beer Maybe Once A Month\"      [unfilled]  Review of Systems:   · Constitutional: No Fever or Weight Loss   · Eyes: No Decreased Vision  · ENT: No Headaches, Hearing Loss or Vertigo  · Cardiovascular: No chest pain, dyspnea on exertion, palpitations or loss of consciousness  · Respiratory: No cough or wheezing    · Gastrointestinal: No abdominal pain, appetite loss, blood in stools, constipation, diarrhea or heartburn  · Genitourinary: No dysuria, trouble voiding, or hematuria  · Musculoskeletal:  No gait disturbance, weakness or joint complaints  · Integumentary: No rash or pruritis  · Neurological: No TIA or stroke symptoms  · Psychiatric: No anxiety or depression · Endocrine: No malaise, fatigue or temperature intolerance  · Hematologic/Lymphatic: No bleeding problems, blood clots or swollen lymph nodes  · Allergic/Immunologic: No nasal congestion or hives  All systems negative except as marked. Objective:  /64   Pulse 76   Temp 97 °F (36.1 °C)   Resp 18   Ht 5' 7\" (1.702 m)   Wt 191 lb (86.6 kg)   BMI 29.91 kg/m²   Wt Readings from Last 3 Encounters:   02/03/21 191 lb (86.6 kg)   12/22/20 191 lb (86.6 kg)   11/10/20 191 lb 6.4 oz (86.8 kg)     Body mass index is 29.91 kg/m². GENERAL - Alert, oriented, pleasant, in no apparent distress,normal grooming  HEENT  pupils are intact, cornea intact, conjunctive normal color, ears are normal in exam,  Neck - Supple. No jugular venous distention noted. No carotid bruits, no apical -carotid delay  Respiratory:  Normal breath sounds, No respiratory distress, No wheezing, No chest tenderness. ,no use of accessory muscles, diaphragm movement is normal  Cardiovascular: (PMI) apex non displaced,no lifts no thrills, no s3,no s4, Normal heart rate, Normal rhythm, No murmurs, No rubs, No gallops. Carotid arteries pulse and amplitude are normal no bruit, no abdominal bruit noted ( normal abdominal aorta ausculation),   Extremities - No cyanosis, clubbing, or significant edema, no varicose veins    Abdomen  No masses, tenderness, or organomegaly, no hepato-splenomegally, no bruits  Musculoskeletal  No significant edema, no kyphosis or scoliosis, no deformity in any extremity noted, muscle strength and tone are normal  Skin: no ulcer,no scar,no stasis dermatitis   Neurologic  alert oriented times 3,Cranial nerves II through XII are grossly intact. There were no gross focal neurologic abnormalities.    Psychiatric: normal mood and affect    No results found for: CKTOTAL, CKMB, CKMBINDEX, TROPONINI  BNP:  No results found for: BNP  PT/INR:  No results found for: [employer]  Lab Results   Component Value Date LABA1C 7.8 11/10/2020    LABA1C 8.3 (H) 05/12/2020     Lab Results   Component Value Date    CHOL 119 11/10/2020    TRIG 105 11/10/2020    HDL 37 (L) 11/10/2020    LDLCALC 61 11/10/2020    LDLDIRECT 55 05/12/2020     Lab Results   Component Value Date    ALT 20 12/22/2020    AST 18 12/22/2020     TSH:  No results found for: TSH    Impression:  Kanika Hanson is a 70 y. o.year old who  has a past medical history of CAD (coronary artery disease), Cerebral artery occlusion with cerebral infarction ([address] Chronic back pain, Diabetes mellitus ([address] Erectile dysfunction, Gastritis, Glaucoma, H/O cardiovascular stress test, H/O echocardiogram, H/O left heart catheterization by cutdown, Hx of echocardiogram, Hyperlipidemia, Hypertension, Polyp of gallbladder, PVD (peripheral vascular disease) ([address] Teeth missing, and Wears glasses. and presents with     Plan:  1. CAD and cardiomyopathy :ok TO get covid vaccine, [facility] showed mild LAD disease and ventriculogram showed normal LVEF.,Continue aspirin,continue statins, ACEinhibitors, betablockers, repeat echo ( ECHO in 2017 had LEF 35%, however cath LVEF was normal)  2. Obesity: initially lost 20 lbs, and then gained 10 lsb, will restart adipex , will restart his adipex,recommend 1200 calories/day  3. DM: on jardiance ( pays 47 dollars co payment), continue metformin and glipizide and acotos ( acotos can cause fluid retention)  4. Dyslipidemia: continue statins  5. HTN: stable, continue lopressor and lisinopril medications  6. CKD: stable, Health maintenance: exeriseAll labs, medications andAll labs, medications and tests reviewed, continue all other medications of all above medical condition listed as is.     [unfilled]

## 2021-02-05 ENCOUNTER — TELEPHONE (OUTPATIENT)
Dept: CARDIOLOGY CLINIC | Age: 76
End: 2021-02-05

## 2021-02-05 NOTE — TELEPHONE ENCOUNTER
[address] calling again regarding Adipex prescription; patient's BMI is too low to get medication on the basis of weight loss. If there is a heart condition, that would be sufficient.

## 2021-02-09 RX ORDER — BLOOD SUGAR DIAGNOSTIC
1 STRIP MISCELLANEOUS 2 TIMES DAILY
Qty: 300 EACH | Refills: 3 | Status: SHIPPED | OUTPATIENT
Start: 2021-02-09 | End: 2022-10-19 | Stop reason: SDUPTHER

## 2021-02-10 ENCOUNTER — PROCEDURE VISIT (OUTPATIENT)
Dept: CARDIOLOGY CLINIC | Age: 76
End: 2021-02-10
Payer: MEDICARE

## 2021-02-10 DIAGNOSIS — I42.0 DILATED CARDIOMYOPATHY (HCC): Primary | ICD-10-CM

## 2021-02-10 LAB
LV EF: 53 %
LVEF MODALITY: NORMAL

## 2021-02-10 PROCEDURE — 93306 TTE W/DOPPLER COMPLETE: CPT | Performed by: INTERNAL MEDICINE

## 2021-02-17 ENCOUNTER — TELEPHONE (OUTPATIENT)
Dept: CARDIOLOGY CLINIC | Age: 76
End: 2021-02-17

## 2021-02-17 NOTE — TELEPHONE ENCOUNTER
Left ventricular systolic function is normal with an ejection fraction of   50-55%. Grade I diastolic dysfunction. Normal left ventricular size and wall thickness. Hypokinetic motion of the mid anteroseptal wall noted in the left   ventricle. No significant valvular disease noted. Normal pulmonary artery pressure with a RVSP of 22 mmHg. No evidence of pericardial effusion. Pt notified of ECHO test results and f/u; pt voiced understanding.

## 2021-03-03 ENCOUNTER — OFFICE VISIT (OUTPATIENT)
Dept: CARDIOLOGY CLINIC | Age: 76
End: 2021-03-03
Payer: MEDICARE

## 2021-03-03 VITALS
WEIGHT: 187 LBS | DIASTOLIC BLOOD PRESSURE: 72 MMHG | TEMPERATURE: 97 F | SYSTOLIC BLOOD PRESSURE: 138 MMHG | BODY MASS INDEX: 31.92 KG/M2 | HEART RATE: 80 BPM | HEIGHT: 64 IN

## 2021-03-03 DIAGNOSIS — I25.10 CORONARY ARTERY DISEASE INVOLVING NATIVE CORONARY ARTERY OF NATIVE HEART WITHOUT ANGINA PECTORIS: Primary | ICD-10-CM

## 2021-03-03 PROCEDURE — G8417 CALC BMI ABV UP PARAM F/U: HCPCS | Performed by: INTERNAL MEDICINE

## 2021-03-03 PROCEDURE — G8427 DOCREV CUR MEDS BY ELIG CLIN: HCPCS | Performed by: INTERNAL MEDICINE

## 2021-03-03 PROCEDURE — 99214 OFFICE O/P EST MOD 30 MIN: CPT | Performed by: INTERNAL MEDICINE

## 2021-03-03 PROCEDURE — 1123F ACP DISCUSS/DSCN MKR DOCD: CPT | Performed by: INTERNAL MEDICINE

## 2021-03-03 PROCEDURE — 1036F TOBACCO NON-USER: CPT | Performed by: INTERNAL MEDICINE

## 2021-03-03 PROCEDURE — G8484 FLU IMMUNIZE NO ADMIN: HCPCS | Performed by: INTERNAL MEDICINE

## 2021-03-03 PROCEDURE — 4040F PNEUMOC VAC/ADMIN/RCVD: CPT | Performed by: INTERNAL MEDICINE

## 2021-03-03 PROCEDURE — 3017F COLORECTAL CA SCREEN DOC REV: CPT | Performed by: INTERNAL MEDICINE

## 2021-03-03 NOTE — PROGRESS NOTES
Mandie Thurston MD        OFFICE  FOLLOWUP NOTE    Chief complaints: patient is here for management of  CAD, DM, HTN, dyspnea, DYSLPIDEMIA, CKD and obesity  Subjective: patient feels better, no chest pain, no shortness of breath, no dizziness, no palpitations    HPI Jacki Thompson is a 70 y. o.year old who  has a past medical history of CAD (coronary artery disease), Cerebral artery occlusion with cerebral infarction ([address] Chronic back pain, Diabetes mellitus ([facility] [address] Erectile dysfunction, Gastritis, Glaucoma, H/O cardiovascular stress test, H/O echocardiogram, H/O left heart catheterization by cutdown, Hx of echocardiogram, Hyperlipidemia, Hypertension, Polyp of gallbladder, PVD (peripheral vascular disease) ([facility] [address] Teeth missing, and Wears glasses. and presents for management of  CAD, DM, HTN, dyspnea, DYSLPIDEMIA, CKD and obesity which are well controlled    He lost 4.5 lbs on adipex, however,it caused urinary retention and had stopped it last week  Current Outpatient Medications   Medication Sig Dispense Refill    blood glucose test strips (ACCU-CHEK ARYAN PLUS) strip 1 each by In Vitro route 2 times daily Or as needed. 300 each 3    phentermine (ADIPEX-P) 37.5 MG tablet Take 1 tablet by mouth every morning (before breakfast) for 30 days.  30 tablet 0    atorvastatin (LIPITOR) 80 MG tablet TAKE 1 TABLET BY MOUTH AT BEDTIME 90 tablet 3    metoprolol tartrate (LOPRESSOR) 50 MG tablet Take 1 tablet by mouth 2 times daily 180 tablet 3    furosemide (LASIX) 20 MG tablet TAKE 1 TABLET BY MOUTH EVERY OTHER DAY 60 tablet 5    glipiZIDE (GLUCOTROL) 10 MG tablet Take 1 tablet by mouth 2 times daily 180 tablet 3    metFORMIN (GLUCOPHAGE) 500 MG tablet Take 2 tablets by mouth 2 times daily (with meals) 360 tablet 3    tiZANidine (ZANAFLEX) 2 MG tablet Take 1-2 tablets by mouth 4 times daily as needed (back pain) 120 tablet 2    empagliflozin (JARDIANCE) 10 MG tablet Take 1 tablet by mouth daily 30 tablet 5 Past Surgical History:   Procedure Laterality Date    CARDIAC CATHETERIZATION  Last Done 2017    CARPAL TUNNEL RELEASE Bilateral 1979    Done At Separate Times    CHOLECYSTECTOMY, LAPAROSCOPIC N/A 2017    COLONOSCOPY  10/2007    adenoma xl Sara    COLONOSCOPY  16    polyp x2    DENTAL SURGERY      Teeth Extracted In Past    ENDOSCOPY, COLON, DIAGNOSTIC  13    gastritis    EYE SURGERY Bilateral     Cataracts With Lens Implants    KNEE SURGERY Left     TOENAIL EXCISION Right     Great Toe    VASECTOMY       Family History   Problem Relation Age of Onset    Early Death Mother 47    Heart Disease Mother     Diabetes Father     Heart Attack Sister     Heart Disease Sister         Heart Attack    Other Sister         Polio    Early Death Brother 52        Heart Attack    Heart Attack Brother     Heart Disease Brother         Heart Attack    Diabetes Brother     Heart Attack Brother     Heart Disease Brother         Heart Attack    Diabetes Sister     Depression Sister      Social History     Tobacco Use    Smoking status: Former Smoker     Packs/day: 0.50     Years: 22.00     Pack years: 11.00     Types: Pipe, [de-identified], Cigarettes     Start date:      Quit date:      Years since quittin.1    Smokeless tobacco: Former User     Types: Snuff     Quit date:    Substance Use Topics    Alcohol use: Yes     Comment: \"Occ.  Beer Maybe Once A Month\"      [unfilled]  Review of Systems:   · Constitutional: No Fever or Weight Loss   · Eyes: No Decreased Vision  · ENT: No Headaches, Hearing Loss or Vertigo  · Cardiovascular: No chest pain, dyspnea on exertion, palpitations or loss of consciousness  · Respiratory: No cough or wheezing    · Gastrointestinal: No abdominal pain, appetite loss, blood in stools, constipation, diarrhea or heartburn  · Genitourinary: No dysuria, trouble voiding, or hematuria No results found for: CKTOTAL, CKMB, CKMBINDEX, TROPONINI  BNP:  No results found for: BNP  PT/INR:  No results found for: PTINR  Lab Results   Component Value Date    LABA1C 7.8 11/10/2020    LABA1C 8.3 (H) 05/12/2020     Lab Results   Component Value Date    CHOL 119 11/10/2020    TRIG 105 11/10/2020    HDL 37 (L) 11/10/2020    LDLCALC 61 11/10/2020    LDLDIRECT 55 05/12/2020     Lab Results   Component Value Date    ALT 20 12/22/2020    AST 18 12/22/2020     TSH:  No results found for: TSH    Impression:  Barbara Pimentel is a 70 y. o.year old who  has a past medical history of CAD (coronary artery disease), Cerebral artery occlusion with cerebral infarction ([address] Chronic back pain, Diabetes mellitus ([address] Erectile dysfunction, Gastritis, Glaucoma, H/O cardiovascular stress test, H/O echocardiogram, H/O left heart catheterization by cutdown, Hx of echocardiogram, Hyperlipidemia, Hypertension, Polyp of gallbladder, PVD (peripheral vascular disease) ([address] Teeth missing, and Wears glasses. and presents with     Plan:  1. CAD and cardiomyopathy :had covid vaccine, [facility] showed mild LAD disease and ventriculogram showed normal LVEF.,Continue aspirin,continue statins, ACEinhibitors, betablockers, repeat echo this time showed normal LVEF, MilD septal wall hypokinesis ( ECHO in 2017 had LEF 35%, however cath LVEF was normal)  2. Obesity: initially lost 20 lbs, and then gained 10 lsb, and then lost 4.5 lbs on adipex, however had urinary retention as a side effect of adipex, and hence stopped it, recommend to try KENIA over the counter, and work up and diet. recommend 1200 calories/day  3. DM: on jardiance ( pays 47 dollars co payment), continue metformin and glipizide and acotos ( acotos can cause fluid retention)  4. Dyslipidemia: continue statins  5. HTN: stable, continue lopressor and lisinopril medications  6.  CKD: stable, Health maintenance: Chloe All labs, medications and tests reviewed, continue all other medications of all above medical condition listed as is.

## 2021-05-05 ENCOUNTER — OFFICE VISIT (OUTPATIENT)
Dept: INTERNAL MEDICINE CLINIC | Age: 76
End: 2021-05-05
Payer: MEDICARE

## 2021-05-05 VITALS
DIASTOLIC BLOOD PRESSURE: 70 MMHG | HEART RATE: 67 BPM | OXYGEN SATURATION: 97 % | WEIGHT: 190 LBS | SYSTOLIC BLOOD PRESSURE: 130 MMHG | RESPIRATION RATE: 18 BRPM | BODY MASS INDEX: 32.61 KG/M2

## 2021-05-05 DIAGNOSIS — M54.41 ACUTE BILATERAL LOW BACK PAIN WITH RIGHT-SIDED SCIATICA: Primary | ICD-10-CM

## 2021-05-05 PROCEDURE — 99213 OFFICE O/P EST LOW 20 MIN: CPT | Performed by: INTERNAL MEDICINE

## 2021-05-05 PROCEDURE — 1036F TOBACCO NON-USER: CPT | Performed by: INTERNAL MEDICINE

## 2021-05-05 PROCEDURE — G8417 CALC BMI ABV UP PARAM F/U: HCPCS | Performed by: INTERNAL MEDICINE

## 2021-05-05 PROCEDURE — 3017F COLORECTAL CA SCREEN DOC REV: CPT | Performed by: INTERNAL MEDICINE

## 2021-05-05 PROCEDURE — 4040F PNEUMOC VAC/ADMIN/RCVD: CPT | Performed by: INTERNAL MEDICINE

## 2021-05-05 PROCEDURE — 1123F ACP DISCUSS/DSCN MKR DOCD: CPT | Performed by: INTERNAL MEDICINE

## 2021-05-05 PROCEDURE — G8427 DOCREV CUR MEDS BY ELIG CLIN: HCPCS | Performed by: INTERNAL MEDICINE

## 2021-05-05 RX ORDER — PREDNISONE 10 MG/1
TABLET ORAL
Qty: 20 TABLET | Refills: 0 | Status: SHIPPED | OUTPATIENT
Start: 2021-05-05 | End: 2021-06-08

## 2021-05-05 RX ORDER — TRAMADOL HYDROCHLORIDE 50 MG/1
50 TABLET ORAL EVERY 6 HOURS PRN
Qty: 12 TABLET | Refills: 0 | Status: SHIPPED | OUTPATIENT
Start: 2021-05-05 | End: 2021-05-08

## 2021-05-05 ASSESSMENT — PATIENT HEALTH QUESTIONNAIRE - PHQ9
2. FEELING DOWN, DEPRESSED OR HOPELESS: 0
SUM OF ALL RESPONSES TO PHQ QUESTIONS 1-9: 0
SUM OF ALL RESPONSES TO PHQ QUESTIONS 1-9: 0

## 2021-05-05 NOTE — PROGRESS NOTES
Tristen Workman  [birth year]  05/05/21    SUBJECTIVE:      Pt complains of pain in the low back with radiation into he right leg to the mid posterior thigh. He denies N/W, but the legs give way because of the pain. He has used zanaflex with little benefit. Pain worsened significantly after he mowed 4 acres this week. Sugars have been 110-145. Pt has been having leg cramps at night, and sometimes cramps in the hands as well. He has dysesthesias in the legs. These have been present for the last 2 weeks. OBJECTIVE:    /70   Pulse 67   Resp 18   Wt 190 lb (86.2 kg)   SpO2 97%   BMI 32.61 kg/m²     Physical Exam  Musculoskeletal:      Lumbar back: He exhibits tenderness (paraspinal muscle tenderness). He exhibits normal range of motion and no bony tenderness. Neurological:      Comments: (-) straight leg raise bilaterally         ASSESSMENT:    1. Acute bilateral low back pain with right-sided sciatica        PLAN:    Juan J Gregory was seen today for other. Diagnoses and all orders for this visit:    Acute bilateral low back pain with right-sided sciatica  Tx with a prednisone taper and ultram. If not improving he may need an EMG, lucinda with the cramping and paresthesias in his legs. -     traMADol (ULTRAM) 50 MG tablet; Take 1 tablet by mouth every 6 hours as needed for Pain for up to 3 days. Intended supply: 3 days. Take lowest dose possible to manage pain    Other orders  -     predniSONE (DELTASONE) 10 MG tablet;  Take 4 tablets daily for 2 days, then 3 tablets daily for 2 days, then two tablets daily for 2 days, then one tablet daily for 2 days

## 2021-05-10 ENCOUNTER — OFFICE VISIT (OUTPATIENT)
Dept: INTERNAL MEDICINE CLINIC | Age: 76
End: 2021-05-10
Payer: MEDICARE

## 2021-05-10 VITALS
WEIGHT: 190 LBS | BODY MASS INDEX: 32.61 KG/M2 | OXYGEN SATURATION: 98 % | HEART RATE: 73 BPM | RESPIRATION RATE: 18 BRPM | SYSTOLIC BLOOD PRESSURE: 118 MMHG | DIASTOLIC BLOOD PRESSURE: 76 MMHG

## 2021-05-10 DIAGNOSIS — M70.61 TROCHANTERIC BURSITIS OF RIGHT HIP: ICD-10-CM

## 2021-05-10 DIAGNOSIS — E11.21 TYPE 2 DIABETES MELLITUS WITH DIABETIC NEPHROPATHY, WITHOUT LONG-TERM CURRENT USE OF INSULIN (HCC): Primary | ICD-10-CM

## 2021-05-10 DIAGNOSIS — E78.2 MIXED HYPERLIPIDEMIA: ICD-10-CM

## 2021-05-10 DIAGNOSIS — E11.9 TYPE 2 DIABETES MELLITUS WITHOUT COMPLICATION, WITH LONG-TERM CURRENT USE OF INSULIN (HCC): ICD-10-CM

## 2021-05-10 DIAGNOSIS — N18.30 CHRONIC RENAL INSUFFICIENCY, STAGE 3 (MODERATE) (HCC): ICD-10-CM

## 2021-05-10 DIAGNOSIS — I10 ESSENTIAL HYPERTENSION: ICD-10-CM

## 2021-05-10 DIAGNOSIS — I73.9 PAD (PERIPHERAL ARTERY DISEASE) (HCC): ICD-10-CM

## 2021-05-10 DIAGNOSIS — Z79.4 TYPE 2 DIABETES MELLITUS WITHOUT COMPLICATION, WITH LONG-TERM CURRENT USE OF INSULIN (HCC): ICD-10-CM

## 2021-05-10 LAB
A/G RATIO: 1.7 (ref 1.1–2.2)
ALBUMIN SERPL-MCNC: 4.4 G/DL (ref 3.4–5)
ALP BLD-CCNC: 78 U/L (ref 40–129)
ALT SERPL-CCNC: 44 U/L (ref 10–40)
ANION GAP SERPL CALCULATED.3IONS-SCNC: 19 MMOL/L (ref 3–16)
AST SERPL-CCNC: 28 U/L (ref 15–37)
BASOPHILS ABSOLUTE: 0 K/UL (ref 0–0.2)
BASOPHILS RELATIVE PERCENT: 0.5 %
BILIRUB SERPL-MCNC: 0.3 MG/DL (ref 0–1)
BUN BLDV-MCNC: 47 MG/DL (ref 7–20)
CALCIUM SERPL-MCNC: 9.5 MG/DL (ref 8.3–10.6)
CHLORIDE BLD-SCNC: 102 MMOL/L (ref 99–110)
CHOLESTEROL, TOTAL: 157 MG/DL (ref 0–199)
CO2: 19 MMOL/L (ref 21–32)
CREAT SERPL-MCNC: 1.6 MG/DL (ref 0.8–1.3)
EOSINOPHILS ABSOLUTE: 0 K/UL (ref 0–0.6)
EOSINOPHILS RELATIVE PERCENT: 0.9 %
GFR AFRICAN AMERICAN: 51
GFR NON-AFRICAN AMERICAN: 42
GLOBULIN: 2.6 G/DL
GLUCOSE BLD-MCNC: 227 MG/DL (ref 70–99)
HCT VFR BLD CALC: 38.3 % (ref 40.5–52.5)
HDLC SERPL-MCNC: 38 MG/DL (ref 40–60)
HEMOGLOBIN: 12.8 G/DL (ref 13.5–17.5)
LDL CHOLESTEROL CALCULATED: 71 MG/DL
LYMPHOCYTES ABSOLUTE: 1.1 K/UL (ref 1–5.1)
LYMPHOCYTES RELATIVE PERCENT: 22.4 %
MCH RBC QN AUTO: 32.4 PG (ref 26–34)
MCHC RBC AUTO-ENTMCNC: 33.3 G/DL (ref 31–36)
MCV RBC AUTO: 97.3 FL (ref 80–100)
MONOCYTES ABSOLUTE: 0.3 K/UL (ref 0–1.3)
MONOCYTES RELATIVE PERCENT: 7 %
NEUTROPHILS ABSOLUTE: 3.4 K/UL (ref 1.7–7.7)
NEUTROPHILS RELATIVE PERCENT: 69.2 %
PDW BLD-RTO: 14.9 % (ref 12.4–15.4)
PLATELET # BLD: 139 K/UL (ref 135–450)
PLATELET SLIDE REVIEW: ADEQUATE
PMV BLD AUTO: 9.6 FL (ref 5–10.5)
POTASSIUM SERPL-SCNC: 5 MMOL/L (ref 3.5–5.1)
RBC # BLD: 3.94 M/UL (ref 4.2–5.9)
SLIDE REVIEW: ABNORMAL
SODIUM BLD-SCNC: 140 MMOL/L (ref 136–145)
TOTAL PROTEIN: 7 G/DL (ref 6.4–8.2)
TRIGL SERPL-MCNC: 239 MG/DL (ref 0–150)
VLDLC SERPL CALC-MCNC: 48 MG/DL
WBC # BLD: 4.9 K/UL (ref 4–11)

## 2021-05-10 PROCEDURE — 1036F TOBACCO NON-USER: CPT | Performed by: INTERNAL MEDICINE

## 2021-05-10 PROCEDURE — 1123F ACP DISCUSS/DSCN MKR DOCD: CPT | Performed by: INTERNAL MEDICINE

## 2021-05-10 PROCEDURE — 20610 DRAIN/INJ JOINT/BURSA W/O US: CPT | Performed by: INTERNAL MEDICINE

## 2021-05-10 PROCEDURE — G8427 DOCREV CUR MEDS BY ELIG CLIN: HCPCS | Performed by: INTERNAL MEDICINE

## 2021-05-10 PROCEDURE — 3017F COLORECTAL CA SCREEN DOC REV: CPT | Performed by: INTERNAL MEDICINE

## 2021-05-10 PROCEDURE — 4040F PNEUMOC VAC/ADMIN/RCVD: CPT | Performed by: INTERNAL MEDICINE

## 2021-05-10 PROCEDURE — 2022F DILAT RTA XM EVC RTNOPTHY: CPT | Performed by: INTERNAL MEDICINE

## 2021-05-10 PROCEDURE — 3046F HEMOGLOBIN A1C LEVEL >9.0%: CPT | Performed by: INTERNAL MEDICINE

## 2021-05-10 PROCEDURE — 99214 OFFICE O/P EST MOD 30 MIN: CPT | Performed by: INTERNAL MEDICINE

## 2021-05-10 PROCEDURE — G8417 CALC BMI ABV UP PARAM F/U: HCPCS | Performed by: INTERNAL MEDICINE

## 2021-05-10 PROCEDURE — 36415 COLL VENOUS BLD VENIPUNCTURE: CPT | Performed by: INTERNAL MEDICINE

## 2021-05-10 RX ORDER — METHYLPREDNISOLONE ACETATE 80 MG/ML
80 INJECTION, SUSPENSION INTRA-ARTICULAR; INTRALESIONAL; INTRAMUSCULAR; SOFT TISSUE ONCE
Status: COMPLETED | OUTPATIENT
Start: 2021-05-10 | End: 2021-05-10

## 2021-05-10 RX ADMIN — METHYLPREDNISOLONE ACETATE 80 MG: 80 INJECTION, SUSPENSION INTRA-ARTICULAR; INTRALESIONAL; INTRAMUSCULAR; SOFT TISSUE at 08:59

## 2021-05-10 NOTE — PROGRESS NOTES
Tristen Montesinostt  [birth year]  05/10/21    SUBJECTIVE:    LBP and radicular pain resolved with prednisone. He still has tenderness at the right lateral hip. Patient compliant with medications for diabetes. Blood sugars have averaged around , with pt checking blood sugar 2 time daily. Patient has had no episodes of significant hypoglycemia. The patient is taking hypertensive medications compliantly without side effects. Denies chest pain, dyspnea, edema, or TIA's. Blood pressure has been 130/70. Patient denies any chest pain, shortness of breath, myalgias, Patient is tolerating cholesterol medications without difficulty. He denies any leg pain with ambulation. OBJECTIVE:    /76   Pulse 73   Resp 18   Wt 190 lb (86.2 kg)   SpO2 98%   BMI 32.61 kg/m²     Physical Exam  Constitutional:       Appearance: He is well-developed. Eyes:      General: No scleral icterus. Conjunctiva/sclera: Conjunctivae normal.   Neck:      Musculoskeletal: Neck supple. Thyroid: No thyromegaly. Vascular: No JVD. Trachea: No tracheal deviation. Cardiovascular:      Rate and Rhythm: Normal rate and regular rhythm. Heart sounds: Normal heart sounds. Pulmonary:      Effort: Pulmonary effort is normal. No respiratory distress. Breath sounds: Normal breath sounds. Abdominal:      General: There is no distension. Palpations: Abdomen is soft. There is no mass. Tenderness: There is no abdominal tenderness. There is no guarding or rebound. Lymphadenopathy:      Cervical: No cervical adenopathy. Skin:     Nails: There is no clubbing. Neurological:      Mental Status: He is alert and oriented to person, place, and time. Comments: Nonfocal   Psychiatric:         Behavior: Behavior normal.         Judgment: Judgment normal.     Pain with palpation of trochanteric bursa. ASSESSMENT:    1.  Type 2 diabetes mellitus with diabetic nephropathy, without long-term current use of insulin ([facility]    2. Essential hypertension    3. Chronic renal insufficiency, stage 3 (moderate)    4. Mixed hyperlipidemia    5. PAD (peripheral artery disease) ([facility] [address]    6. Trochanteric bursitis of right hip        PLAN:    Gracie Rueda was seen today for other. Diagnoses and all orders for this visit:    Type 2 diabetes mellitus with diabetic nephropathy, without long-term current use of insulin ([facility] [address] - check labs. I may increase the jariance and stop the actos depending on readings. -     Comprehensive Metabolic Panel; Future  -     Lipid Panel; Future  -     Hemoglobin A1C; Future    Essential hypertension - at goal, check labs  -     Comprehensive Metabolic Panel; Future  -     Lipid Panel; Future  -     CBC Auto Differential; Future    Chronic renal insufficiency, stage 3 (moderate) - check labs, cont lisinopril  -     Comprehensive Metabolic Panel; Future  -     CBC Auto Differential; Future    Mixed hyperlipidemia - check labs, cont lipitor  -     Comprehensive Metabolic Panel; Future  -     Lipid Panel; Future    PAD (peripheral artery disease) (H[facility]) - no current symptoms. On statin, asa    Trochanteric bursitis of right hip - will inject today  -     20610 - IA DRAIN/INJECT LARGE JOINT/BURSA  -     methylPREDNISolone acetate (DEPO-MEDROL) injection 80 mg        -     Benefits and risks of procedure (including bleeding, infection, and tendon rupture) discussed with patient. After patient gave informed consent, area was sterilized with betadine. 1.5 inch 22 gauge syringe filled with 80mg of medrol and 0.5cc of lidocaine was injected into the bursa without complication. Patient tolerated the procedure well and had immediate improvement of symptoms after the injection. Pt declines colon cancer screening.

## 2021-05-11 ENCOUNTER — VIRTUAL VISIT (OUTPATIENT)
Dept: INTERNAL MEDICINE CLINIC | Age: 76
End: 2021-05-11
Payer: MEDICARE

## 2021-05-11 DIAGNOSIS — Z00.00 ROUTINE GENERAL MEDICAL EXAMINATION AT A HEALTH CARE FACILITY: Primary | ICD-10-CM

## 2021-05-11 LAB
ESTIMATED AVERAGE GLUCOSE: 180 MG/DL
HBA1C MFR BLD: 7.9 %

## 2021-05-11 PROCEDURE — 4040F PNEUMOC VAC/ADMIN/RCVD: CPT | Performed by: INTERNAL MEDICINE

## 2021-05-11 PROCEDURE — G0438 PPPS, INITIAL VISIT: HCPCS | Performed by: INTERNAL MEDICINE

## 2021-05-11 PROCEDURE — 3017F COLORECTAL CA SCREEN DOC REV: CPT | Performed by: INTERNAL MEDICINE

## 2021-05-11 PROCEDURE — 1123F ACP DISCUSS/DSCN MKR DOCD: CPT | Performed by: INTERNAL MEDICINE

## 2021-05-11 SDOH — ECONOMIC STABILITY: FOOD INSECURITY: WITHIN THE PAST 12 MONTHS, THE FOOD YOU BOUGHT JUST DIDN'T LAST AND YOU DIDN'T HAVE MONEY TO GET MORE.: NEVER TRUE

## 2021-05-11 ASSESSMENT — PATIENT HEALTH QUESTIONNAIRE - PHQ9
2. FEELING DOWN, DEPRESSED OR HOPELESS: 0
SUM OF ALL RESPONSES TO PHQ QUESTIONS 1-9: 0

## 2021-05-11 NOTE — PROGRESS NOTES
Medicare Annual Wellness Visit  Name: Yogesh Wood Date: 2021   MRN: [id number] Sex: Male   Age: 78 y.o. Ethnicity: Non-/Non    : 1945 Race: White      Tristen Workman is here for Medicare AWV    Screenings for behavioral, psychosocial and functional/safety risks, and cognitive dysfunction are all negative except as indicated below. These results, as well as other patient data from the [address] form, are documented in Flowsheets linked to this Encounter. Allergies   Allergen Reactions    Penicillins Hives    Sulfa Antibiotics Hives       Prior to Visit Medications    Medication Sig Taking?  Authorizing Provider   predniSONE (DELTASONE) 10 MG tablet Take 4 tablets daily for 2 days, then 3 tablets daily for 2 days, then two tablets daily for 2 days, then one tablet daily for 2 days Yes Yue Seay MD   atorvastatin (LIPITOR) 80 MG tablet TAKE 1 TABLET BY MOUTH AT BEDTIME Yes Yue Seay MD   metoprolol tartrate (LOPRESSOR) 50 MG tablet Take 1 tablet by mouth 2 times daily Yes Yue Seay MD   furosemide (LASIX) 20 MG tablet TAKE 1 TABLET BY MOUTH EVERY OTHER DAY Yes Yue Seay MD   glipiZIDE (GLUCOTROL) 10 MG tablet Take 1 tablet by mouth 2 times daily Yes Yue Seay MD   metFORMIN (GLUCOPHAGE) 500 MG tablet Take 2 tablets by mouth 2 times daily (with meals) Yes Yue Seay MD   empagliflozin (JARDIANCE) 10 MG tablet Take 1 tablet by mouth daily Yes Yue Seay MD   lisinopril (PRINIVIL;ZESTRIL) 20 MG tablet Take 1 tablet by mouth daily Yes Yue Seay MD   pioglitazone (ACTOS) 15 MG tablet Take 1 tablet by mouth daily Yes Yue Seay MD   timolol (TIMOPTIC) 0.5 % ophthalmic solution INSTILL 1 DROP INTO EACH EYE IN THE MORNING Yes Historical Provider, MD   SOFT TOUCH LANCETS MISC Test three times daily or prn for abnormal glucose Yes Kellen Arellano MD Tatiana   Blood Glucose Monitoring Suppl (ACCU-CHEK ARYAN PLUS) w/Device KIT Test three times daily or prn for abnormal glucose Yes Madeline Cardenas MD   fluticasone (FLONASE) 50 MCG/ACT nasal spray USE ONE SPRAY(S) IN EACH NOSTRIL ONCE DAILY  Patient taking differently: as needed  Yes Madeline Cardenas MD   aspirin 325 MG EC tablet Take 325 mg by mouth nightly Over The Counter Yes Historical Provider, MD   blood glucose test strips (ACCU-CHEK ARYAN PLUS) strip 1 each by In Vitro route 2 times daily Or as needed. Madeline Cardenas MD   tiZANidine (ZANAFLEX) 2 MG tablet Take 1-2 tablets by mouth 4 times daily as needed (back pain)  Patient not taking: Reported on 5/11/2021  Madeline Cardenas MD   latanoprost (XALATAN) 0.005 % ophthalmic solution Place 1 drop into both eyes nightly  Patient not taking: Reported on 5/11/2021  Madeline Cardenas MD       Past Medical History:   Diagnosis Date    CAD (coronary artery disease)     Sees Dr. Olya Lott Cerebral artery occlusion with cerebral infarction [facility]) Dx 1990's    \"TIA's\" No Residual     Chronic back pain     Diabetes mellitus ([facility] [address] [address] Dx 2002    Erectile dysfunction     Gastritis     4/13 EGD - mild gastritis    Glaucoma     Bilateral Eyes    H/O cardiovascular stress test 05/25/2012    2012, Ragini, EF 54 %, Normal pattern of perfusion in all regions.  H/O echocardiogram 2009,2005 2009, EF 45-50%, Trace MR & TR    H/O left heart catheterization by cutdown 08/2015    Hx of echocardiogram 02/10/2021    EF 50-55%. No significant valvular disease noted.  Hyperlipidemia     5/12 Stress WNL, EF 54%; 8/11 - Stress Test WNL, EF 58%    Hypertension     Polyp of gallbladder     PVD (peripheral vascular disease) ([facility] [address] [address] 08/10/2016    [facility] sent copy of Bilat Lower Arterial US, scanned into Epic. Mild PVD .     Teeth missing     Upper And Lower    Wears glasses     To Read       Past Surgical History:   Procedure Laterality Date    CARDIAC CATHETERIZATION  Last Done 01/18/2017    CARPAL TUNNEL RELEASE Bilateral 1979    Done At Separate Times    CHOLECYSTECTOMY, LAPAROSCOPIC N/A 08/08/2017    COLONOSCOPY  10/2007    adenoma xl Sara    COLONOSCOPY  1/27/16    polyp x2    DENTAL SURGERY      Teeth Extracted In Past    ENDOSCOPY, COLON, DIAGNOSTIC  4-9-13    gastritis    EYE SURGERY Bilateral 1990's    Cataracts With Lens Implants    KNEE SURGERY Left 2012    TOENAIL EXCISION Right 1997    Great Toe    VASECTOMY  1987       Family History   Problem Relation Age of Onset    Early Death Mother 47    Heart Disease Mother     Diabetes Father     Heart Attack Sister     Heart Disease Sister         Heart Attack    Other Sister         Polio    Early Death Brother 52        Heart Attack    Heart Attack Brother     Heart Disease Brother         Heart Attack    Diabetes Brother     Heart Attack Brother     Heart Disease Brother         Heart Attack    Diabetes Sister     Depression Sister        CareTeam (Including outside providers/suppliers regularly involved in providing care):   Patient Care Team:  Elvia Berman MD as PCP - General (Internal Medicine)  Elvia Berman MD as PCP - [facility] Empaneled Provider  Usama Dumont MD as Consulting Physician (Cardiology)    Wt Readings from Last 3 Encounters:   05/10/21 190 lb (86.2 kg)   05/05/21 190 lb (86.2 kg)   03/03/21 187 lb (84.8 kg)     There were no vitals filed for this visit. There is no height or weight on file to calculate BMI. Based upon direct observation of the patient, evaluation of cognition reveals recent and remote memory intact. Patient's complete Health Risk Assessment and screening values have been reviewed and are found in Flowsheets. The following problems were reviewed today and where indicated follow up appointments were made and/or referrals ordered.     Positive Risk Factor Screenings with Interventions: General Health and ACP:  General  In general, how would you say your health is?: Very Good  In the past 7 days, have you experienced any of the following? New or Increased Pain, New or Increased Fatigue, Loneliness, Social Isolation, Stress or Anger?: None of These  Do you get the social and emotional support that you need?: Yes  Do you have a Living Will?: (!) No  Advance Directives     Power of [address] Will ACP-Advance Directive ACP-Power of     Not on File Not on File Not on File Not on File      General Health Risk Interventions:  · No Living Will: Advance Care Planning addressed with patient today-patient asked if he could request forms at his next in office visit. Health Habits/Nutrition:  Health Habits/Nutrition  Do you exercise for at least 20 minutes 2-3 times per week?: (!) No  Have you lost any weight without trying in the past 3 months?: No  Do you eat only one meal per day?: No  Have you seen the dentist within the past year?: (!) No     Health Habits/Nutrition Interventions:  · Inadequate physical activity:  patient agrees to join a structured exercise program- Patient states he used to work out at [employer], but hasn't been able to for the past 6 months due to COVID-pt states he plans to begin this once the virus is gone.   · Dental exam overdue:  patient encouraged to make appointment with his/her dentist       Personalized Preventive Plan   Current Health Maintenance Status  Immunization History   Administered Date(s) Administered    Influenza 11/01/2012    Influenza Virus Vaccine 11/15/2010    Influenza, High Dose (Fluzone 65 yrs and older) 09/16/2015, 11/18/2016, 09/11/2017, 10/15/2018, 09/17/2019    Pneumococcal Conjugate 13-valent ([id number]) 09/16/2015    Pneumococcal Polysaccharide ([id number]) 11/15/2010, 12/05/2014    Td, unspecified formulation 09/01/2008    Tdap (Boostrix, Adacel) 08/09/2016    Zoster Live (Zostavax) 09/01/2012    Zoster Recombinant (Shingrix) 12/13/2018, 03/21/2019        Health Maintenance   Topic Date Due    Diabetic retinal exam  12/19/2018    Annual Wellness Visit (AWV)  Never done    Colon cancer screen colonoscopy  01/27/2021    Diabetic foot exam  11/10/2021    A1C test (Diabetic or Prediabetic)  05/10/2022    Lipid screen  05/10/2022    Potassium monitoring  05/10/2022    Creatinine monitoring  05/10/2022    DTaP/Tdap/Td vaccine (2 - Td) 08/09/2026    Flu vaccine  Completed    Shingles Vaccine  Completed    Pneumococcal 65+ years Vaccine  Completed    COVID-19 Vaccine  Completed    AAA screen  Completed    Hepatitis C screen  Completed    Hepatitis A vaccine  Aged Out    Hib vaccine  Aged Out    Meningococcal (ACWY) vaccine  Aged Out     Recommendations for THREAT STREAM Due: see orders and patient instructions/AVS. Patient states he had a diabetic retinal exam about 6 months ago. LPN to call Dr. Tania Olson office and request notes. Unable to obtain 3 vital signs due to patient not having equipment to take blood pressure/temperature. Recommended screening schedule for the next 5-10 years is provided to the patient in written form: see Patient Instructions/AVS. Discussed need for colonoscopy or cologuard, which patient states he is thinking about doing. Blessing Manuel LPN, [birth year], performed the documented evaluation under the direct supervision of the attending physician. Neto Gonzalez, was evaluated through a synchronous (real-time) audio-video encounter. The patient (or guardian if applicable) is aware that this is a billable service. Verbal consent to proceed has been obtained within the past 12 months. The visit was conducted pursuant to the emergency declaration under the [facility], [address] [place] [address] authority and the [employer] Act.   Patient identification was verified, and a caregiver was present when appropriate. The patient was located in the state of [place], where the provider was credentialed to provide care. Total time spent for this encounter: Not billed by time    --Alphonso Ferguson LPN on 9/49/2205 at 54:04 AM    An electronic signature was used to authenticate this note. This encounter was performed under Luca ulcas MDs, direct supervision, 5/11/2021.

## 2021-05-11 NOTE — PATIENT INSTRUCTIONS
Personalized Preventive Plan for Josseline Workman - 5/11/2021  Medicare offers a range of preventive health benefits. Some of the tests and screenings are paid in full while other may be subject to a deductible, co-insurance, and/or copay. Some of these benefits include a comprehensive review of your medical history including lifestyle, illnesses that may run in your family, and various assessments and screenings as appropriate. After reviewing your medical record and screening and assessments performed today your provider may have ordered immunizations, labs, imaging, and/or referrals for you. A list of these orders (if applicable) as well as your Preventive Care list are included within your After Visit Summary for your review. Other Preventive Recommendations:    · A preventive eye exam performed by an eye specialist is recommended every 1-2 years to screen for glaucoma; cataracts, macular degeneration, and other eye disorders. · A preventive dental visit is recommended every 6 months. · Try to get at least 150 minutes of exercise per week or 10,000 steps per day on a pedometer . · Order or download the FREE \"Exercise & Physical Activity: Your Everyday Guide\" from [employer]. Call 6-907.252.1920 or search [employer] online. · You need 6158-7417 mg of calcium and 2077-9670 IU of vitamin D per day. It is possible to meet your calcium requirement with diet alone, but a vitamin D supplement is usually necessary to meet this goal.  · When exposed to the sun, use a sunscreen that protects against both UVA and UVB radiation with an SPF of 30 or greater. Reapply every 2 to 3 hours or after sweating, drying off with a towel, or swimming. · Always wear a seat belt when traveling in a car. Always wear a helmet when riding a bicycle or motorcycle.

## 2021-05-20 ENCOUNTER — TELEPHONE (OUTPATIENT)
Dept: INTERNAL MEDICINE CLINIC | Age: 76
End: 2021-05-20

## 2021-06-07 RX ORDER — PIOGLITAZONEHYDROCHLORIDE 15 MG/1
15 TABLET ORAL DAILY
Qty: 90 TABLET | Refills: 3 | Status: SHIPPED | OUTPATIENT
Start: 2021-06-07 | End: 2022-05-11

## 2021-06-07 RX ORDER — EMPAGLIFLOZIN 10 MG/1
1 TABLET, FILM COATED ORAL DAILY
Qty: 30 TABLET | Refills: 5 | Status: SHIPPED | OUTPATIENT
Start: 2021-06-07 | End: 2022-01-25 | Stop reason: SDUPTHER

## 2021-06-08 ENCOUNTER — OFFICE VISIT (OUTPATIENT)
Dept: INTERNAL MEDICINE CLINIC | Age: 76
End: 2021-06-08
Payer: MEDICARE

## 2021-06-08 VITALS
OXYGEN SATURATION: 97 % | RESPIRATION RATE: 18 BRPM | BODY MASS INDEX: 31.58 KG/M2 | WEIGHT: 184 LBS | HEART RATE: 61 BPM | DIASTOLIC BLOOD PRESSURE: 72 MMHG | SYSTOLIC BLOOD PRESSURE: 116 MMHG

## 2021-06-08 DIAGNOSIS — I10 ESSENTIAL HYPERTENSION: ICD-10-CM

## 2021-06-08 DIAGNOSIS — M54.16 LUMBAR RADICULOPATHY: Primary | ICD-10-CM

## 2021-06-08 DIAGNOSIS — M79.604 PAIN OF RIGHT LOWER EXTREMITY: ICD-10-CM

## 2021-06-08 PROCEDURE — 3017F COLORECTAL CA SCREEN DOC REV: CPT | Performed by: INTERNAL MEDICINE

## 2021-06-08 PROCEDURE — G8427 DOCREV CUR MEDS BY ELIG CLIN: HCPCS | Performed by: INTERNAL MEDICINE

## 2021-06-08 PROCEDURE — 1036F TOBACCO NON-USER: CPT | Performed by: INTERNAL MEDICINE

## 2021-06-08 PROCEDURE — 1123F ACP DISCUSS/DSCN MKR DOCD: CPT | Performed by: INTERNAL MEDICINE

## 2021-06-08 PROCEDURE — G8417 CALC BMI ABV UP PARAM F/U: HCPCS | Performed by: INTERNAL MEDICINE

## 2021-06-08 PROCEDURE — 99213 OFFICE O/P EST LOW 20 MIN: CPT | Performed by: INTERNAL MEDICINE

## 2021-06-08 PROCEDURE — 4040F PNEUMOC VAC/ADMIN/RCVD: CPT | Performed by: INTERNAL MEDICINE

## 2021-06-08 RX ORDER — METHYLPREDNISOLONE ACETATE 80 MG/ML
80 INJECTION, SUSPENSION INTRA-ARTICULAR; INTRALESIONAL; INTRAMUSCULAR; SOFT TISSUE ONCE
Status: COMPLETED | OUTPATIENT
Start: 2021-06-08 | End: 2021-06-08

## 2021-06-08 RX ORDER — METOPROLOL TARTRATE 50 MG/1
50 TABLET, FILM COATED ORAL 2 TIMES DAILY
Qty: 180 TABLET | Refills: 3 | Status: SHIPPED | OUTPATIENT
Start: 2021-06-08 | End: 2022-05-23

## 2021-06-08 RX ADMIN — METHYLPREDNISOLONE ACETATE 80 MG: 80 INJECTION, SUSPENSION INTRA-ARTICULAR; INTRALESIONAL; INTRAMUSCULAR; SOFT TISSUE at 11:48

## 2021-06-08 NOTE — PROGRESS NOTES
Tristen Workman  [birth year]  06/08/21    SUBJECTIVE:    3 watson ago pt rolled over and he immediately had pain shoting down the right leg. Since then he has had abnormal sensation in the lower legs bilat which he describes as worms crawling and he will have fasiculations in the calves bilat;  cramps on the right > lft. He severe pain is in right posterior thigh to the knee. Symptoms are worse with standing, walking. He denies any back pain. He has used tylenol with modest benefit; ice with some benefit. Pt has lost 6 pounds and sugars have been 90 since starting jardiance    OBJECTIVE:    /72   Pulse 61   Resp 18   Wt 184 lb (83.5 kg)   SpO2 97%   BMI 31.58 kg/m²     Physical Exam  strength 5/5 lower extremities; (+) straight leg raise on the right. ASSESSMENT:    1. Lumbar radiculopathy    2. Pain of right lower extremity        PLAN:    Reuben Jordan was seen today for leg pain. Diagnoses and all orders for this visit:    Lumbar radiculopathy - Will give a shot of medrol. If no benefit pt will call in 2 days and I will start lyrica 25mg TID. If pain is not improving will need to check an EMG and consider referral to pain mgmt.      Pain of right lower extremity  -     methylPREDNISolone acetate (DEPO-MEDROL) injection 80 mg

## 2021-06-10 ENCOUNTER — TELEPHONE (OUTPATIENT)
Dept: INTERNAL MEDICINE CLINIC | Age: 76
End: 2021-06-10

## 2021-06-10 DIAGNOSIS — M54.16 LUMBAR RADICULOPATHY: Primary | ICD-10-CM

## 2021-06-10 RX ORDER — PREGABALIN 25 MG/1
25 CAPSULE ORAL 3 TIMES DAILY
Qty: 90 CAPSULE | Refills: 1 | Status: SHIPPED | OUTPATIENT
Start: 2021-06-10 | End: 2021-06-22

## 2021-06-21 ENCOUNTER — TELEPHONE (OUTPATIENT)
Dept: INTERNAL MEDICINE CLINIC | Age: 76
End: 2021-06-21

## 2021-06-21 NOTE — TELEPHONE ENCOUNTER
Pt called in stating that he started the pregablin for the sciatica nerve pain and it caused him to break out in hives .  Please advise !!

## 2021-06-22 RX ORDER — DIPHENHYDRAMINE HCL 25 MG
25 CAPSULE ORAL EVERY 6 HOURS PRN
Qty: 60 CAPSULE | Refills: 0 | Status: SHIPPED | OUTPATIENT
Start: 2021-06-22 | End: 2021-07-22

## 2021-06-22 NOTE — TELEPHONE ENCOUNTER
Stop the pregabalin, gabe as allergy in the chart    Start benadryl 25mg every 6 hours as needed until hives resolve

## 2021-07-27 ENCOUNTER — OFFICE VISIT (OUTPATIENT)
Dept: INTERNAL MEDICINE CLINIC | Age: 76
End: 2021-07-27
Payer: MEDICARE

## 2021-07-27 VITALS
WEIGHT: 187 LBS | RESPIRATION RATE: 18 BRPM | HEART RATE: 77 BPM | BODY MASS INDEX: 32.1 KG/M2 | OXYGEN SATURATION: 99 % | SYSTOLIC BLOOD PRESSURE: 140 MMHG | DIASTOLIC BLOOD PRESSURE: 60 MMHG

## 2021-07-27 DIAGNOSIS — M79.605 PAIN IN BOTH LOWER EXTREMITIES: Primary | ICD-10-CM

## 2021-07-27 DIAGNOSIS — M79.604 PAIN IN BOTH LOWER EXTREMITIES: Primary | ICD-10-CM

## 2021-07-27 PROCEDURE — 99213 OFFICE O/P EST LOW 20 MIN: CPT | Performed by: INTERNAL MEDICINE

## 2021-07-27 PROCEDURE — G8427 DOCREV CUR MEDS BY ELIG CLIN: HCPCS | Performed by: INTERNAL MEDICINE

## 2021-07-27 PROCEDURE — 1036F TOBACCO NON-USER: CPT | Performed by: INTERNAL MEDICINE

## 2021-07-27 PROCEDURE — 1123F ACP DISCUSS/DSCN MKR DOCD: CPT | Performed by: INTERNAL MEDICINE

## 2021-07-27 PROCEDURE — 3017F COLORECTAL CA SCREEN DOC REV: CPT | Performed by: INTERNAL MEDICINE

## 2021-07-27 PROCEDURE — 4040F PNEUMOC VAC/ADMIN/RCVD: CPT | Performed by: INTERNAL MEDICINE

## 2021-07-27 PROCEDURE — G8417 CALC BMI ABV UP PARAM F/U: HCPCS | Performed by: INTERNAL MEDICINE

## 2021-07-27 NOTE — PROGRESS NOTES
Tristen BENTON Ji  [birth year]  07/27/21    SUBJECTIVE:    6 weeks ago ago pt rolled over and he immediately had pain shoting down the right leg. Since then he has had abnormal sensation in the lower legs bilat which he describes as worms crawling and he will have fasiculations in the calves bilat;  cramps on the right > lft. He severe pain is in right posterior thigh to the knee. Symptoms are worse with standing, walking. He denies any back pain. He was started on lyrica but developed hives. Saturday pt had a change in the pain, with radiation now in the bilat posterior legs to the knees. This worsens with standing. He does have areas or tenderness in the bilat buttocks. OBJECTIVE:    BP (!) 140/60   Pulse 77   Resp 18   Wt 187 lb (84.8 kg)   SpO2 99%   BMI 32.10 kg/m²     Physical Exam  Musculoskeletal:      Lumbar back: Tenderness (paraspinal muscle tenderness) present. No bony tenderness. Normal range of motion. Neurological:      Comments: (+) straight leg raise bilaterally     pain with palpation of R>L SI joints    ASSESSMENT:    1. Pain in both lower extremities        PLAN:    Umu Braga was seen today for back pain. Diagnoses and all orders for this visit:    Pain in both lower extremities - seems to be radicular, but he may also have SI joint pain. WIll start eval with EMG, depending on results may need xray of the SI joints and l-spine.  I will ask Grisel Burton to perform EMG of the bilat legs  -     Grant Medel MD, Physical Medicine, [facility]

## 2021-08-02 ENCOUNTER — TELEPHONE (OUTPATIENT)
Dept: INTERNAL MEDICINE CLINIC | Age: 76
End: 2021-08-02

## 2021-08-02 NOTE — TELEPHONE ENCOUNTER
Pt called in stating that he was paying $40 out of pocket for insurance and now they are wanting $140 he would like to know is there any alternative he may try ?

## 2021-08-10 ENCOUNTER — OFFICE VISIT (OUTPATIENT)
Dept: INTERNAL MEDICINE CLINIC | Age: 76
End: 2021-08-10
Payer: MEDICARE

## 2021-08-10 VITALS
RESPIRATION RATE: 16 BRPM | OXYGEN SATURATION: 97 % | BODY MASS INDEX: 32.2 KG/M2 | SYSTOLIC BLOOD PRESSURE: 138 MMHG | DIASTOLIC BLOOD PRESSURE: 60 MMHG | WEIGHT: 187.6 LBS | HEART RATE: 68 BPM

## 2021-08-10 DIAGNOSIS — Z79.4 TYPE 2 DIABETES MELLITUS WITHOUT COMPLICATION, WITH LONG-TERM CURRENT USE OF INSULIN (HCC): Primary | ICD-10-CM

## 2021-08-10 DIAGNOSIS — E11.21 TYPE 2 DIABETES MELLITUS WITH DIABETIC NEPHROPATHY, WITHOUT LONG-TERM CURRENT USE OF INSULIN (HCC): ICD-10-CM

## 2021-08-10 DIAGNOSIS — N18.30 CHRONIC RENAL INSUFFICIENCY, STAGE 3 (MODERATE) (HCC): ICD-10-CM

## 2021-08-10 DIAGNOSIS — E78.2 MIXED HYPERLIPIDEMIA: ICD-10-CM

## 2021-08-10 DIAGNOSIS — E11.9 TYPE 2 DIABETES MELLITUS WITHOUT COMPLICATION, WITH LONG-TERM CURRENT USE OF INSULIN (HCC): Primary | ICD-10-CM

## 2021-08-10 DIAGNOSIS — I10 ESSENTIAL HYPERTENSION: ICD-10-CM

## 2021-08-10 PROCEDURE — G8417 CALC BMI ABV UP PARAM F/U: HCPCS | Performed by: INTERNAL MEDICINE

## 2021-08-10 PROCEDURE — 2022F DILAT RTA XM EVC RTNOPTHY: CPT | Performed by: INTERNAL MEDICINE

## 2021-08-10 PROCEDURE — 4040F PNEUMOC VAC/ADMIN/RCVD: CPT | Performed by: INTERNAL MEDICINE

## 2021-08-10 PROCEDURE — 1123F ACP DISCUSS/DSCN MKR DOCD: CPT | Performed by: INTERNAL MEDICINE

## 2021-08-10 PROCEDURE — 3017F COLORECTAL CA SCREEN DOC REV: CPT | Performed by: INTERNAL MEDICINE

## 2021-08-10 PROCEDURE — G8427 DOCREV CUR MEDS BY ELIG CLIN: HCPCS | Performed by: INTERNAL MEDICINE

## 2021-08-10 PROCEDURE — 3051F HG A1C>EQUAL 7.0%<8.0%: CPT | Performed by: INTERNAL MEDICINE

## 2021-08-10 PROCEDURE — 99214 OFFICE O/P EST MOD 30 MIN: CPT | Performed by: INTERNAL MEDICINE

## 2021-08-10 PROCEDURE — 1036F TOBACCO NON-USER: CPT | Performed by: INTERNAL MEDICINE

## 2021-08-10 NOTE — PROGRESS NOTES
Shad Goode  [birth year]  08/10/21    SUBJECTIVE:      Patient compliant with medications for diabetes. Blood sugars have averaged around , with pt checking blood sugar 2 time daily. Patient has had no episodes of significant hypoglycemia. Lordemetrius  is expensive for the pt, and he will no longer be able to afford the med. He continues on actos, metformin, glipizide. He refuses to go back on insulin. Patient denies any chest pain, shortness of breath, myalgias, Patient is tolerating cholesterol medications without difficulty. The patient is taking hypertensive medications compliantly without side effects. Denies chest pain, dyspnea, edema, or TIA's. Blood pressure has been 126/77. OBJECTIVE:    /60   Pulse 68   Resp 16   Wt 187 lb 9.6 oz (85.1 kg)   SpO2 97%   BMI 32.20 kg/m²     Physical Exam  Constitutional:       Appearance: He is well-developed. Eyes:      General: No scleral icterus. Conjunctiva/sclera: Conjunctivae normal.   Neck:      Thyroid: No thyromegaly. Vascular: No JVD. Trachea: No tracheal deviation. Cardiovascular:      Rate and Rhythm: Normal rate and regular rhythm. Heart sounds: Normal heart sounds. Pulmonary:      Effort: Pulmonary effort is normal. No respiratory distress. Breath sounds: Normal breath sounds. Abdominal:      General: There is no distension. Palpations: Abdomen is soft. There is no mass. Tenderness: There is no abdominal tenderness. There is no guarding or rebound. Musculoskeletal:      Cervical back: Neck supple. Lymphadenopathy:      Cervical: No cervical adenopathy. Skin:     Nails: There is no clubbing. Neurological:      Mental Status: He is alert and oriented to person, place, and time. Comments: Nonfocal   Psychiatric:         Behavior: Behavior normal.         Judgment: Judgment normal.         ASSESSMENT:    1.  Type 2 diabetes mellitus without complication, with long-term current use of insulin ([facility]    2. Type 2 diabetes mellitus with diabetic nephropathy, without long-term current use of insulin ([facility] [facility]    3. Mixed hyperlipidemia    4. Essential hypertension    5. Chronic renal insufficiency, stage 3 (moderate) (HCC)        PLAN:    Hadley De Jesus was seen today for 3 month follow-up and discuss medications. Diagnoses and all orders for this visit:    Type 2 diabetes mellitus without complication, with long-term current use of insulin ([facility] [facility] - check labs; pt cannot afford jardiance and refuses insulin, also refuses dm education. We are limited in other options, and we will need to expect that his a1c will increase and he will be at increased risk for progressio of CRI, CAD, PAD. Pt understands    Type 2 diabetes mellitus with diabetic nephropathy, without long-term current use of insulin (HCC)  -     Comprehensive Metabolic Panel; Future  -     Hemoglobin A1C; Future    Mixed hyperlipidemia - check labs, cont statin    Essential hypertension - at goal, check labs  -     Comprehensive Metabolic Panel; Future    Chronic renal insufficiency, stage 3 (moderate) (HCC) - check labs; on lisinopril  -     Comprehensive Metabolic Panel; Future        Pt refuses further colon cancer screening.

## 2021-08-11 DIAGNOSIS — N18.30 CHRONIC RENAL INSUFFICIENCY, STAGE 3 (MODERATE) (HCC): ICD-10-CM

## 2021-08-11 DIAGNOSIS — E11.21 TYPE 2 DIABETES MELLITUS WITH DIABETIC NEPHROPATHY, WITHOUT LONG-TERM CURRENT USE OF INSULIN (HCC): ICD-10-CM

## 2021-08-11 DIAGNOSIS — I10 ESSENTIAL HYPERTENSION: ICD-10-CM

## 2021-08-11 LAB
A/G RATIO: 1.8 (ref 1.1–2.2)
ALBUMIN SERPL-MCNC: 4.4 G/DL (ref 3.4–5)
ALP BLD-CCNC: 77 U/L (ref 40–129)
ALT SERPL-CCNC: 18 U/L (ref 10–40)
ANION GAP SERPL CALCULATED.3IONS-SCNC: 16 MMOL/L (ref 3–16)
AST SERPL-CCNC: 16 U/L (ref 15–37)
BILIRUB SERPL-MCNC: 0.4 MG/DL (ref 0–1)
BUN BLDV-MCNC: 37 MG/DL (ref 7–20)
CALCIUM SERPL-MCNC: 9.4 MG/DL (ref 8.3–10.6)
CHLORIDE BLD-SCNC: 106 MMOL/L (ref 99–110)
CO2: 21 MMOL/L (ref 21–32)
CREAT SERPL-MCNC: 1.7 MG/DL (ref 0.8–1.3)
GFR AFRICAN AMERICAN: 48
GFR NON-AFRICAN AMERICAN: 40
GLOBULIN: 2.4 G/DL
GLUCOSE BLD-MCNC: 122 MG/DL (ref 70–99)
POTASSIUM SERPL-SCNC: 4.7 MMOL/L (ref 3.5–5.1)
SODIUM BLD-SCNC: 143 MMOL/L (ref 136–145)
TOTAL PROTEIN: 6.8 G/DL (ref 6.4–8.2)

## 2021-08-11 PROCEDURE — 36415 COLL VENOUS BLD VENIPUNCTURE: CPT | Performed by: INTERNAL MEDICINE

## 2021-08-12 LAB
ESTIMATED AVERAGE GLUCOSE: 171.4 MG/DL
HBA1C MFR BLD: 7.6 %

## 2021-08-16 ENCOUNTER — TELEPHONE (OUTPATIENT)
Dept: INTERNAL MEDICINE CLINIC | Age: 76
End: 2021-08-16

## 2021-09-27 RX ORDER — LISINOPRIL 20 MG/1
TABLET ORAL
Qty: 90 TABLET | Refills: 0 | Status: SHIPPED | OUTPATIENT
Start: 2021-09-27 | End: 2021-09-29 | Stop reason: SDUPTHER

## 2021-09-27 RX ORDER — LISINOPRIL 20 MG/1
TABLET ORAL
Qty: 90 TABLET | Refills: 0 | Status: SHIPPED | OUTPATIENT
Start: 2021-09-27 | End: 2021-12-13

## 2021-09-29 ENCOUNTER — OFFICE VISIT (OUTPATIENT)
Dept: CARDIOLOGY CLINIC | Age: 76
End: 2021-09-29
Payer: MEDICARE

## 2021-09-29 ENCOUNTER — TELEPHONE (OUTPATIENT)
Dept: CARDIOLOGY CLINIC | Age: 76
End: 2021-09-29

## 2021-09-29 VITALS
BODY MASS INDEX: 30.22 KG/M2 | WEIGHT: 188 LBS | HEIGHT: 66 IN | DIASTOLIC BLOOD PRESSURE: 74 MMHG | HEART RATE: 68 BPM | SYSTOLIC BLOOD PRESSURE: 140 MMHG

## 2021-09-29 DIAGNOSIS — I25.10 CORONARY ARTERY DISEASE INVOLVING NATIVE CORONARY ARTERY OF NATIVE HEART WITHOUT ANGINA PECTORIS: Primary | ICD-10-CM

## 2021-09-29 PROCEDURE — 99214 OFFICE O/P EST MOD 30 MIN: CPT | Performed by: INTERNAL MEDICINE

## 2021-09-29 PROCEDURE — G8417 CALC BMI ABV UP PARAM F/U: HCPCS | Performed by: INTERNAL MEDICINE

## 2021-09-29 PROCEDURE — G8427 DOCREV CUR MEDS BY ELIG CLIN: HCPCS | Performed by: INTERNAL MEDICINE

## 2021-09-29 PROCEDURE — 1036F TOBACCO NON-USER: CPT | Performed by: INTERNAL MEDICINE

## 2021-09-29 PROCEDURE — 1123F ACP DISCUSS/DSCN MKR DOCD: CPT | Performed by: INTERNAL MEDICINE

## 2021-09-29 PROCEDURE — 3017F COLORECTAL CA SCREEN DOC REV: CPT | Performed by: INTERNAL MEDICINE

## 2021-09-29 PROCEDURE — 4040F PNEUMOC VAC/ADMIN/RCVD: CPT | Performed by: INTERNAL MEDICINE

## 2021-09-29 NOTE — PROGRESS NOTES
ophthalmic solution INSTILL 1 DROP INTO EACH EYE IN THE MORNING      SOFT TOUCH LANCETS MISC Test three times daily or prn for abnormal glucose 100 each 3    Blood Glucose Monitoring Suppl (ACCU-CHEK ARYAN PLUS) w/Device KIT Test three times daily or prn for abnormal glucose 1 kit 0    fluticasone (FLONASE) 50 MCG/ACT nasal spray USE ONE SPRAY(S) IN EACH NOSTRIL ONCE DAILY (Patient taking differently: as needed ) 1 Bottle 11    latanoprost (XALATAN) 0.005 % ophthalmic solution Place 1 drop into both eyes nightly 1 Bottle 6    aspirin 325 MG EC tablet Take 325 mg by mouth nightly Over The Counter      blood glucose test strips (ACCU-CHEK ARYAN PLUS) strip 1 each by In Vitro route 2 times daily Or as needed. 300 each 3     No current facility-administered medications for this visit. Allergies: Penicillins, Sulfa antibiotics, and Lyrica [pregabalin]  Past Medical History:   Diagnosis Date    CAD (coronary artery disease)     Sees Dr. Pascale Bright Cerebral artery occlusion with cerebral infarction [facility]) Dx 1990's    \"TIA's\" No Residual     Chronic back pain     Diabetes mellitus ([facility] [address] Dx 2002    Erectile dysfunction     Gastritis     4/13 EGD - mild gastritis    Glaucoma     Bilateral Eyes    H/O cardiovascular stress test 05/25/2012 2012, Ragini, EF 54 %, Normal pattern of perfusion in all regions.  H/O echocardiogram 2009,2005 2009, EF 45-50%, Trace MR & TR    H/O left heart catheterization by cutdown 08/2015    Hx of echocardiogram 02/10/2021    EF 50-55%. No significant valvular disease noted.  Hyperlipidemia     5/12 Stress WNL, EF 54%; 8/11 - Stress Test WNL, EF 58%    Hypertension     Polyp of gallbladder     PVD (peripheral vascular disease) ([facility] [address] 08/10/2016    [facility] sent copy of Bilat Lower Arterial US, scanned into Epic. Mild PVD .     Teeth missing     Upper And Lower    Wears glasses     To Read     Past Surgical History:   Procedure Laterality Date    CARDIAC CATHETERIZATION  Last Done 2017    CARPAL TUNNEL RELEASE Bilateral 1979    Done At Separate Times    CHOLECYSTECTOMY, LAPAROSCOPIC N/A 2017    COLONOSCOPY  10/2007    adenoma xl Sara    COLONOSCOPY  16    polyp x2    DENTAL SURGERY      Teeth Extracted In Past    ENDOSCOPY, COLON, DIAGNOSTIC  13    gastritis    EYE SURGERY Bilateral     Cataracts With Lens Implants    KNEE SURGERY Left     TOENAIL EXCISION Right     Great Toe    VASECTOMY  1987     Family History   Problem Relation Age of Onset    Early Death Mother 47    Heart Disease Mother     Diabetes Father     Heart Attack Sister     Heart Disease Sister         Heart Attack    Other Sister         Polio    Early Death Brother 52        Heart Attack    Heart Attack Brother     Heart Disease Brother         Heart Attack    Diabetes Brother     Heart Attack Brother     Heart Disease Brother         Heart Attack    Diabetes Sister     Depression Sister      Social History     Tobacco Use    Smoking status: Former Smoker     Packs/day: 0.50     Years: 22.00     Pack years: 11.00     Types: Pipe, Cigars, Cigarettes     Start date:      Quit date:      Years since quittin.7    Smokeless tobacco: Former User     Types: Snuff     Quit date:    Substance Use Topics    Alcohol use: Yes     Comment: \"Occ.  Beer Maybe Once A Month\"      [unfilled]  Review of Systems:   · Constitutional: No Fever or Weight Loss   · Eyes: No Decreased Vision  · ENT: No Headaches, Hearing Loss or Vertigo  · Cardiovascular: No chest pain, dyspnea on exertion, palpitations or loss of consciousness  · Respiratory: No cough or wheezing    · Gastrointestinal: No abdominal pain, appetite loss, blood in stools, constipation, diarrhea or heartburn  · Genitourinary: No dysuria, trouble voiding, or hematuria  · Musculoskeletal:  No gait disturbance, weakness or joint complaints  · Integumentary: No rash or pruritis  · Neurological: No TIA or stroke symptoms  · Psychiatric: No anxiety or depression  · Endocrine: No malaise, fatigue or temperature intolerance  · Hematologic/Lymphatic: No bleeding problems, blood clots or swollen lymph nodes  · Allergic/Immunologic: No nasal congestion or hives  All systems negative except as marked. Objective:  BP (!) 140/74   Pulse 68   Ht 5' 6\" (1.676 m)   Wt 188 lb (85.3 kg)   BMI 30.34 kg/m²   Wt Readings from Last 3 Encounters:   09/29/21 188 lb (85.3 kg)   08/10/21 187 lb 9.6 oz (85.1 kg)   07/27/21 187 lb (84.8 kg)     Body mass index is 30.34 kg/m². GENERAL - Alert, oriented, pleasant, in no apparent distress,normal grooming  HEENT  pupils are intact, cornea intact, conjunctive normal color, ears are normal in exam,  Neck - Supple. No jugular venous distention noted. No carotid bruits, no apical -carotid delay  Respiratory:  Normal breath sounds, No respiratory distress, No wheezing, No chest tenderness. ,no use of accessory muscles, diaphragm movement is normal  Cardiovascular: (PMI) apex non displaced,no lifts no thrills, no s3,no s4, Normal heart rate, Normal rhythm, No murmurs, No rubs, No gallops. Carotid arteries pulse and amplitude are normal no bruit, no abdominal bruit noted ( normal abdominal aorta ausculation),   Extremities - No cyanosis, clubbing, or significant edema, no varicose veins    Abdomen  No masses, tenderness, or organomegaly, no hepato-splenomegally, no bruits  Musculoskeletal  No significant edema, no kyphosis or scoliosis, no deformity in any extremity noted, muscle strength and tone are normal  Skin: no ulcer,no scar,no stasis dermatitis   Neurologic  alert oriented times 3,Cranial nerves II through XII are grossly intact. There were no gross focal neurologic abnormalities.    Psychiatric: normal mood and affect    No results found for: CKTOTAL, CKMB, CKMBINDEX, TROPONINI  BNP:  No results found for: BNP  PT/INR:  No results found for: PTINR  Lab Results   Component Value Date    LABA1C 7.6 08/11/2021    LABA1C 7.9 05/10/2021     Lab Results   Component Value Date    CHOL 157 05/10/2021    TRIG 239 (H) 05/10/2021    HDL 38 (L) 05/10/2021    LDLCALC 71 05/10/2021    LDLDIRECT 55 05/12/2020     Lab Results   Component Value Date    ALT 18 08/11/2021    AST 16 08/11/2021     TSH:  No results found for: TSH    Impression:  Sylwia Sun is a 78 y. o.year old who  has a past medical history of CAD (coronary artery disease), Cerebral artery occlusion with cerebral infarction ([address] Chronic back pain, Diabetes mellitus ([address] Erectile dysfunction, Gastritis, Glaucoma, H/O cardiovascular stress test, H/O echocardiogram, H/O left heart catheterization by cutdown, Hx of echocardiogram, Hyperlipidemia, Hypertension, Polyp of gallbladder, PVD (peripheral vascular disease) ([address] Teeth missing, and Wears glasses. and presents with     Plan:  1. CAD and cardiomyopathy :had covid vaccine,  LHC showed mild LAD disease and ventriculogram showed normal LVEF.,Continue aspirin,continue statins, ACEinhibitors, betablockers, repeat echo this time showed normal LVEF, MilD septal wall hypokinesis ( ECHO in 2017 had LEF 35%, however cath LVEF was normal)  2. Obesity: initially lost 20 lbs, and then gained 10 lsb, and then lost 4.5 lbs on adipex, however had urinary retention as a side effect of adipex, and hence stopped it, recommend to try KENIA over the counter, and work up and diet. recommend 1200 calories/day  3. DM: on jardiance ( pays 47 dollars co payment), continue metformin and glipizide and acotos ( acotos can cause fluid retention)  4. Dyslipidemia: continue statins  5. HTN: stable, continue lopressor and lisinopril medications, recommend to recheck at home and call me, if blood pressure > 140/90, will increase lisinopril to 30mg daily  6.  CKD: stable, Health maintenance: exeriseAll All labs, medications and tests reviewed, continue all other medications of all above

## 2021-09-29 NOTE — TELEPHONE ENCOUNTER
Patient called the office with blood pressure reading as directed by Dr Wendy Gutierrez. Patient reports Blood pressure is 130/78 and heart rate at 64. Dr Wendy Gutierrez aware and states no medication changes. Left message advising patient Dr eWndy Gutierrez aware of BP and HR and is not making any medication changes at this time. Asked patient to contact the office with any further questions or concerns.

## 2021-10-04 DIAGNOSIS — Z79.4 TYPE 2 DIABETES MELLITUS WITHOUT COMPLICATION, WITH LONG-TERM CURRENT USE OF INSULIN (HCC): ICD-10-CM

## 2021-10-04 DIAGNOSIS — E11.9 TYPE 2 DIABETES MELLITUS WITHOUT COMPLICATION, WITH LONG-TERM CURRENT USE OF INSULIN (HCC): ICD-10-CM

## 2021-11-16 ENCOUNTER — OFFICE VISIT (OUTPATIENT)
Dept: INTERNAL MEDICINE CLINIC | Age: 76
End: 2021-11-16
Payer: MEDICARE

## 2021-11-16 VITALS
DIASTOLIC BLOOD PRESSURE: 70 MMHG | BODY MASS INDEX: 30.51 KG/M2 | SYSTOLIC BLOOD PRESSURE: 130 MMHG | RESPIRATION RATE: 18 BRPM | HEART RATE: 75 BPM | OXYGEN SATURATION: 98 % | WEIGHT: 189 LBS

## 2021-11-16 DIAGNOSIS — Z23 NEEDS FLU SHOT: ICD-10-CM

## 2021-11-16 DIAGNOSIS — E11.21 TYPE 2 DIABETES MELLITUS WITH DIABETIC NEPHROPATHY, WITHOUT LONG-TERM CURRENT USE OF INSULIN (HCC): Primary | ICD-10-CM

## 2021-11-16 DIAGNOSIS — E78.2 MIXED HYPERLIPIDEMIA: ICD-10-CM

## 2021-11-16 DIAGNOSIS — E11.21 TYPE 2 DIABETES MELLITUS WITH DIABETIC NEPHROPATHY, WITHOUT LONG-TERM CURRENT USE OF INSULIN (HCC): ICD-10-CM

## 2021-11-16 DIAGNOSIS — I10 PRIMARY HYPERTENSION: ICD-10-CM

## 2021-11-16 DIAGNOSIS — N18.30 CHRONIC RENAL INSUFFICIENCY, STAGE 3 (MODERATE) (HCC): ICD-10-CM

## 2021-11-16 LAB
CREATININE URINE: 85.9 MG/DL (ref 39–259)
MICROALBUMIN UR-MCNC: 1.5 MG/DL
MICROALBUMIN/CREAT UR-RTO: 17.5 MG/G (ref 0–30)

## 2021-11-16 PROCEDURE — G0008 ADMIN INFLUENZA VIRUS VAC: HCPCS | Performed by: INTERNAL MEDICINE

## 2021-11-16 PROCEDURE — 36415 COLL VENOUS BLD VENIPUNCTURE: CPT | Performed by: INTERNAL MEDICINE

## 2021-11-16 PROCEDURE — G8484 FLU IMMUNIZE NO ADMIN: HCPCS | Performed by: INTERNAL MEDICINE

## 2021-11-16 PROCEDURE — 3051F HG A1C>EQUAL 7.0%<8.0%: CPT | Performed by: INTERNAL MEDICINE

## 2021-11-16 PROCEDURE — 1123F ACP DISCUSS/DSCN MKR DOCD: CPT | Performed by: INTERNAL MEDICINE

## 2021-11-16 PROCEDURE — 99214 OFFICE O/P EST MOD 30 MIN: CPT | Performed by: INTERNAL MEDICINE

## 2021-11-16 PROCEDURE — 4040F PNEUMOC VAC/ADMIN/RCVD: CPT | Performed by: INTERNAL MEDICINE

## 2021-11-16 PROCEDURE — 3017F COLORECTAL CA SCREEN DOC REV: CPT | Performed by: INTERNAL MEDICINE

## 2021-11-16 PROCEDURE — G8417 CALC BMI ABV UP PARAM F/U: HCPCS | Performed by: INTERNAL MEDICINE

## 2021-11-16 PROCEDURE — 90694 VACC AIIV4 NO PRSRV 0.5ML IM: CPT | Performed by: INTERNAL MEDICINE

## 2021-11-16 PROCEDURE — 2022F DILAT RTA XM EVC RTNOPTHY: CPT | Performed by: INTERNAL MEDICINE

## 2021-11-16 PROCEDURE — G8427 DOCREV CUR MEDS BY ELIG CLIN: HCPCS | Performed by: INTERNAL MEDICINE

## 2021-11-16 PROCEDURE — 1036F TOBACCO NON-USER: CPT | Performed by: INTERNAL MEDICINE

## 2021-11-23 DIAGNOSIS — E78.2 MIXED HYPERLIPIDEMIA: ICD-10-CM

## 2021-11-23 DIAGNOSIS — E11.21 TYPE 2 DIABETES MELLITUS WITH DIABETIC NEPHROPATHY, WITHOUT LONG-TERM CURRENT USE OF INSULIN (HCC): Primary | ICD-10-CM

## 2021-11-23 DIAGNOSIS — I10 PRIMARY HYPERTENSION: ICD-10-CM

## 2021-11-23 DIAGNOSIS — N18.30 CHRONIC RENAL INSUFFICIENCY, STAGE 3 (MODERATE) (HCC): ICD-10-CM

## 2021-11-23 LAB
A/G RATIO: 1.6 (ref 1.1–2.2)
ALBUMIN SERPL-MCNC: 4.6 G/DL (ref 3.4–5)
ALP BLD-CCNC: 93 U/L (ref 40–129)
ALT SERPL-CCNC: 19 U/L (ref 10–40)
ANION GAP SERPL CALCULATED.3IONS-SCNC: 15 MMOL/L (ref 3–16)
AST SERPL-CCNC: 19 U/L (ref 15–37)
BASOPHILS ABSOLUTE: 0 K/UL (ref 0–0.2)
BASOPHILS RELATIVE PERCENT: 0.4 %
BILIRUB SERPL-MCNC: 0.3 MG/DL (ref 0–1)
BUN BLDV-MCNC: 40 MG/DL (ref 7–20)
CALCIUM SERPL-MCNC: 9.6 MG/DL (ref 8.3–10.6)
CHLORIDE BLD-SCNC: 104 MMOL/L (ref 99–110)
CHOLESTEROL, TOTAL: 129 MG/DL (ref 0–199)
CO2: 21 MMOL/L (ref 21–32)
CREAT SERPL-MCNC: 1.6 MG/DL (ref 0.8–1.3)
EOSINOPHILS ABSOLUTE: 0.1 K/UL (ref 0–0.6)
EOSINOPHILS RELATIVE PERCENT: 1.5 %
GFR AFRICAN AMERICAN: 51
GFR NON-AFRICAN AMERICAN: 42
GLUCOSE BLD-MCNC: 154 MG/DL (ref 70–99)
HCT VFR BLD CALC: 39.9 % (ref 40.5–52.5)
HDLC SERPL-MCNC: 38 MG/DL (ref 40–60)
HEMOGLOBIN: 13.1 G/DL (ref 13.5–17.5)
LDL CHOLESTEROL CALCULATED: 56 MG/DL
LYMPHOCYTES ABSOLUTE: 1.6 K/UL (ref 1–5.1)
LYMPHOCYTES RELATIVE PERCENT: 28.5 %
MCH RBC QN AUTO: 32.1 PG (ref 26–34)
MCHC RBC AUTO-ENTMCNC: 32.8 G/DL (ref 31–36)
MCV RBC AUTO: 97.8 FL (ref 80–100)
MONOCYTES ABSOLUTE: 0.5 K/UL (ref 0–1.3)
MONOCYTES RELATIVE PERCENT: 8.9 %
NEUTROPHILS ABSOLUTE: 3.5 K/UL (ref 1.7–7.7)
NEUTROPHILS RELATIVE PERCENT: 60.7 %
PDW BLD-RTO: 14 % (ref 12.4–15.4)
PLATELET # BLD: 196 K/UL (ref 135–450)
PMV BLD AUTO: 9.4 FL (ref 5–10.5)
POTASSIUM SERPL-SCNC: 5.1 MMOL/L (ref 3.5–5.1)
RBC # BLD: 4.07 M/UL (ref 4.2–5.9)
SODIUM BLD-SCNC: 140 MMOL/L (ref 136–145)
TOTAL PROTEIN: 7.4 G/DL (ref 6.4–8.2)
TRIGL SERPL-MCNC: 174 MG/DL (ref 0–150)
VLDLC SERPL CALC-MCNC: 35 MG/DL
WBC # BLD: 5.7 K/UL (ref 4–11)

## 2021-11-24 ENCOUNTER — TELEPHONE (OUTPATIENT)
Dept: INTERNAL MEDICINE CLINIC | Age: 76
End: 2021-11-24

## 2021-11-24 LAB
ESTIMATED AVERAGE GLUCOSE: 177.2 MG/DL
HBA1C MFR BLD: 7.8 %

## 2021-11-24 NOTE — TELEPHONE ENCOUNTER
----- Message from Thierry Aparicio sent at 11/24/2021  8:22 AM EST -----  Subject: Message to Provider    QUESTIONS  Information for Provider? pt is returning a call from yesterday. He was   told to call back this morning, but no answer at the office  ---------------------------------------------------------------------------  --------------  [address]  What is the best way for the office to contact you? OK to leave message on   voicemail  Preferred Call Back Phone Number?  [phone]  ---------------------------------------------------------------------------  --------------  SCRIPT ANSWERS  undefined

## 2021-12-30 ENCOUNTER — HOSPITAL ENCOUNTER (OUTPATIENT)
Age: 76
Setting detail: SPECIMEN
Discharge: HOME | End: 2021-12-30
Payer: MEDICARE

## 2021-12-30 ENCOUNTER — VIRTUAL VISIT (OUTPATIENT)
Dept: INTERNAL MEDICINE CLINIC | Age: 76
End: 2021-12-30
Payer: MEDICARE

## 2021-12-30 DIAGNOSIS — M54.32 SCIATICA OF LEFT SIDE: ICD-10-CM

## 2021-12-30 DIAGNOSIS — J01.11 ACUTE RECURRENT FRONTAL SINUSITIS: Primary | ICD-10-CM

## 2021-12-30 PROCEDURE — U0005 INFEC AGEN DETEC AMPLI PROBE: HCPCS

## 2021-12-30 PROCEDURE — 99442 PR PHYS/QHP TELEPHONE EVALUATION 11-20 MIN: CPT | Performed by: INTERNAL MEDICINE

## 2021-12-30 PROCEDURE — U0003 INFECTIOUS AGENT DETECTION BY NUCLEIC ACID (DNA OR RNA); SEVERE ACUTE RESPIRATORY SYNDROME CORONAVIRUS 2 (SARS-COV-2) (CORONAVIRUS DISEASE [COVID-19]), AMPLIFIED PROBE TECHNIQUE, MAKING USE OF HIGH THROUGHPUT TECHNOLOGIES AS DESCRIBED BY CMS-2020-01-R: HCPCS

## 2021-12-30 RX ORDER — GLIPIZIDE 10 MG/1
10 TABLET ORAL 2 TIMES DAILY
Qty: 180 TABLET | Refills: 3 | Status: SHIPPED | OUTPATIENT
Start: 2021-12-30

## 2021-12-30 RX ORDER — AZITHROMYCIN 250 MG/1
250 TABLET, FILM COATED ORAL SEE ADMIN INSTRUCTIONS
Qty: 6 TABLET | Refills: 0 | Status: SHIPPED | OUTPATIENT
Start: 2021-12-30 | End: 2022-01-04

## 2021-12-30 RX ORDER — ATORVASTATIN CALCIUM 80 MG/1
TABLET, FILM COATED ORAL
Qty: 90 TABLET | Refills: 3 | Status: SHIPPED | OUTPATIENT
Start: 2021-12-30

## 2021-12-30 RX ORDER — FLUTICASONE PROPIONATE 50 MCG
2 SPRAY, SUSPENSION (ML) NASAL DAILY
Qty: 16 G | Refills: 0 | Status: SHIPPED | OUTPATIENT
Start: 2021-12-30 | End: 2022-06-06

## 2021-12-30 RX ORDER — TIZANIDINE 2 MG/1
2-4 TABLET ORAL 4 TIMES DAILY PRN
Qty: 120 TABLET | Refills: 2 | Status: SHIPPED | OUTPATIENT
Start: 2021-12-30 | End: 2022-05-11 | Stop reason: ALTCHOICE

## 2021-12-30 NOTE — PROGRESS NOTES
Magdiel Fernandez is a 78 y.o. male evaluated via telephone on 12/30/2021. Consent:  He and/or health care decision maker is aware that that he may receive a bill for this telephone service, depending on his insurance coverage, and has provided verbal consent to proceed: Yes      Documentation:  I communicated with the patient and/or health care decision maker about rhinorrhea. Details of this discussion including any medical advice provided:     Pt complains of rhinorrhea productive of clear mucous, ear congestion, PND, frontal HA, mild cough. He denies SOB, wheezing, F/C, N/V, diarrhea. Symptoms started Tuesday. He has used alkaseltzer (+), OTC sinus meds    Plan:  - check for covid    - Tx possible sinusitis with flonase, sinus rinse, azith      I affirm this is a Patient Initiated Episode with a Patient who has not had a related appointment within my department in the past 7 days or scheduled within the next 24 hours. Patient identification was verified at the start of the visit: Yes    Total Time: minutes: 11-20 minutes    The visit was conducted pursuant to the emergency declaration under the [facility], [address] [place] [address] authority and the [employer] General Act. Patient identification was verified, and a caregiver was present when appropriate. The patient was located in a state where the provider was credentialed to provide care.     Note: not billable if this call serves to triage the patient into an appointment for the relevant concern      Heaven Daley MD

## 2021-12-31 LAB — SARS-COV-2: NOT DETECTED

## 2022-01-25 ENCOUNTER — HOSPITAL ENCOUNTER (OUTPATIENT)
Dept: GENERAL RADIOLOGY | Age: 77
Discharge: HOME | End: 2022-01-25
Payer: MEDICARE

## 2022-01-25 ENCOUNTER — HOSPITAL ENCOUNTER (OUTPATIENT)
Age: 77
Discharge: HOME | End: 2022-01-25
Payer: MEDICARE

## 2022-01-25 ENCOUNTER — OFFICE VISIT (OUTPATIENT)
Dept: INTERNAL MEDICINE CLINIC | Age: 77
End: 2022-01-25
Payer: MEDICARE

## 2022-01-25 VITALS
OXYGEN SATURATION: 98 % | RESPIRATION RATE: 16 BRPM | DIASTOLIC BLOOD PRESSURE: 70 MMHG | HEART RATE: 74 BPM | WEIGHT: 191 LBS | SYSTOLIC BLOOD PRESSURE: 128 MMHG | BODY MASS INDEX: 30.83 KG/M2

## 2022-01-25 DIAGNOSIS — M79.674 PAIN OF TOE OF RIGHT FOOT: ICD-10-CM

## 2022-01-25 DIAGNOSIS — S92.424A CLOSED NONDISPLACED FRACTURE OF DISTAL PHALANX OF RIGHT GREAT TOE, INITIAL ENCOUNTER: Primary | ICD-10-CM

## 2022-01-25 DIAGNOSIS — E11.21 TYPE 2 DIABETES MELLITUS WITH DIABETIC NEPHROPATHY, WITHOUT LONG-TERM CURRENT USE OF INSULIN (HCC): ICD-10-CM

## 2022-01-25 DIAGNOSIS — I73.9 PAD (PERIPHERAL ARTERY DISEASE) (HCC): ICD-10-CM

## 2022-01-25 DIAGNOSIS — I10 PRIMARY HYPERTENSION: ICD-10-CM

## 2022-01-25 DIAGNOSIS — I42.0 DILATED CARDIOMYOPATHY (HCC): ICD-10-CM

## 2022-01-25 DIAGNOSIS — N18.30 CHRONIC RENAL INSUFFICIENCY, STAGE 3 (MODERATE) (HCC): ICD-10-CM

## 2022-01-25 DIAGNOSIS — G25.0 TREMOR, ESSENTIAL: Primary | ICD-10-CM

## 2022-01-25 PROCEDURE — G8484 FLU IMMUNIZE NO ADMIN: HCPCS | Performed by: INTERNAL MEDICINE

## 2022-01-25 PROCEDURE — 3046F HEMOGLOBIN A1C LEVEL >9.0%: CPT | Performed by: INTERNAL MEDICINE

## 2022-01-25 PROCEDURE — 1123F ACP DISCUSS/DSCN MKR DOCD: CPT | Performed by: INTERNAL MEDICINE

## 2022-01-25 PROCEDURE — 1036F TOBACCO NON-USER: CPT | Performed by: INTERNAL MEDICINE

## 2022-01-25 PROCEDURE — 4040F PNEUMOC VAC/ADMIN/RCVD: CPT | Performed by: INTERNAL MEDICINE

## 2022-01-25 PROCEDURE — G8417 CALC BMI ABV UP PARAM F/U: HCPCS | Performed by: INTERNAL MEDICINE

## 2022-01-25 PROCEDURE — 73630 X-RAY EXAM OF FOOT: CPT

## 2022-01-25 PROCEDURE — 99214 OFFICE O/P EST MOD 30 MIN: CPT | Performed by: INTERNAL MEDICINE

## 2022-01-25 PROCEDURE — 3017F COLORECTAL CA SCREEN DOC REV: CPT | Performed by: INTERNAL MEDICINE

## 2022-01-25 PROCEDURE — 2022F DILAT RTA XM EVC RTNOPTHY: CPT | Performed by: INTERNAL MEDICINE

## 2022-01-25 PROCEDURE — G8427 DOCREV CUR MEDS BY ELIG CLIN: HCPCS | Performed by: INTERNAL MEDICINE

## 2022-01-25 RX ORDER — EMPAGLIFLOZIN 10 MG/1
1 TABLET, FILM COATED ORAL DAILY
Qty: 30 TABLET | Refills: 11 | Status: SHIPPED | OUTPATIENT
Start: 2022-01-25 | End: 2022-05-18 | Stop reason: ALTCHOICE

## 2022-01-25 NOTE — PROGRESS NOTES
Tristen BENTON Ji  [birth year]  01/25/22    SUBJECTIVE:    Pt tripped when he kicked his right great toe while climbing steps. Initially there was a little pain, but since then there has been persistent pain in the toe. This occurred last week. He has used topical meds, soaks with little benefit. Pain is in the MTP joint. Pt has noticed that in the morning he tends to have mild tremors which resolve later in the day. Tremors occur with activity eg using utensils. This does not cause any problems. His sister had similar tremors. Patient compliant with medications for diabetes. Blood sugars have averaged around 120, with pt checking blood sugar 2 time daily. Patient has had no episodes of significant hypoglycemia. He is planning on going back to the gym. The patient is taking hypertensive medications compliantly without side effects. Denies chest pain, dyspnea, edema, or TIA's. Pt with CDM, on BB and ACE-I. He does have mild PVD, no current symptoms. He is on statin, asa. OBJECTIVE:    /70   Pulse 74   Resp 16   Wt 191 lb (86.6 kg)   SpO2 98%   BMI 30.83 kg/m²     Physical Exam  Constitutional:       Appearance: He is well-developed. Eyes:      General: No scleral icterus. Conjunctiva/sclera: Conjunctivae normal.   Neck:      Thyroid: No thyromegaly. Vascular: No JVD. Trachea: No tracheal deviation. Cardiovascular:      Rate and Rhythm: Normal rate and regular rhythm. Heart sounds: Normal heart sounds. Pulmonary:      Effort: Pulmonary effort is normal. No respiratory distress. Breath sounds: Normal breath sounds. Abdominal:      General: There is no distension. Palpations: Abdomen is soft. There is no mass. Tenderness: There is no abdominal tenderness. There is no guarding or rebound. Musculoskeletal:      Cervical back: Neck supple. Lymphadenopathy:      Cervical: No cervical adenopathy. Skin:     Nails: There is no clubbing. Neurological:      Mental Status: He is alert and oriented to person, place, and time. Comments: Nonfocal   Psychiatric:         Behavior: Behavior normal.         Judgment: Judgment normal.       No erythema, wamrth, swelling. Tenderness to palpation of rigth 1st MTP    ASSESSMENT:    1. Type 2 diabetes mellitus with diabetic nephropathy, without long-term current use of insulin ([facility] [address]    2. Primary hypertension    3. Chronic renal insufficiency, stage 3 (moderate) (HCC)    4. Pain of toe of right foot    5. Dilated cardiomyopathy ([address]    6. PAD (peripheral artery disease) ([address]    7. Tremor, essential        PLAN:    Char Esqueda was seen today for toe injury and tremors. Diagnoses and all orders for this visit:    Type 2 diabetes mellitus with diabetic nephropathy, without long-term current use of insulin ([address] - cont jardiance - seems to be doing well. -     empagliflozin (JARDIANCE) 10 MG tablet; Take 1 tablet by mouth daily    Primary hypertension - at goal;l no change     Chronic renal insufficiency, stage 3 (moderate) (HCC) - on jardiance, lisinopril    Pain of toe of right foot - will check xray; if there is a fracture will refer to ortho  -     XR FOOT RIGHT (MIN 3 VIEWS);  Future    Dilated cardiomyopathy (HCC) - on BB, ACE-I    PAD (peripheral artery disease) (HCC) - no current symptoms; on medical Tx    Tremor- likely essential tremor; discussed options for Tx, declines for now

## 2022-01-28 ENCOUNTER — OFFICE VISIT (OUTPATIENT)
Dept: ORTHOPEDIC SURGERY | Age: 77
End: 2022-01-28
Payer: MEDICARE

## 2022-01-28 VITALS — RESPIRATION RATE: 19 BRPM | BODY MASS INDEX: 30.7 KG/M2 | HEIGHT: 66 IN | WEIGHT: 191 LBS

## 2022-01-28 DIAGNOSIS — S92.424A NONDISPLACED FRACTURE OF DISTAL PHALANX OF RIGHT GREAT TOE, INITIAL ENCOUNTER FOR CLOSED FRACTURE: Primary | ICD-10-CM

## 2022-01-28 PROCEDURE — 3017F COLORECTAL CA SCREEN DOC REV: CPT | Performed by: ORTHOPAEDIC SURGERY

## 2022-01-28 PROCEDURE — 1123F ACP DISCUSS/DSCN MKR DOCD: CPT | Performed by: ORTHOPAEDIC SURGERY

## 2022-01-28 PROCEDURE — 1036F TOBACCO NON-USER: CPT | Performed by: ORTHOPAEDIC SURGERY

## 2022-01-28 PROCEDURE — 99203 OFFICE O/P NEW LOW 30 MIN: CPT | Performed by: ORTHOPAEDIC SURGERY

## 2022-01-28 PROCEDURE — G8427 DOCREV CUR MEDS BY ELIG CLIN: HCPCS | Performed by: ORTHOPAEDIC SURGERY

## 2022-01-28 PROCEDURE — G8417 CALC BMI ABV UP PARAM F/U: HCPCS | Performed by: ORTHOPAEDIC SURGERY

## 2022-01-28 PROCEDURE — 28490 TREAT BIG TOE FRACTURE: CPT | Performed by: ORTHOPAEDIC SURGERY

## 2022-01-28 PROCEDURE — G8484 FLU IMMUNIZE NO ADMIN: HCPCS | Performed by: ORTHOPAEDIC SURGERY

## 2022-01-28 PROCEDURE — 4040F PNEUMOC VAC/ADMIN/RCVD: CPT | Performed by: ORTHOPAEDIC SURGERY

## 2022-02-03 PROBLEM — S92.424A NONDISPLACED FRACTURE OF DISTAL PHALANX OF RIGHT GREAT TOE, INITIAL ENCOUNTER FOR CLOSED FRACTURE: Status: ACTIVE | Noted: 2022-02-03

## 2022-02-03 ASSESSMENT — ENCOUNTER SYMPTOMS
WHEEZING: 0
SHORTNESS OF BREATH: 0
VOMITING: 0
CHEST TIGHTNESS: 0
COLOR CHANGE: 0
EYE REDNESS: 0
EYE PAIN: 0

## 2022-02-03 NOTE — PROGRESS NOTES
1/28/2022   Chief Complaint   Patient presents with    Foot Injury     right greater toe DOI: 01/19/22        History of Present Illness:                             Rolan Fuentes is a 78 y.o. male who presents today for evaluation of his right great toe pain. He had an injury walking on stairs on 1/19/2022. He had a direct impact to the forefoot and specifically the great toe. Subsequent to this he had pain and swelling at the great toe which is worse with direct pressure or pushoff activities. He has been able to continue ambulating with no pressure to his heel. Pain has been better with rest and elevation. Patient presents to the office with a 01/19/22 distal phalanx of the right greater toe. Pt stated this happened on 01/19/22. Pt stated he stubbed his toe when walking down the stairs and the pain increasingly got worse. Pt stated that he has a general tenderness on his big toe and on the lateral aspect of his toe. Pt stated he will have shooting pains that will radiate on the volar aspect of his foot. Pt does not have issues walking but with increased weightbearing the pain will also increase. Pt has been doing salt water baths to decrease the swelling and taking tylenol with relief. Pt rates his pain a 6/10 today.                        Medical History  Patient's medications, allergies, past medical, surgical, social and family histories were reviewed and updated as appropriate. Past Medical History:   Diagnosis Date    CAD (coronary artery disease)     Sees Dr. Magi Velarde Cerebral artery occlusion with cerebral infarction [facility]) Dx 1990's    \"TIA's\" No Residual     Chronic back pain     Diabetes mellitus ([facility] [address] Dx 2002    Erectile dysfunction     Gastritis     4/13 EGD - mild gastritis    Glaucoma     Bilateral Eyes    H/O cardiovascular stress test 05/25/2012    2012, Ragini, EF 54 %, Normal pattern of perfusion in all regions.     H/O echocardiogram [id number],[id number]    2009, EF 45-50%, Trace MR & TR    H/O left heart catheterization by cutdown 08/2015    Hx of echocardiogram 02/10/2021    EF 50-55%. No significant valvular disease noted.  Hyperlipidemia     5/12 Stress WNL, EF 54%; 8/11 - Stress Test WNL, EF 58%    Hypertension     Polyp of gallbladder     PVD (peripheral vascular disease) ([address] 08/10/2016    [facility] sent copy of Bilat Lower Arterial US, scanned into Epic. Mild PVD .     Teeth missing     Upper And Lower    Wears glasses     To Read     Past Surgical History:   Procedure Laterality Date    CARDIAC CATHETERIZATION  Last Done 01/18/2017    CARPAL TUNNEL RELEASE Bilateral 1979    Done At Separate Times    CHOLECYSTECTOMY, LAPAROSCOPIC N/A 08/08/2017    COLONOSCOPY  10/2007    adenoma xl Sara    COLONOSCOPY  1/27/16    polyp x2    DENTAL SURGERY      Teeth Extracted In Past    ENDOSCOPY, COLON, DIAGNOSTIC  4-9-13    gastritis    EYE SURGERY Bilateral 1990's    Cataracts With Lens Implants    KNEE SURGERY Left 2012    TOENAIL EXCISION Right 1997    Great Toe    VASECTOMY  1987     Family History   Problem Relation Age of Onset    Early Death Mother 47    Heart Disease Mother     Diabetes Father     Heart Attack Sister     Heart Disease Sister         Heart Attack    Other Sister         Polio    Early Death Brother 52        Heart Attack    Heart Attack Brother     Heart Disease Brother         Heart Attack    Diabetes Brother     Heart Attack Brother     Heart Disease Brother         Heart Attack    Diabetes Sister     Depression Sister      Social History     Socioeconomic History    Marital status:      Spouse name: None    Number of children: None    Years of education: None    Highest education level: None   Occupational History    Occupation: Employed     Comment: [employer]   Tobacco Use    Smoking status: Former Smoker     Packs/day: 0.50     Years: 22.00     Pack years: 11.00     Types: Pipe, Cigars, Cigarettes     Start date: 1970 Quit date:  [facility]     Years since quittin.1    Smokeless tobacco: Former User     Types: Snuff     Quit date:    Vaping Use    Vaping Use: Never used   Substance and Sexual Activity    Alcohol use: Yes     Comment: \"Occ. Beer Maybe Once A Month\"    Drug use: No    Sexual activity: Not Currently     Comment:     Other Topics Concern    None   Social History Narrative    Diet: Diabetic    Exercise: Sometimes    Seat Belts: Sometimes     Social Determinants of Health     Financial Resource Strain: Low Risk     Difficulty of Paying Living Expenses: Not hard at all   Food Insecurity: No Food Insecurity    Worried About Running Out of Food in the Last Year: Never true    Johnathon of Food in the Last Year: Never true   Transportation Needs: No Transportation Needs    Lack of Transportation (Medical): No    Lack of Transportation (Non-Medical):  No   Physical Activity:     Days of Exercise per Week: Not on file    Minutes of Exercise per Session: Not on file   Stress:     Feeling of Stress : Not on file   Social Connections:     Frequency of Communication with Friends and Family: Not on file    Frequency of Social Gatherings with Friends and Family: Not on file    Attends Protestant Services: Not on file    Active Member of [address] [place] [address] or Organizations: Not on file    Attends Club or Organization Meetings: Not on file    Marital Status: Not on file   Intimate Partner Violence:     Fear of Current or Ex-Partner: Not on file    Emotionally Abused: Not on file    Physically Abused: Not on file    Sexually Abused: Not on file   Housing Stability:     Unable to Pay for Housing in the Last Year: Not on file    Number of [place] in the Last Year: Not on file    Unstable Housing in the Last Year: Not on file     Current Outpatient Medications   Medication Sig Dispense Refill    empagliflozin (JARDIANCE) 10 MG tablet Take 1 tablet by mouth daily 30 tablet 11    atorvastatin (LIPITOR) 80 MG Musculoskeletal: Positive for arthralgias. Skin: Negative for color change and rash. Neurological: Negative for weakness and numbness. Psychiatric/Behavioral: Negative for agitation. The patient is not nervous/anxious. Examination:  General Exam:  Vitals: Resp 19   Ht 5' 6\" (1.676 m)   Wt 191 lb (86.6 kg)   BMI 30.83 kg/m²    Physical Exam  Vitals and nursing note reviewed. Constitutional:       Appearance: Normal appearance. HENT:      Head: Normocephalic and atraumatic. Eyes:      Conjunctiva/sclera: Conjunctivae normal.      Pupils: Pupils are equal, round, and reactive to light. Pulmonary:      Effort: Pulmonary effort is normal.   Musculoskeletal:      Cervical back: Normal range of motion. Right knee: No swelling, effusion, ecchymosis or bony tenderness. Normal range of motion. No tenderness. No medial joint line or lateral joint line tenderness. No LCL laxity or MCL laxity. Normal alignment. Left foot: Normal range of motion and normal capillary refill. No swelling, deformity, laceration, tenderness, bony tenderness or crepitus. Comments: Right lower extremity:  Tenderness palpation and swelling specifically over the distal phalanx of great toe IP joint. Normal alignment of the forefoot and great toe. No instability of the left stress examination across the great toe MP and IP joints. Range of motion is limited but intact with active flexion extension across the MP and IP joints of the great toe. Skin is intact. Brisk cap refill present. 2+ radial pulse. Sensation is intact to light touch throughout. No tenderness palpation of the ankle and hindfoot. Skin:     General: Skin is warm and dry. Neurological:      Mental Status: He is alert and oriented to person, place, and time.    Psychiatric:         Mood and Affect: Mood normal.         Behavior: Behavior normal.            Diagnostic testing:  X-ray images were

## 2022-02-09 ENCOUNTER — VIRTUAL VISIT (OUTPATIENT)
Dept: INTERNAL MEDICINE CLINIC | Age: 77
End: 2022-02-09
Payer: MEDICARE

## 2022-02-09 DIAGNOSIS — U07.1 COVID-19: Primary | ICD-10-CM

## 2022-02-09 PROCEDURE — 99442 PR PHYS/QHP TELEPHONE EVALUATION 11-20 MIN: CPT | Performed by: INTERNAL MEDICINE

## 2022-02-09 RX ORDER — DOXYCYCLINE 100 MG/1
CAPSULE ORAL
COMMUNITY
Start: 2022-02-01 | End: 2022-02-09

## 2022-02-09 RX ORDER — METHYLPREDNISOLONE 4 MG/1
TABLET ORAL
COMMUNITY
Start: 2022-02-01 | End: 2022-02-09

## 2022-02-09 RX ORDER — DEXTROMETHORPHAN HYDROBROMIDE AND PROMETHAZINE HYDROCHLORIDE 15; 6.25 MG/5ML; MG/5ML
SYRUP ORAL
COMMUNITY
Start: 2022-02-05 | End: 2022-02-09

## 2022-02-09 NOTE — PROGRESS NOTES
Amarjit Ferguson is a 78 y.o. male evaluated via telephone on 2/9/2022. Consent:  He and/or health care decision maker is aware that that he may receive a bill for this telephone service, which includes applicable co-pays, depending on his insurance coverage, and has provided verbal consent to proceed. Documentation:  I communicated with the patient and/or health care decision maker about cough. Details of this discussion including any medical advice provided:     Pt with HA, sinus congestion, ear congestion, rhinorrhea, nasal congestion, cough with occas clear mucous, mild SOB with exertion, myalgias, fatigue, F/C. He denies ear pain, wheezing, loss of taste or myalgia. Symptoms started 1/28. Pt went to urgent care 2/2 was (+) for covid. He was given doxy, promethazine cough syrup, and steroids. Despite these meds he continues to have symptoms. SaO2 has been 98%    PLAN:  - Tx symptoms with sinus rinse, cough med   - outside of window for sotravimab   - to ER if SaO2 < 90, signifcant dyspnea    I affirm this is a Patient Initiated Episode with a Patient who has not had a related appointment within my department in the past 7 days or scheduled within the next 24 hours. Patient identification was verified at the start of the visit: Yes    Total Time: minutes: 11-20 minutes    Tristen Workman was evaluated through a synchronous (real-time) audio encounter. The patient was located at home in a state where the provider was licensed to provide care.     Note: not billable if this call serves to triage the patient into an appointment for the relevant concern      Jaye Tan MD

## 2022-02-16 ENCOUNTER — OFFICE VISIT (OUTPATIENT)
Dept: CARDIOLOGY CLINIC | Age: 77
End: 2022-02-16
Payer: MEDICARE

## 2022-02-16 VITALS
BODY MASS INDEX: 29.35 KG/M2 | DIASTOLIC BLOOD PRESSURE: 64 MMHG | WEIGHT: 187 LBS | HEIGHT: 67 IN | SYSTOLIC BLOOD PRESSURE: 138 MMHG | HEART RATE: 83 BPM

## 2022-02-16 DIAGNOSIS — R07.9 CHEST PAIN, UNSPECIFIED TYPE: Primary | ICD-10-CM

## 2022-02-16 PROCEDURE — 3017F COLORECTAL CA SCREEN DOC REV: CPT | Performed by: INTERNAL MEDICINE

## 2022-02-16 PROCEDURE — G8427 DOCREV CUR MEDS BY ELIG CLIN: HCPCS | Performed by: INTERNAL MEDICINE

## 2022-02-16 PROCEDURE — 1123F ACP DISCUSS/DSCN MKR DOCD: CPT | Performed by: INTERNAL MEDICINE

## 2022-02-16 PROCEDURE — 99214 OFFICE O/P EST MOD 30 MIN: CPT | Performed by: INTERNAL MEDICINE

## 2022-02-16 PROCEDURE — G8417 CALC BMI ABV UP PARAM F/U: HCPCS | Performed by: INTERNAL MEDICINE

## 2022-02-16 PROCEDURE — G8484 FLU IMMUNIZE NO ADMIN: HCPCS | Performed by: INTERNAL MEDICINE

## 2022-02-16 PROCEDURE — 93000 ELECTROCARDIOGRAM COMPLETE: CPT | Performed by: INTERNAL MEDICINE

## 2022-02-16 PROCEDURE — 1036F TOBACCO NON-USER: CPT | Performed by: INTERNAL MEDICINE

## 2022-02-16 PROCEDURE — 4040F PNEUMOC VAC/ADMIN/RCVD: CPT | Performed by: INTERNAL MEDICINE

## 2022-02-16 NOTE — PROGRESS NOTES
Merlin Christie MD        OFFICE  FOLLOWUP NOTE    Chief complaints: patient is here for management of CAD, DM, HTN, dyspnea, DYSLPIDEMIA, CKD and obesity, covid 19    Subjective: + chest pain, no shortness of breath, no dizziness, no palpitations    HPI Stefany Momin is a 78 y. o.year old who  has a past medical history of CAD (coronary artery disease), Cerebral artery occlusion with cerebral infarction ([address] Chronic back pain, Diabetes mellitus ([address] Erectile dysfunction, Gastritis, Glaucoma, H/O cardiovascular stress test, H/O echocardiogram, H/O left heart catheterization by cutdown, Hx of echocardiogram, Hyperlipidemia, Hypertension, Polyp of gallbladder, PVD (peripheral vascular disease) ([address] Teeth missing, and Wears glasses.  and presents for management of CAD, DM, HTN, dyspnea, DYSLPIDEMIA, CKD and obesity, covid 19  which are well controlled  He had covid 19 two weeks ago and last night had chest pain like a heart burn    Current Outpatient Medications   Medication Sig Dispense Refill    empagliflozin (JARDIANCE) 10 MG tablet Take 1 tablet by mouth daily 30 tablet 11    atorvastatin (LIPITOR) 80 MG tablet TAKE 1 TABLET BY MOUTH AT BEDTIME 90 tablet 3    glipiZIDE (GLUCOTROL) 10 MG tablet Take 1 tablet by mouth 2 times daily 180 tablet 3    tiZANidine (ZANAFLEX) 2 MG tablet Take 1-2 tablets by mouth 4 times daily as needed (back pain) 120 tablet 2    fluticasone (FLONASE) 50 MCG/ACT nasal spray 2 sprays by Each Nostril route daily 16 g 0    lisinopril (PRINIVIL;ZESTRIL) 20 MG tablet Take 1 tablet by mouth once daily 90 tablet 1    furosemide (LASIX) 20 MG tablet TAKE 1 TABLET BY MOUTH EVERY OTHER DAY 45 tablet 1    metFORMIN (GLUCOPHAGE) 500 MG tablet TAKE 2 TABLETS BY MOUTH TWICE DAILY WITH MEALS 360 tablet 0    metoprolol tartrate (LOPRESSOR) 50 MG tablet Take 1 tablet by mouth 2 times daily 180 tablet 3    pioglitazone (ACTOS) 15 MG tablet Take 1 tablet by mouth daily 90 tablet 3  timolol (TIMOPTIC) 0.5 % ophthalmic solution INSTILL 1 DROP INTO EACH EYE IN THE MORNING      SOFT TOUCH LANCETS MISC Test three times daily or prn for abnormal glucose 100 each 3    Blood Glucose Monitoring Suppl (ACCU-CHEK ARYAN PLUS) w/Device KIT Test three times daily or prn for abnormal glucose 1 kit 0    latanoprost (XALATAN) 0.005 % ophthalmic solution Place 1 drop into both eyes nightly 1 Bottle 6    aspirin 325 MG EC tablet Take 325 mg by mouth nightly Over The Counter      blood glucose test strips (ACCU-CHEK ARYAN PLUS) strip 1 each by In Vitro route 2 times daily Or as needed. 300 each 3     No current facility-administered medications for this visit. Allergies: Penicillins, Sulfa antibiotics, and Lyrica [pregabalin]  Past Medical History:   Diagnosis Date    CAD (coronary artery disease)     Sees Dr. Marylee Beech Cerebral artery occlusion with cerebral infarction [facility]) Dx 1990's    \"TIA's\" No Residual     Chronic back pain     Diabetes mellitus ([facility] [address] Dx 2002    Erectile dysfunction     Gastritis     4/13 EGD - mild gastritis    Glaucoma     Bilateral Eyes    H/O cardiovascular stress test 05/25/2012 2012, Ragini, EF 54 %, Normal pattern of perfusion in all regions.  H/O echocardiogram 2009,2005 2009, EF 45-50%, Trace MR & TR    H/O left heart catheterization by cutdown 08/2015    Hx of echocardiogram 02/10/2021    EF 50-55%. No significant valvular disease noted.  Hyperlipidemia     5/12 Stress WNL, EF 54%; 8/11 - Stress Test WNL, EF 58%    Hypertension     Polyp of gallbladder     PVD (peripheral vascular disease) ([facility] [address] 08/10/2016    [facility] sent copy of Bilat Lower Arterial US, scanned into Epic. Mild PVD .     Teeth missing     Upper And Lower    Wears glasses     To Read     Past Surgical History:   Procedure Laterality Date    CARDIAC CATHETERIZATION  Last Done 01/18/2017    CARPAL TUNNEL RELEASE Bilateral 1979    Done At Separate Times    CHOLECYSTECTOMY, LAPAROSCOPIC N/A 2017    COLONOSCOPY  10/2007    adenoma xl Sara    COLONOSCOPY  16    polyp x2    DENTAL SURGERY      Teeth Extracted In Past    ENDOSCOPY, COLON, DIAGNOSTIC  13    gastritis    EYE SURGERY Bilateral     Cataracts With Lens Implants    KNEE SURGERY Left     TOENAIL EXCISION Right     Great Toe    VASECTOMY  1987     Family History   Problem Relation Age of Onset    Early Death Mother 47    Heart Disease Mother     Diabetes Father     Heart Attack Sister     Heart Disease Sister         Heart Attack    Other Sister         Polio    Early Death Brother 52        Heart Attack    Heart Attack Brother     Heart Disease Brother         Heart Attack    Diabetes Brother     Heart Attack Brother     Heart Disease Brother         Heart Attack    Diabetes Sister     Depression Sister      Social History     Tobacco Use    Smoking status: Former Smoker     Packs/day: 0.50     Years: 22.00     Pack years: 11.00     Types: Pipe, [de-identified], Cigarettes     Start date:      Quit date:      Years since quittin.1    Smokeless tobacco: Former User     Types: Snuff     Quit date:    Substance Use Topics    Alcohol use: Yes     Comment: \"Occ.  Beer Maybe Once A Month\"      [unfilled]  Review of Systems:   · Constitutional: No Fever or Weight Loss   · Eyes: No Decreased Vision  · ENT: No Headaches, Hearing Loss or Vertigo  · Cardiovascular: + chest pain, dyspnea on exertion, palpitations or loss of consciousness  · Respiratory: + cough or wheezing    · Gastrointestinal: No abdominal pain, appetite loss, blood in stools, constipation, diarrhea or heartburn  · Genitourinary: No dysuria, trouble voiding, or hematuria  · Musculoskeletal:  No gait disturbance, weakness or joint complaints  · Integumentary: No rash or pruritis  · Neurological: No TIA or stroke symptoms  · Psychiatric: No anxiety or depression  · Endocrine: No malaise, fatigue or temperature intolerance  · Hematologic/Lymphatic: No bleeding problems, blood clots or swollen lymph nodes  · Allergic/Immunologic: No nasal congestion or hives  All systems negative except as marked. Objective:  /64   Pulse 83   Ht 5' 7\" (1.702 m)   Wt 187 lb (84.8 kg)   BMI 29.29 kg/m²   Wt Readings from Last 3 Encounters:   02/16/22 187 lb (84.8 kg)   01/28/22 191 lb (86.6 kg)   01/25/22 191 lb (86.6 kg)     Body mass index is 29.29 kg/m². GENERAL - Alert, oriented, pleasant, in no apparent distress,normal grooming  HEENT  pupils are intact, cornea intact, conjunctive normal color, ears are normal in exam,  Neck - Supple. No jugular venous distention noted. No carotid bruits, no apical -carotid delay  Respiratory:  Normal breath sounds, No respiratory distress, No wheezing, No chest tenderness. ,no use of accessory muscles, diaphragm movement is normal  Cardiovascular: (PMI) apex non displaced,no lifts no thrills, no s3,no s4, Normal heart rate, Normal rhythm, No murmurs, No rubs, No gallops. Carotid arteries pulse and amplitude are normal no bruit, no abdominal bruit noted ( normal abdominal aorta ausculation),   Extremities - No cyanosis, clubbing, or significant edema, no varicose veins    Abdomen  No masses, tenderness, or organomegaly, no hepato-splenomegally, no bruits  Musculoskeletal  No significant edema, no kyphosis or scoliosis, no deformity in any extremity noted, muscle strength and tone are normal  Skin: no ulcer,no scar,no stasis dermatitis   Neurologic  alert oriented times 3,Cranial nerves II through XII are grossly intact. There were no gross focal neurologic abnormalities.    Psychiatric: normal mood and affect    No results found for: CKTOTAL, CKMB, CKMBINDEX, TROPONINI  BNP:  No results found for: BNP  PT/INR:  No results found for: PTINR  Lab Results   Component Value Date    LABA1C 7.8 11/23/2021    LABA1C 7.6 08/11/2021     Lab Results   Component Value Date    CHOL 129 11/23/2021 TRIG 174 (H) 11/23/2021    HDL 38 (L) 11/23/2021    LDLCALC 56 11/23/2021    LDLDIRECT 55 05/12/2020     Lab Results   Component Value Date    ALT 19 11/23/2021    AST 19 11/23/2021     TSH:  No results found for: TSH    Impression:  Arabella King is a 78 y. o.year old who  has a past medical history of CAD (coronary artery disease), Cerebral artery occlusion with cerebral infarction ([address] Chronic back pain, Diabetes mellitus ([address] Erectile dysfunction, Gastritis, Glaucoma, H/O cardiovascular stress test, H/O echocardiogram, H/O left heart catheterization by cutdown, Hx of echocardiogram, Hyperlipidemia, Hypertension, Polyp of gallbladder, PVD (peripheral vascular disease) ([address] Teeth missing, and Wears glasses. and presents with     Plan:  1. Chest pain: had covid 19 two weaks ago, will get stress test and echo  2. CAD and cardiomyopathy :had covid vaccine,  LHC showed mild LAD disease and ventriculogram showed normal LVEF.,Continue aspirin,continue statins, ACEinhibitors, betablockers, repeat echo this time showed normal LVEF, MilD septal wall hypokinesis ( ECHO in 2017 had LEF 35%, however cath LVEF was normal)  3. Obesity: initially lost 20 lbs, and then gained 10 lsb, and then lost 4.5 lbs on adipex, however had urinary retention as a side effect of adipex, and hence stopped it, recommend to try KENIA over the counter, and work up and diet. recommend 1200 calories/day  4. DM: on jardiance ( pays 47 dollars co payment), continue metformin and glipizide and acotos ( acotos can cause fluid retention)  5. Dyslipidemia: continue statins  6. HTN: stable, continue lopressor and lisinopril medications, recommend to recheck at home and call me, if blood pressure > 140/90, will increase lisinopril to 30mg daily  7. CKD: stable, Health maintenance: exeriseAll   All labs, medications and tests reviewed, continue all other medications of all above medical condition listed as is.     @[facility]@

## 2022-02-17 ENCOUNTER — OFFICE VISIT (OUTPATIENT)
Dept: INTERNAL MEDICINE CLINIC | Age: 77
End: 2022-02-17
Payer: MEDICARE

## 2022-02-17 ENCOUNTER — NURSE TRIAGE (OUTPATIENT)
Dept: OTHER | Facility: CLINIC | Age: 77
End: 2022-02-17

## 2022-02-17 VITALS
BODY MASS INDEX: 29.07 KG/M2 | HEART RATE: 72 BPM | WEIGHT: 185.6 LBS | DIASTOLIC BLOOD PRESSURE: 68 MMHG | OXYGEN SATURATION: 98 % | RESPIRATION RATE: 16 BRPM | SYSTOLIC BLOOD PRESSURE: 136 MMHG

## 2022-02-17 DIAGNOSIS — R10.84 GENERALIZED ABDOMINAL PAIN: Primary | ICD-10-CM

## 2022-02-17 PROCEDURE — 3017F COLORECTAL CA SCREEN DOC REV: CPT | Performed by: INTERNAL MEDICINE

## 2022-02-17 PROCEDURE — 1123F ACP DISCUSS/DSCN MKR DOCD: CPT | Performed by: INTERNAL MEDICINE

## 2022-02-17 PROCEDURE — 1036F TOBACCO NON-USER: CPT | Performed by: INTERNAL MEDICINE

## 2022-02-17 PROCEDURE — 99214 OFFICE O/P EST MOD 30 MIN: CPT | Performed by: INTERNAL MEDICINE

## 2022-02-17 PROCEDURE — G8417 CALC BMI ABV UP PARAM F/U: HCPCS | Performed by: INTERNAL MEDICINE

## 2022-02-17 PROCEDURE — 4040F PNEUMOC VAC/ADMIN/RCVD: CPT | Performed by: INTERNAL MEDICINE

## 2022-02-17 PROCEDURE — G8484 FLU IMMUNIZE NO ADMIN: HCPCS | Performed by: INTERNAL MEDICINE

## 2022-02-17 PROCEDURE — G8427 DOCREV CUR MEDS BY ELIG CLIN: HCPCS | Performed by: INTERNAL MEDICINE

## 2022-02-17 RX ORDER — DICYCLOMINE HYDROCHLORIDE 10 MG/1
10 CAPSULE ORAL 4 TIMES DAILY
Qty: 60 CAPSULE | Refills: 0 | Status: SHIPPED | OUTPATIENT
Start: 2022-02-17 | End: 2022-05-11

## 2022-02-17 RX ORDER — OMEPRAZOLE 20 MG/1
20 CAPSULE, DELAYED RELEASE ORAL DAILY
Qty: 30 CAPSULE | Refills: 0 | Status: SHIPPED | OUTPATIENT
Start: 2022-02-17 | End: 2022-05-11

## 2022-02-17 NOTE — PROGRESS NOTES
Tristen BENTON Ji  [id number]  02/17/22    SUBJECTIVE:    Pt woke this morning at 2 am with abdominal and back pain. Pain improved when he is sitting up, worse with lying. Pain is located in the epigastric area without radiation. He had a little diarrhea yesterday, but nl BM today. He feels his abd is gurgling today. He is belching. He is passing flatus. He denies N/V, blood in the stool, black stools, F/C, dysuria, hematuria, abd distension, HB, reflux. He used rolaids which helped his symptoms. OBJECTIVE:    /68   Pulse 72   Resp 16   Wt 185 lb 9.6 oz (84.2 kg)   SpO2 98%   BMI 29.07 kg/m²     Physical Exam  Constitutional:       Appearance: He is well-developed. Eyes:      General: No scleral icterus. Conjunctiva/sclera: Conjunctivae normal.   Neck:      Thyroid: No thyromegaly. Vascular: No JVD. Trachea: No tracheal deviation. Cardiovascular:      Rate and Rhythm: Normal rate and regular rhythm. Heart sounds: Normal heart sounds. Pulmonary:      Effort: Pulmonary effort is normal. No respiratory distress. Breath sounds: Normal breath sounds. Abdominal:      General: There is no distension. Palpations: Abdomen is soft. There is no mass. Tenderness: There is generalized abdominal tenderness. There is no guarding or rebound. Musculoskeletal:      Cervical back: Neck supple. Lymphadenopathy:      Cervical: No cervical adenopathy. Skin:     Nails: There is no clubbing. Neurological:      Mental Status: He is alert and oriented to person, place, and time. Comments: Nonfocal   Psychiatric:         Behavior: Behavior normal.         Judgment: Judgment normal.         ASSESSMENT:    1. Generalized abdominal pain        PLAN:    Erickson Anaya was seen today for abdominal pain. Diagnoses and all orders for this visit:    Generalized abdominal pain - exam today benign other tyhan mild tenderness.  DDx includes gastroenteritis, gastritis, GERD, partial SBO. WIll Tx with PPI and bentyl. If symptoms significantly worsen he will go to the ER  -     omeprazole (PRILOSEC) 20 MG delayed release capsule; Take 1 capsule by mouth daily  -     dicyclomine (BENTYL) 10 MG capsule;  Take 1 capsule by mouth 4 times daily

## 2022-02-24 ENCOUNTER — TELEPHONE (OUTPATIENT)
Dept: CARDIOLOGY CLINIC | Age: 77
End: 2022-02-24

## 2022-02-24 ENCOUNTER — PROCEDURE VISIT (OUTPATIENT)
Dept: CARDIOLOGY CLINIC | Age: 77
End: 2022-02-24
Payer: MEDICARE

## 2022-02-24 DIAGNOSIS — R07.9 CHEST PAIN, UNSPECIFIED TYPE: Primary | ICD-10-CM

## 2022-02-24 LAB
LV EF: 58 %
LVEF MODALITY: NORMAL

## 2022-02-24 PROCEDURE — 93306 TTE W/DOPPLER COMPLETE: CPT | Performed by: INTERNAL MEDICINE

## 2022-02-28 ENCOUNTER — TELEPHONE (OUTPATIENT)
Dept: CARDIOLOGY CLINIC | Age: 77
End: 2022-02-28

## 2022-03-01 ENCOUNTER — OFFICE VISIT (OUTPATIENT)
Dept: ORTHOPEDIC SURGERY | Age: 77
End: 2022-03-01

## 2022-03-01 VITALS
OXYGEN SATURATION: 99 % | BODY MASS INDEX: 29.03 KG/M2 | WEIGHT: 185 LBS | RESPIRATION RATE: 17 BRPM | HEART RATE: 75 BPM | HEIGHT: 67 IN

## 2022-03-01 DIAGNOSIS — S92.424A NONDISPLACED FRACTURE OF DISTAL PHALANX OF RIGHT GREAT TOE, INITIAL ENCOUNTER FOR CLOSED FRACTURE: Primary | ICD-10-CM

## 2022-03-01 RX ORDER — PREDNISONE 20 MG/1
TABLET ORAL
COMMUNITY
Start: 2022-02-26 | End: 2022-05-11 | Stop reason: ALTCHOICE

## 2022-03-01 RX ORDER — CEFDINIR 300 MG/1
CAPSULE ORAL
COMMUNITY
Start: 2022-02-26 | End: 2022-05-11 | Stop reason: ALTCHOICE

## 2022-03-01 RX ORDER — LORATADINE 10 MG/1
10 CAPSULE, LIQUID FILLED ORAL DAILY
COMMUNITY

## 2022-03-01 NOTE — PROGRESS NOTES
Patient returns to the office with a right greater toe fx, doi: 1/19/22. Pt stated overall he is doing much better. Pt stated he has no pain today. Pt stated he has good ROM and has not had any issues with the foot. Pt denies any new injuries or falls.

## 2022-03-01 NOTE — PROGRESS NOTES
3/1/2022   Chief Complaint   Patient presents with    Foot Injury     Right greater toe, fx DOI: 01/19/22        History of Present Illness:                             Arcadio Lewis is a 78 y.o. male who returns today for follow-up of his great toe fracture. He has done well activities. He denies any pain swelling or instability. He has been able to wean out of the protective shoe back into regular walking shoes. He is pleased with his progress and feels that he has resumed his normal activities. Patient returns to the office with a right greater toe fx, doi: 1/19/22. Pt stated overall he is doing much better. Pt stated he has no pain today. Pt stated he has good ROM and has not had any issues with the foot. Pt denies any new injuries or falls. Medical History  Patient's medications, allergies, past medical, surgical, social and family histories were reviewed and updated as appropriate. Review of Systems                                            Examination:  General Exam:  Vitals: Pulse 75   Resp 17   Ht 5' 7\" (1.702 m)   Wt 185 lb (83.9 kg)   SpO2 99%   BMI 28.98 kg/m²    Physical Exam   Right lower extremity:  No tenderness to palpation of the great toe. Painless active range of motion present across the MP and IP joints of the great toe. Sensation motor functions intact throughout the foot and great toe. No instability or soft tissue swelling at the great toe. Diagnostic testing:  X-rays reviewed in office, I independently reviewed the films in the office today:   XR FOOT RIGHT (MIN 3 VIEWS)    Result Date: 3/1/2022  3 views of the right foot show normal alignment of the great toe with progression of the healing process across the previous nondisplaced fracture of the distal phalanx. Normal alignment of the IP and MP joints of the great toe. Normal bone density. Stable mild degenerative changes of the first MTP joint.         Office Procedures:  No orders of the defined Patient arrives via triage for complaints of a vaginal abscess x 1 week. patient has hx of same and states it usually resolves itself.   Rates pain 8/10.    types were placed in this encounter. Assessment and Plan  1. Right great toe nondisplaced distal phalanx fracture    We reviewed the x-rays and his fracture is well-healed. He will continue with activities as tolerated. Continue to build up strength and range of motion as pain allows. Follow-up as needed if any questions or concerns.           Electronically signed by Donna Coffey MD on 3/1/2022 at 10:06 AM

## 2022-03-08 ENCOUNTER — PROCEDURE VISIT (OUTPATIENT)
Dept: CARDIOLOGY CLINIC | Age: 77
End: 2022-03-08
Payer: MEDICARE

## 2022-03-08 DIAGNOSIS — R07.9 CHEST PAIN, UNSPECIFIED TYPE: ICD-10-CM

## 2022-03-08 LAB
LV EF: 78 %
LVEF MODALITY: NORMAL

## 2022-03-08 PROCEDURE — 78452 HT MUSCLE IMAGE SPECT MULT: CPT | Performed by: INTERNAL MEDICINE

## 2022-03-08 PROCEDURE — A9500 TC99M SESTAMIBI: HCPCS | Performed by: INTERNAL MEDICINE

## 2022-03-08 PROCEDURE — 93018 CV STRESS TEST I&R ONLY: CPT | Performed by: INTERNAL MEDICINE

## 2022-03-08 PROCEDURE — 93017 CV STRESS TEST TRACING ONLY: CPT | Performed by: INTERNAL MEDICINE

## 2022-03-08 PROCEDURE — 93016 CV STRESS TEST SUPVJ ONLY: CPT | Performed by: INTERNAL MEDICINE

## 2022-03-11 ENCOUNTER — TELEPHONE (OUTPATIENT)
Dept: CARDIOLOGY CLINIC | Age: 77
End: 2022-03-11

## 2022-03-11 NOTE — TELEPHONE ENCOUNTER
Conclusions       Ronni Dakins physician Dr. Willie Villegas .    Normal LV function. normal EDV    There is normal isotope uptake following exercise and at rest. There is no    evidence of exercise induced ischemia.    This is a normal study.        Recommendation    Recommendation: Routine follow-up.      Left message on voice mail regarding results of stress test.

## 2022-03-28 DIAGNOSIS — E11.9 TYPE 2 DIABETES MELLITUS WITHOUT COMPLICATION, WITH LONG-TERM CURRENT USE OF INSULIN (HCC): ICD-10-CM

## 2022-03-28 DIAGNOSIS — Z79.4 TYPE 2 DIABETES MELLITUS WITHOUT COMPLICATION, WITH LONG-TERM CURRENT USE OF INSULIN (HCC): ICD-10-CM

## 2022-04-11 ENCOUNTER — TELEPHONE (OUTPATIENT)
Dept: INTERNAL MEDICINE CLINIC | Age: 77
End: 2022-04-11

## 2022-04-11 DIAGNOSIS — M54.50 CHRONIC BILATERAL LOW BACK PAIN WITHOUT SCIATICA: Primary | ICD-10-CM

## 2022-04-11 DIAGNOSIS — G89.29 CHRONIC BILATERAL LOW BACK PAIN WITHOUT SCIATICA: Primary | ICD-10-CM

## 2022-05-11 ENCOUNTER — OFFICE VISIT (OUTPATIENT)
Dept: INTERNAL MEDICINE CLINIC | Age: 77
End: 2022-05-11
Payer: MEDICARE

## 2022-05-11 VITALS
DIASTOLIC BLOOD PRESSURE: 62 MMHG | BODY MASS INDEX: 28.29 KG/M2 | WEIGHT: 180.6 LBS | SYSTOLIC BLOOD PRESSURE: 118 MMHG | RESPIRATION RATE: 14 BRPM | HEART RATE: 64 BPM | OXYGEN SATURATION: 97 %

## 2022-05-11 DIAGNOSIS — N18.30 CHRONIC RENAL INSUFFICIENCY, STAGE 3 (MODERATE) (HCC): ICD-10-CM

## 2022-05-11 DIAGNOSIS — I10 PRIMARY HYPERTENSION: ICD-10-CM

## 2022-05-11 DIAGNOSIS — E11.21 TYPE 2 DIABETES MELLITUS WITH DIABETIC NEPHROPATHY, WITHOUT LONG-TERM CURRENT USE OF INSULIN (HCC): ICD-10-CM

## 2022-05-11 DIAGNOSIS — E78.2 MIXED HYPERLIPIDEMIA: ICD-10-CM

## 2022-05-11 DIAGNOSIS — I42.0 DILATED CARDIOMYOPATHY (HCC): ICD-10-CM

## 2022-05-11 DIAGNOSIS — E11.21 TYPE 2 DIABETES MELLITUS WITH DIABETIC NEPHROPATHY, WITHOUT LONG-TERM CURRENT USE OF INSULIN (HCC): Primary | ICD-10-CM

## 2022-05-11 LAB
A/G RATIO: 1.5 (ref 1.1–2.2)
ALBUMIN SERPL-MCNC: 4.6 G/DL (ref 3.4–5)
ALP BLD-CCNC: 88 U/L (ref 40–129)
ALT SERPL-CCNC: 23 U/L (ref 10–40)
ANION GAP SERPL CALCULATED.3IONS-SCNC: 15 MMOL/L (ref 3–16)
AST SERPL-CCNC: 20 U/L (ref 15–37)
BASOPHILS ABSOLUTE: 0 K/UL (ref 0–0.2)
BASOPHILS RELATIVE PERCENT: 0.7 %
BILIRUB SERPL-MCNC: 0.5 MG/DL (ref 0–1)
BUN BLDV-MCNC: 55 MG/DL (ref 7–20)
CALCIUM SERPL-MCNC: 9.9 MG/DL (ref 8.3–10.6)
CHLORIDE BLD-SCNC: 104 MMOL/L (ref 99–110)
CHOLESTEROL, TOTAL: 133 MG/DL (ref 0–199)
CO2: 21 MMOL/L (ref 21–32)
CREAT SERPL-MCNC: 2.4 MG/DL (ref 0.8–1.3)
EOSINOPHILS ABSOLUTE: 0.1 K/UL (ref 0–0.6)
EOSINOPHILS RELATIVE PERCENT: 1.3 %
GFR AFRICAN AMERICAN: 32
GFR NON-AFRICAN AMERICAN: 27
GLUCOSE BLD-MCNC: 119 MG/DL (ref 70–99)
HCT VFR BLD CALC: 37.3 % (ref 40.5–52.5)
HDLC SERPL-MCNC: 36 MG/DL (ref 40–60)
HEMOGLOBIN: 12.2 G/DL (ref 13.5–17.5)
LDL CHOLESTEROL CALCULATED: 68 MG/DL
LYMPHOCYTES ABSOLUTE: 1.5 K/UL (ref 1–5.1)
LYMPHOCYTES RELATIVE PERCENT: 25.7 %
MCH RBC QN AUTO: 31.9 PG (ref 26–34)
MCHC RBC AUTO-ENTMCNC: 32.6 G/DL (ref 31–36)
MCV RBC AUTO: 97.8 FL (ref 80–100)
MONOCYTES ABSOLUTE: 0.7 K/UL (ref 0–1.3)
MONOCYTES RELATIVE PERCENT: 11.8 %
NEUTROPHILS ABSOLUTE: 3.4 K/UL (ref 1.7–7.7)
NEUTROPHILS RELATIVE PERCENT: 60.5 %
PDW BLD-RTO: 14.6 % (ref 12.4–15.4)
PLATELET # BLD: 182 K/UL (ref 135–450)
PMV BLD AUTO: 9.2 FL (ref 5–10.5)
POTASSIUM SERPL-SCNC: 5.8 MMOL/L (ref 3.5–5.1)
RBC # BLD: 3.81 M/UL (ref 4.2–5.9)
SODIUM BLD-SCNC: 140 MMOL/L (ref 136–145)
TOTAL PROTEIN: 7.6 G/DL (ref 6.4–8.2)
TRIGL SERPL-MCNC: 143 MG/DL (ref 0–150)
VLDLC SERPL CALC-MCNC: 29 MG/DL
WBC # BLD: 5.7 K/UL (ref 4–11)

## 2022-05-11 PROCEDURE — 1123F ACP DISCUSS/DSCN MKR DOCD: CPT | Performed by: INTERNAL MEDICINE

## 2022-05-11 PROCEDURE — 36415 COLL VENOUS BLD VENIPUNCTURE: CPT | Performed by: INTERNAL MEDICINE

## 2022-05-11 PROCEDURE — 99214 OFFICE O/P EST MOD 30 MIN: CPT | Performed by: INTERNAL MEDICINE

## 2022-05-11 PROCEDURE — G8427 DOCREV CUR MEDS BY ELIG CLIN: HCPCS | Performed by: INTERNAL MEDICINE

## 2022-05-11 PROCEDURE — 1036F TOBACCO NON-USER: CPT | Performed by: INTERNAL MEDICINE

## 2022-05-11 PROCEDURE — 3017F COLORECTAL CA SCREEN DOC REV: CPT | Performed by: INTERNAL MEDICINE

## 2022-05-11 PROCEDURE — G8417 CALC BMI ABV UP PARAM F/U: HCPCS | Performed by: INTERNAL MEDICINE

## 2022-05-11 PROCEDURE — 3046F HEMOGLOBIN A1C LEVEL >9.0%: CPT | Performed by: INTERNAL MEDICINE

## 2022-05-11 PROCEDURE — 4040F PNEUMOC VAC/ADMIN/RCVD: CPT | Performed by: INTERNAL MEDICINE

## 2022-05-11 PROCEDURE — 2022F DILAT RTA XM EVC RTNOPTHY: CPT | Performed by: INTERNAL MEDICINE

## 2022-05-11 NOTE — PROGRESS NOTES
Tristen Workman  [birth year]  05/11/22    SUBJECTIVE:    blood sugars has been in the 50s and 60s at night twice weekly. Sugars during the day 115. He has changed his eating habits and has lost 10 pounds. Pt will get epidural today with pavlatos - this is the first shot. The patient is taking hypertensive medications compliantly without side effects. Denies chest pain, dyspnea, edema, or TIA's. Patient's reflux well controlled on current medications used intermittently. Patient denies any blood in stool black stool, heartburn, reflux. Patient denies any chest pain, shortness of breath, myalgias, Patient is tolerating cholesterol medications without difficulty. OBJECTIVE:    /62   Pulse 64   Resp 14   Wt 180 lb 9.6 oz (81.9 kg)   SpO2 97%   BMI 28.29 kg/m²     Physical Exam  Constitutional:       Appearance: He is well-developed. Eyes:      General: No scleral icterus. Conjunctiva/sclera: Conjunctivae normal.   Neck:      Thyroid: No thyromegaly. Vascular: No JVD. Trachea: No tracheal deviation. Cardiovascular:      Rate and Rhythm: Normal rate and regular rhythm. Heart sounds: Normal heart sounds. Pulmonary:      Effort: Pulmonary effort is normal. No respiratory distress. Breath sounds: Normal breath sounds. Abdominal:      General: There is no distension. Palpations: Abdomen is soft. There is no mass. Tenderness: There is no abdominal tenderness. There is no guarding or rebound. Musculoskeletal:      Cervical back: Neck supple. Lymphadenopathy:      Cervical: No cervical adenopathy. Skin:     Nails: There is no clubbing. Neurological:      Mental Status: He is alert and oriented to person, place, and time. Comments: Nonfocal   Psychiatric:         Behavior: Behavior normal.         Judgment: Judgment normal.         ASSESSMENT:    1.  Type 2 diabetes mellitus with diabetic nephropathy, without long-term current use of insulin ([facility]    2. Primary hypertension    3. Chronic renal insufficiency, stage 3 (moderate) (HCC)    4. Dilated cardiomyopathy ([facility]    5. Mixed hyperlipidemia        PLAN:    Naun Lopez was seen today for 6 month follow-up and blood sugar problem. Diagnoses and all orders for this visit:    Type 2 diabetes mellitus with diabetic nephropathy, without long-term current use of insulin ([facility] - stop actos; check labs  -     Hemoglobin A1C; Future  -     Lipid Panel; Future  -     CBC with Auto Differential; Future  -     Comprehensive Metabolic Panel; Future    Primary hypertension - at goal; check labs  -     Hemoglobin A1C; Future  -     Lipid Panel; Future  -     CBC with Auto Differential; Future  -     Comprehensive Metabolic Panel; Future    Chronic renal insufficiency, stage 3 (moderate) (HCC) - cont jardiance and lisinopril; check labs  -     Hemoglobin A1C; Future  -     Lipid Panel; Future  -     CBC with Auto Differential; Future  -     Comprehensive Metabolic Panel; Future    Dilated cardiomyopathy (HCC) - on ACE-I, BB    Mixed hyperlipidemia - cont statin, chck labs  -     Lipid Panel; Future  -     Comprehensive Metabolic Panel;  Future

## 2022-05-12 LAB
ESTIMATED AVERAGE GLUCOSE: 162.8 MG/DL
HBA1C MFR BLD: 7.3 %

## 2022-05-13 DIAGNOSIS — N28.9 WORSENING RENAL FUNCTION: Primary | ICD-10-CM

## 2022-05-17 DIAGNOSIS — N28.9 WORSENING RENAL FUNCTION: ICD-10-CM

## 2022-05-17 LAB
ANION GAP SERPL CALCULATED.3IONS-SCNC: 19 MMOL/L (ref 3–16)
BUN BLDV-MCNC: 58 MG/DL (ref 7–20)
CALCIUM SERPL-MCNC: 9.3 MG/DL (ref 8.3–10.6)
CHLORIDE BLD-SCNC: 106 MMOL/L (ref 99–110)
CO2: 14 MMOL/L (ref 21–32)
CREAT SERPL-MCNC: 1.9 MG/DL (ref 0.8–1.3)
GFR AFRICAN AMERICAN: 42
GFR NON-AFRICAN AMERICAN: 35
GLUCOSE BLD-MCNC: 217 MG/DL (ref 70–99)
POTASSIUM SERPL-SCNC: 5 MMOL/L (ref 3.5–5.1)
SODIUM BLD-SCNC: 139 MMOL/L (ref 136–145)

## 2022-05-17 PROCEDURE — 36415 COLL VENOUS BLD VENIPUNCTURE: CPT | Performed by: INTERNAL MEDICINE

## 2022-05-18 DIAGNOSIS — E87.20 ACIDOSIS: Primary | ICD-10-CM

## 2022-05-23 DIAGNOSIS — I10 ESSENTIAL HYPERTENSION: ICD-10-CM

## 2022-05-23 RX ORDER — METOPROLOL TARTRATE 50 MG/1
TABLET, FILM COATED ORAL
Qty: 180 TABLET | Refills: 0 | Status: SHIPPED | OUTPATIENT
Start: 2022-05-23 | End: 2022-09-01 | Stop reason: SDUPTHER

## 2022-05-23 RX ORDER — LISINOPRIL 20 MG/1
TABLET ORAL
Qty: 90 TABLET | Refills: 0 | Status: SHIPPED | OUTPATIENT
Start: 2022-05-23 | End: 2022-06-29

## 2022-05-24 DIAGNOSIS — E87.20 ACIDOSIS: ICD-10-CM

## 2022-05-24 LAB
ANION GAP SERPL CALCULATED.3IONS-SCNC: 13 MMOL/L (ref 3–16)
BUN BLDV-MCNC: 32 MG/DL (ref 7–20)
CALCIUM SERPL-MCNC: 9.3 MG/DL (ref 8.3–10.6)
CHLORIDE BLD-SCNC: 108 MMOL/L (ref 99–110)
CO2: 18 MMOL/L (ref 21–32)
CREAT SERPL-MCNC: 1.5 MG/DL (ref 0.8–1.3)
GFR AFRICAN AMERICAN: 55
GFR NON-AFRICAN AMERICAN: 46
GLUCOSE BLD-MCNC: 118 MG/DL (ref 70–99)
POTASSIUM SERPL-SCNC: 5.4 MMOL/L (ref 3.5–5.1)
SODIUM BLD-SCNC: 139 MMOL/L (ref 136–145)

## 2022-05-24 PROCEDURE — 36415 COLL VENOUS BLD VENIPUNCTURE: CPT | Performed by: INTERNAL MEDICINE

## 2022-06-06 DIAGNOSIS — J01.11 ACUTE RECURRENT FRONTAL SINUSITIS: ICD-10-CM

## 2022-06-06 RX ORDER — FLUTICASONE PROPIONATE 50 MCG
SPRAY, SUSPENSION (ML) NASAL
Qty: 16 G | Refills: 5 | Status: SHIPPED | OUTPATIENT
Start: 2022-06-06

## 2022-06-29 DIAGNOSIS — E11.9 TYPE 2 DIABETES MELLITUS WITHOUT COMPLICATION, WITH LONG-TERM CURRENT USE OF INSULIN (HCC): ICD-10-CM

## 2022-06-29 DIAGNOSIS — Z79.4 TYPE 2 DIABETES MELLITUS WITHOUT COMPLICATION, WITH LONG-TERM CURRENT USE OF INSULIN (HCC): ICD-10-CM

## 2022-06-29 RX ORDER — LISINOPRIL 20 MG/1
TABLET ORAL
Qty: 90 TABLET | Refills: 0 | Status: SHIPPED | OUTPATIENT
Start: 2022-06-29

## 2022-07-08 NOTE — PROGRESS NOTES
This addendum has been created to correct a medical record clerical error, wherein an erroneous  was selected for your administered flu vaccine .
monofilament). Comments: Nonfocal   Psychiatric:         Behavior: Behavior normal.         Judgment: Judgment normal.         ASSESSMENT:    1. Type 2 diabetes mellitus with diabetic nephropathy, without long-term current use of insulin ([address]    2. Needs flu shot    3. Primary hypertension    4. Mixed hyperlipidemia    5. Chronic renal insufficiency, stage 3 (moderate) (HCC)        PLAN:    Yomaira Rodriguez was seen today for 3 month follow-up and flu vaccine. Diagnoses and all orders for this visit:    Type 2 diabetes mellitus with diabetic nephropathy, without long-term current use of insulin ([address] - check labs; foot exam with diminished pulses but no symptoms of PAD so will watch for now  -     Hemoglobin A1C; Future  -      DIABETES FOOT EXAM  -     Comprehensive Metabolic Panel; Future  -     Microalbumin / Creatinine Urine Ratio; Future    Needs flu shot  -     INFLUENZA, HIGH-DOSE, QUADV, 72 YRS +, IM, PF, 0.7ML (FLUZONE)    Primary hypertension - check labs  -     Lipid Panel; Future  -     CBC Auto Differential; Future  -     Comprehensive Metabolic Panel; Future    Mixed hyperlipidemia - check labs, cont statin  -     Lipid Panel; Future  -     Comprehensive Metabolic Panel; Future    Chronic renal insufficiency, stage 3 (moderate) (HCC) - check labs; on lisinopril and jardiance  -     Lipid Panel;  Future    Other orders  -     Cancel: INFLUENZA, HIGH-DOSE, QUADV, 65 YRS +, IM, PF, 0.7ML (FLUZONE)

## 2022-09-01 DIAGNOSIS — I10 ESSENTIAL HYPERTENSION: ICD-10-CM

## 2022-09-01 RX ORDER — METOPROLOL TARTRATE 50 MG/1
TABLET, FILM COATED ORAL
Qty: 180 TABLET | Refills: 0 | Status: SHIPPED | OUTPATIENT
Start: 2022-09-01

## 2022-09-26 DIAGNOSIS — Z79.4 TYPE 2 DIABETES MELLITUS WITHOUT COMPLICATION, WITH LONG-TERM CURRENT USE OF INSULIN (HCC): ICD-10-CM

## 2022-09-26 DIAGNOSIS — E11.9 TYPE 2 DIABETES MELLITUS WITHOUT COMPLICATION, WITH LONG-TERM CURRENT USE OF INSULIN (HCC): ICD-10-CM

## 2022-10-05 ENCOUNTER — OFFICE VISIT (OUTPATIENT)
Dept: CARDIOLOGY CLINIC | Age: 77
End: 2022-10-05
Payer: MEDICARE

## 2022-10-05 VITALS
WEIGHT: 179 LBS | BODY MASS INDEX: 28.77 KG/M2 | HEIGHT: 66 IN | SYSTOLIC BLOOD PRESSURE: 124 MMHG | DIASTOLIC BLOOD PRESSURE: 76 MMHG | HEART RATE: 70 BPM

## 2022-10-05 DIAGNOSIS — R07.9 CHEST PAIN, UNSPECIFIED TYPE: Primary | ICD-10-CM

## 2022-10-05 DIAGNOSIS — I25.10 CORONARY ARTERY DISEASE INVOLVING NATIVE CORONARY ARTERY OF NATIVE HEART WITHOUT ANGINA PECTORIS: ICD-10-CM

## 2022-10-05 PROCEDURE — G8484 FLU IMMUNIZE NO ADMIN: HCPCS | Performed by: INTERNAL MEDICINE

## 2022-10-05 PROCEDURE — G8427 DOCREV CUR MEDS BY ELIG CLIN: HCPCS | Performed by: INTERNAL MEDICINE

## 2022-10-05 PROCEDURE — 1036F TOBACCO NON-USER: CPT | Performed by: INTERNAL MEDICINE

## 2022-10-05 PROCEDURE — 1123F ACP DISCUSS/DSCN MKR DOCD: CPT | Performed by: INTERNAL MEDICINE

## 2022-10-05 PROCEDURE — G8417 CALC BMI ABV UP PARAM F/U: HCPCS | Performed by: INTERNAL MEDICINE

## 2022-10-05 PROCEDURE — 99214 OFFICE O/P EST MOD 30 MIN: CPT | Performed by: INTERNAL MEDICINE

## 2022-10-05 PROCEDURE — 3017F COLORECTAL CA SCREEN DOC REV: CPT | Performed by: INTERNAL MEDICINE

## 2022-10-05 NOTE — PROGRESS NOTES
Juan Carlos Power MD        OFFICE  FOLLOWUP NOTE    Chief complaints: patient is here for management of  CAD, DM, HTN, dyspnea, DYSLPIDEMIA, CKD and obesity, covid 19    Subjective: patient feels better, no chest pain, no shortness of breath, no dizziness, no palpitations    HPI Rony Whitfield is a 78 y. o.year old who  has a past medical history of CAD (coronary artery disease), Cerebral artery occlusion with cerebral infarction ([address] Chronic back pain, Diabetes mellitus ([address] Erectile dysfunction, Gastritis, Glaucoma, H/O cardiovascular stress test, H/O Doppler echocardiogram, H/O echocardiogram, H/O left heart catheterization by cutdown, Hx of cardiovascular stress test, Hx of echocardiogram, Hyperlipidemia, Hypertension, Polyp of gallbladder, PVD (peripheral vascular disease) ([address] Teeth missing, and Wears glasses.  and presents for management of  CAD, DM, HTN, dyspnea, DYSLPIDEMIA, CKD and obesity, covid 19 which are well controlled      Current Outpatient Medications   Medication Sig Dispense Refill    metFORMIN (GLUCOPHAGE) 500 MG tablet Take 1 tablet by mouth 2 times daily (with meals) 360 tablet 1    metoprolol tartrate (LOPRESSOR) 50 MG tablet Take 1 tablet by mouth twice daily 180 tablet 0    lisinopril (PRINIVIL;ZESTRIL) 20 MG tablet Take 1 tablet by mouth once daily 90 tablet 0    fluticasone (FLONASE) 50 MCG/ACT nasal spray Use 2 spray(s) in each nostril once daily 16 g 5    loratadine (CLARITIN) 10 MG capsule Take 10 mg by mouth daily      atorvastatin (LIPITOR) 80 MG tablet TAKE 1 TABLET BY MOUTH AT BEDTIME 90 tablet 3    glipiZIDE (GLUCOTROL) 10 MG tablet Take 1 tablet by mouth 2 times daily 180 tablet 3    timolol (TIMOPTIC) 0.5 % ophthalmic solution INSTILL 1 DROP INTO EACH EYE IN THE MORNING      SOFT TOUCH LANCETS MISC Test three times daily or prn for abnormal glucose 100 each 3    Blood Glucose Monitoring Suppl (ACCU-CHEK ARYAN PLUS) w/Device KIT Test three times daily or prn for abnormal glucose 1 kit 0    latanoprost (XALATAN) 0.005 % ophthalmic solution Place 1 drop into both eyes nightly 1 Bottle 6    aspirin 325 MG EC tablet Take 325 mg by mouth nightly Over The Counter      omeprazole (PRILOSEC) 20 MG delayed release capsule Take 1 capsule by mouth daily 30 capsule 0    dicyclomine (BENTYL) 10 MG capsule Take 1 capsule by mouth 4 times daily 60 capsule 0    blood glucose test strips (ACCU-CHEK ARYAN PLUS) strip 1 each by In Vitro route 2 times daily Or as needed. 300 each 3     No current facility-administered medications for this visit. Allergies: Penicillins, Sulfa antibiotics, and Lyrica [pregabalin]  Past Medical History:   Diagnosis Date    CAD (coronary artery disease)     Sees Dr. Iris Nuñez    Cerebral artery occlusion with cerebral infarction [facility]) Dx 1990's    \"TIA's\" No Residual     Chronic back pain     Diabetes mellitus ([facility] Dx 2002    Erectile dysfunction     Gastritis     4/13 EGD - mild gastritis    Glaucoma     Bilateral Eyes    H/O cardiovascular stress test 05/25/2012 2012, Ragini, EF 54 %, Normal pattern of perfusion in all regions. H/O Doppler echocardiogram 02/24/2022    EF 55-60. Grade 1 diastolic dysfunction. Hypokinetic motion of mid ateroseptal wall noted in LV. Mitral annular calcification present. H/O echocardiogram 2009,2005 2009, EF 45-50%, Trace MR & TR    H/O left heart catheterization by cutdown 08/2015    Hx of cardiovascular stress test 03/09/2022    Normal study    Hx of echocardiogram 02/10/2021    EF 50-55%. No significant valvular disease noted. Hyperlipidemia     5/12 Stress WNL, EF 54%; 8/11 - Stress Test WNL, EF 58%    Hypertension     Polyp of gallbladder     PVD (peripheral vascular disease) ([facility] 08/10/2016    [facility] sent copy of Bilat Lower Arterial US, scanned into Epic. Mild PVD .     Teeth missing     Upper And Lower    Wears glasses     To Read     Past Surgical History:   Procedure Laterality Date    CARDIAC CATHETERIZATION  Last Done 2017    CARPAL TUNNEL RELEASE Bilateral 1979    Done At Separate Times    CHOLECYSTECTOMY, LAPAROSCOPIC N/A 2017    COLONOSCOPY  10/2007    adenoma xl Sara    COLONOSCOPY  16    polyp x2    DENTAL SURGERY      Teeth Extracted In Past    ENDOSCOPY, COLON, DIAGNOSTIC  13    gastritis    EYE SURGERY Bilateral     Cataracts With Lens Implants    KNEE SURGERY Left     TOENAIL EXCISION Right     Great Toe    VASECTOMY  1987     Family History   Problem Relation Age of Onset    Early Death Mother 47    Heart Disease Mother     Diabetes Father     Heart Attack Sister     Heart Disease Sister         Heart Attack    Other Sister         Polio    Early Death Brother 52        Heart Attack    Heart Attack Brother     Heart Disease Brother         Heart Attack    Diabetes Brother     Heart Attack Brother     Heart Disease Brother         Heart Attack    Diabetes Sister     Depression Sister      Social History     Tobacco Use    Smoking status: Former     Packs/day: 0.50     Years: 22.00     Pack years: 11.00     Types: Pipe, Cigars, Cigarettes     Start date:      Quit date:      Years since quittin.7    Smokeless tobacco: Former     Types: Snuff     Quit date:    Substance Use Topics    Alcohol use: Yes     Comment: \"Occ.  Beer Maybe Once A Month\"      [unfilled]  Review of Systems:   Constitutional: No Fever or Weight Loss   Eyes: No Decreased Vision  ENT: No Headaches, Hearing Loss or Vertigo  Cardiovascular: No chest pain, dyspnea on exertion, palpitations or loss of consciousness  Respiratory: No cough or wheezing    Gastrointestinal: No abdominal pain, appetite loss, blood in stools, constipation, diarrhea or heartburn  Genitourinary: No dysuria, trouble voiding, or hematuria  Musculoskeletal:  No gait disturbance, weakness or joint complaints  Integumentary: No rash or pruritis  Neurological: No TIA or stroke symptoms  Psychiatric: No anxiety or depression  Endocrine: No malaise, fatigue or temperature intolerance  Hematologic/Lymphatic: No bleeding problems, blood clots or swollen lymph nodes  Allergic/Immunologic: No nasal congestion or hives  All systems negative except as marked. Objective:  /76   Pulse 70   Ht 5' 6\" (1.676 m)   Wt 179 lb (81.2 kg)   BMI 28.89 kg/m²   Wt Readings from Last 3 Encounters:   10/05/22 179 lb (81.2 kg)   05/11/22 180 lb 9.6 oz (81.9 kg)   03/01/22 185 lb (83.9 kg)     Body mass index is 28.89 kg/m². GENERAL - Alert, oriented, pleasant, in no apparent distress,normal grooming  HEENT - pupils are intact, cornea intact, conjunctive normal color, ears are normal in exam,  Neck - Supple. No jugular venous distention noted. No carotid bruits, no apical -carotid delay  Respiratory:  Normal breath sounds, No respiratory distress, No wheezing, No chest tenderness. ,no use of accessory muscles, diaphragm movement is normal  Cardiovascular: (PMI) apex non displaced,no lifts no thrills, no s3,no s4, Normal heart rate, Normal rhythm, No murmurs, No rubs, No gallops. Carotid arteries pulse and amplitude are normal no bruit, no abdominal bruit noted ( normal abdominal aorta ausculation),   Extremities - No cyanosis, clubbing, or significant edema, no varicose veins    Abdomen - No masses, tenderness, or organomegaly, no hepato-splenomegally, no bruits  Musculoskeletal - No significant edema, no kyphosis or scoliosis, no deformity in any extremity noted, muscle strength and tone are normal  Skin: no ulcer,no scar,no stasis dermatitis   Neurologic - alert oriented times 3,Cranial nerves II through XII are grossly intact. There were no gross focal neurologic abnormalities.    Psychiatric: normal mood and affect    No results found for: CKTOTAL, CKMB, CKMBINDEX, TROPONINI  BNP:  No results found for: BNP  PT/INR:  No results found for: PTINR  Lab Results   Component Value Date    LABA1C 7.3 05/11/2022    LABA1C 7.8 11/23/2021 Lab Results   Component Value Date    CHOL 133 05/11/2022    TRIG 143 05/11/2022    HDL 36 (L) 05/11/2022    LDLCALC 68 05/11/2022    LDLDIRECT 55 05/12/2020     Lab Results   Component Value Date    ALT 23 05/11/2022    AST 20 05/11/2022     TSH:  No results found for: TSH    Impression:  Siddharth Dash is a 78 y. o.year old who  has a past medical history of CAD (coronary artery disease), Cerebral artery occlusion with cerebral infarction ([address] Chronic back pain, Diabetes mellitus ([address] Erectile dysfunction, Gastritis, Glaucoma, H/O cardiovascular stress test, H/O Doppler echocardiogram, H/O echocardiogram, H/O left heart catheterization by cutdown, Hx of cardiovascular stress test, Hx of echocardiogram, Hyperlipidemia, Hypertension, Polyp of gallbladder, PVD (peripheral vascular disease) ([address] Teeth missing, and Wears glasses. and presents with     Plan:  Chest pain:RESOLVED, had covid 19 last time, had normal stress test and echo, will not recommend LHC  CAD and cardiomyopathy :had covid vaccine,  LHC showed mild LAD disease and ventriculogram showed normal LVEF.,Continue aspirin,continue statins, ACEinhibitors, betablockers, repeat echo this time showed normal LVEF, MilD septal wall hypokinesis ( ECHO in 2017 had LEF 35%, however cath LVEF was normal AND recent echo was normal also)  Obesity: initially lost 20 lbs, and then gained 10 lsb, and then lost 4.5 lbs on adipex, however had urinary retention as a side effect of adipex, and hence stopped it, recommend to try KENIA over the counter, and work up and diet. recommend 1200 calories/day  DM: on jardiance ( pays 47 dollars co payment), continue metformin and glipizide and acotos ( acotos can cause fluid retention)  Dyslipidemia: continue statins  HTN: stable, continue lopressor and lisinopril medications, recommend to recheck at home and call me, if blood pressure > 140/90, will increase lisinopril to 30mg daily  CKD: stable,   Health maintenance: exeriseAll All labs, medications and tests reviewed, continue all other medications of all above medical condition listed as is.     @[facility]@

## 2022-10-19 DIAGNOSIS — E11.9 TYPE 2 DIABETES MELLITUS WITHOUT COMPLICATION, WITH LONG-TERM CURRENT USE OF INSULIN (HCC): ICD-10-CM

## 2022-10-19 DIAGNOSIS — Z79.4 TYPE 2 DIABETES MELLITUS WITHOUT COMPLICATION, WITH LONG-TERM CURRENT USE OF INSULIN (HCC): ICD-10-CM

## 2022-10-19 RX ORDER — LANCETS
1 EACH MISCELLANEOUS 2 TIMES DAILY
Qty: 200 EACH | Refills: 3 | Status: SHIPPED | OUTPATIENT
Start: 2022-10-19

## 2022-10-19 RX ORDER — BLOOD-GLUCOSE METER
EACH MISCELLANEOUS
Qty: 1 KIT | Refills: 0 | Status: SHIPPED | OUTPATIENT
Start: 2022-10-19

## 2022-10-19 RX ORDER — BLOOD SUGAR DIAGNOSTIC
1 STRIP MISCELLANEOUS 2 TIMES DAILY
Qty: 200 EACH | Refills: 3 | Status: SHIPPED | OUTPATIENT
Start: 2022-10-19 | End: 2023-01-17

## 2022-10-25 RX ORDER — BLOOD GLUCOSE CONTROL HIGH,LOW
EACH MISCELLANEOUS
OUTPATIENT
Start: 2022-10-25

## 2022-10-25 RX ORDER — ISOPROPYL ALCOHOL 0.75 G/1
SWAB TOPICAL
Refills: 0 | OUTPATIENT
Start: 2022-10-25

## 2022-11-11 ENCOUNTER — OFFICE VISIT (OUTPATIENT)
Dept: INTERNAL MEDICINE CLINIC | Age: 77
End: 2022-11-11
Payer: MEDICARE

## 2022-11-11 VITALS
DIASTOLIC BLOOD PRESSURE: 68 MMHG | OXYGEN SATURATION: 98 % | HEART RATE: 64 BPM | WEIGHT: 175.6 LBS | SYSTOLIC BLOOD PRESSURE: 134 MMHG | RESPIRATION RATE: 14 BRPM | BODY MASS INDEX: 28.34 KG/M2

## 2022-11-11 DIAGNOSIS — E78.2 MIXED HYPERLIPIDEMIA: ICD-10-CM

## 2022-11-11 DIAGNOSIS — I10 ESSENTIAL HYPERTENSION: ICD-10-CM

## 2022-11-11 DIAGNOSIS — E11.9 TYPE 2 DIABETES MELLITUS WITHOUT COMPLICATION, WITH LONG-TERM CURRENT USE OF INSULIN (HCC): ICD-10-CM

## 2022-11-11 DIAGNOSIS — N18.30 CHRONIC RENAL INSUFFICIENCY, STAGE 3 (MODERATE) (HCC): Primary | ICD-10-CM

## 2022-11-11 DIAGNOSIS — N18.30 CHRONIC RENAL INSUFFICIENCY, STAGE 3 (MODERATE) (HCC): ICD-10-CM

## 2022-11-11 DIAGNOSIS — Z79.4 TYPE 2 DIABETES MELLITUS WITHOUT COMPLICATION, WITH LONG-TERM CURRENT USE OF INSULIN (HCC): ICD-10-CM

## 2022-11-11 LAB
A/G RATIO: 1.9 (ref 1.1–2.2)
ALBUMIN SERPL-MCNC: 4.6 G/DL (ref 3.4–5)
ALP BLD-CCNC: 104 U/L (ref 40–129)
ALT SERPL-CCNC: 29 U/L (ref 10–40)
ANION GAP SERPL CALCULATED.3IONS-SCNC: 14 MMOL/L (ref 3–16)
AST SERPL-CCNC: 21 U/L (ref 15–37)
BASOPHILS ABSOLUTE: 0.1 K/UL (ref 0–0.2)
BASOPHILS RELATIVE PERCENT: 0.9 %
BILIRUB SERPL-MCNC: 0.5 MG/DL (ref 0–1)
BUN BLDV-MCNC: 35 MG/DL (ref 7–20)
CALCIUM SERPL-MCNC: 9.4 MG/DL (ref 8.3–10.6)
CHLORIDE BLD-SCNC: 107 MMOL/L (ref 99–110)
CHOLESTEROL, TOTAL: 131 MG/DL (ref 0–199)
CO2: 19 MMOL/L (ref 21–32)
CREAT SERPL-MCNC: 1.6 MG/DL (ref 0.8–1.3)
CREATININE URINE: 76 MG/DL (ref 39–259)
EOSINOPHILS ABSOLUTE: 0.1 K/UL (ref 0–0.6)
EOSINOPHILS RELATIVE PERCENT: 1.5 %
GFR SERPL CREATININE-BSD FRML MDRD: 44 ML/MIN/{1.73_M2}
GLUCOSE BLD-MCNC: 179 MG/DL (ref 70–99)
HCT VFR BLD CALC: 38.5 % (ref 40.5–52.5)
HDLC SERPL-MCNC: 33 MG/DL (ref 40–60)
HEMOGLOBIN: 12.7 G/DL (ref 13.5–17.5)
LDL CHOLESTEROL CALCULATED: 74 MG/DL
LYMPHOCYTES ABSOLUTE: 1.4 K/UL (ref 1–5.1)
LYMPHOCYTES RELATIVE PERCENT: 24.1 %
MCH RBC QN AUTO: 31.8 PG (ref 26–34)
MCHC RBC AUTO-ENTMCNC: 33.1 G/DL (ref 31–36)
MCV RBC AUTO: 96.3 FL (ref 80–100)
MICROALBUMIN UR-MCNC: <1.2 MG/DL
MICROALBUMIN/CREAT UR-RTO: NORMAL MG/G (ref 0–30)
MONOCYTES ABSOLUTE: 0.6 K/UL (ref 0–1.3)
MONOCYTES RELATIVE PERCENT: 10.8 %
NEUTROPHILS ABSOLUTE: 3.6 K/UL (ref 1.7–7.7)
NEUTROPHILS RELATIVE PERCENT: 62.7 %
PDW BLD-RTO: 13.9 % (ref 12.4–15.4)
PLATELET # BLD: 147 K/UL (ref 135–450)
PMV BLD AUTO: 9.5 FL (ref 5–10.5)
POTASSIUM SERPL-SCNC: 5.4 MMOL/L (ref 3.5–5.1)
RBC # BLD: 4 M/UL (ref 4.2–5.9)
SODIUM BLD-SCNC: 140 MMOL/L (ref 136–145)
TOTAL PROTEIN: 7 G/DL (ref 6.4–8.2)
TRIGL SERPL-MCNC: 118 MG/DL (ref 0–150)
VLDLC SERPL CALC-MCNC: 24 MG/DL
WBC # BLD: 5.8 K/UL (ref 4–11)

## 2022-11-11 PROCEDURE — 90694 VACC AIIV4 NO PRSRV 0.5ML IM: CPT | Performed by: INTERNAL MEDICINE

## 2022-11-11 PROCEDURE — 3051F HG A1C>EQUAL 7.0%<8.0%: CPT | Performed by: INTERNAL MEDICINE

## 2022-11-11 PROCEDURE — 1036F TOBACCO NON-USER: CPT | Performed by: INTERNAL MEDICINE

## 2022-11-11 PROCEDURE — 3078F DIAST BP <80 MM HG: CPT | Performed by: INTERNAL MEDICINE

## 2022-11-11 PROCEDURE — 3074F SYST BP LT 130 MM HG: CPT | Performed by: INTERNAL MEDICINE

## 2022-11-11 PROCEDURE — 36415 COLL VENOUS BLD VENIPUNCTURE: CPT | Performed by: INTERNAL MEDICINE

## 2022-11-11 PROCEDURE — G8484 FLU IMMUNIZE NO ADMIN: HCPCS | Performed by: INTERNAL MEDICINE

## 2022-11-11 PROCEDURE — 1123F ACP DISCUSS/DSCN MKR DOCD: CPT | Performed by: INTERNAL MEDICINE

## 2022-11-11 PROCEDURE — 99214 OFFICE O/P EST MOD 30 MIN: CPT | Performed by: INTERNAL MEDICINE

## 2022-11-11 PROCEDURE — G0008 ADMIN INFLUENZA VIRUS VAC: HCPCS | Performed by: INTERNAL MEDICINE

## 2022-11-11 PROCEDURE — 2022F DILAT RTA XM EVC RTNOPTHY: CPT | Performed by: INTERNAL MEDICINE

## 2022-11-11 PROCEDURE — G8417 CALC BMI ABV UP PARAM F/U: HCPCS | Performed by: INTERNAL MEDICINE

## 2022-11-11 PROCEDURE — 3017F COLORECTAL CA SCREEN DOC REV: CPT | Performed by: INTERNAL MEDICINE

## 2022-11-11 PROCEDURE — G8427 DOCREV CUR MEDS BY ELIG CLIN: HCPCS | Performed by: INTERNAL MEDICINE

## 2022-11-11 RX ORDER — METOPROLOL TARTRATE 50 MG/1
50 TABLET, FILM COATED ORAL 2 TIMES DAILY
Qty: 180 TABLET | Refills: 3 | Status: SHIPPED | OUTPATIENT
Start: 2022-11-11

## 2022-11-11 RX ORDER — GLIPIZIDE 10 MG/1
10 TABLET ORAL 2 TIMES DAILY
Qty: 180 TABLET | Refills: 3 | Status: SHIPPED | OUTPATIENT
Start: 2022-11-11

## 2022-11-11 RX ORDER — LISINOPRIL 20 MG/1
20 TABLET ORAL DAILY
Qty: 90 TABLET | Refills: 3 | Status: SHIPPED | OUTPATIENT
Start: 2022-11-11

## 2022-11-11 RX ORDER — ATORVASTATIN CALCIUM 80 MG/1
TABLET, FILM COATED ORAL
Qty: 90 TABLET | Refills: 3 | Status: SHIPPED | OUTPATIENT
Start: 2022-11-11

## 2022-11-11 SDOH — ECONOMIC STABILITY: FOOD INSECURITY: WITHIN THE PAST 12 MONTHS, YOU WORRIED THAT YOUR FOOD WOULD RUN OUT BEFORE YOU GOT MONEY TO BUY MORE.: NEVER TRUE

## 2022-11-11 SDOH — ECONOMIC STABILITY: FOOD INSECURITY: WITHIN THE PAST 12 MONTHS, THE FOOD YOU BOUGHT JUST DIDN'T LAST AND YOU DIDN'T HAVE MONEY TO GET MORE.: NEVER TRUE

## 2022-11-11 ASSESSMENT — PATIENT HEALTH QUESTIONNAIRE - PHQ9
SUM OF ALL RESPONSES TO PHQ QUESTIONS 1-9: 0
SUM OF ALL RESPONSES TO PHQ QUESTIONS 1-9: 0
1. LITTLE INTEREST OR PLEASURE IN DOING THINGS: 0
2. FEELING DOWN, DEPRESSED OR HOPELESS: 0
SUM OF ALL RESPONSES TO PHQ9 QUESTIONS 1 & 2: 0
SUM OF ALL RESPONSES TO PHQ QUESTIONS 1-9: 0
SUM OF ALL RESPONSES TO PHQ QUESTIONS 1-9: 0

## 2022-11-11 ASSESSMENT — SOCIAL DETERMINANTS OF HEALTH (SDOH): HOW HARD IS IT FOR YOU TO PAY FOR THE VERY BASICS LIKE FOOD, HOUSING, MEDICAL CARE, AND HEATING?: NOT HARD AT ALL

## 2022-11-11 NOTE — PROGRESS NOTES
Addison Carty  [birth year]  11/11/22    SUBJECTIVE:    \"I am doing very well. \" Pt has lost weight. He is working 2 days weekly, which is physically demanding. Patient compliant with medications for diabetes. Blood sugars have averaged around 120-13, with pt checking blood sugar 2 time daily. Patient has had no episodes of significant hypoglycemia. The patient is taking hypertensive medications compliantly without side effects. Denies chest pain, dyspnea, edema, or TIAs    Patient denies any chest pain, shortness of breath, myalgias, Patient is tolerating cholesterol medications without difficulty. OBJECTIVE:    /68   Pulse 64   Resp 14   Wt 175 lb 9.6 oz (79.7 kg)   SpO2 98%   BMI 28.34 kg/m²     Physical Exam  Constitutional:       Appearance: He is well-developed. Eyes:      General: No scleral icterus. Conjunctiva/sclera: Conjunctivae normal.   Neck:      Thyroid: No thyromegaly. Vascular: No JVD. Trachea: No tracheal deviation. Cardiovascular:      Rate and Rhythm: Normal rate and regular rhythm. Pulses:           Dorsalis pedis pulses are 1+ on the right side and 1+ on the left side. Posterior tibial pulses are 1+ on the right side and 1+ on the left side. Heart sounds: Normal heart sounds. Pulmonary:      Effort: Pulmonary effort is normal. No respiratory distress. Breath sounds: Normal breath sounds. Abdominal:      General: There is no distension. Palpations: Abdomen is soft. There is no mass. Tenderness: There is no abdominal tenderness. There is no guarding or rebound. Musculoskeletal:      Cervical back: Neck supple. Lymphadenopathy:      Cervical: No cervical adenopathy. Skin:     General: Skin is warm and dry. Nails: There is no clubbing. Comments: No foot ulcerations   Neurological:      Mental Status: He is alert and oriented to person, place, and time. Sensory: No sensory deficit (to monofilament). Comments: Nonfocal   Psychiatric:         Behavior: Behavior normal.         Judgment: Judgment normal.       ASSESSMENT:    1. Chronic renal insufficiency, stage 3 (moderate) ([facility])    2. Type 2 diabetes mellitus without complication, with long-term current use of insulin ([facility]    3. Essential hypertension    4. Mixed hyperlipidemia        PLAN:    Abhay Hernandez was seen today for 6 month follow-up, medication refill and flu vaccine. Diagnoses and all orders for this visit:    Chronic renal insufficiency, stage 3 (moderate) ([facility]) - check labs, cont ACE-I  -     lisinopril (PRINIVIL;ZESTRIL) 20 MG tablet; Take 1 tablet by mouth daily  -     Lipid Panel; Future  -     Comprehensive Metabolic Panel; Future    Type 2 diabetes mellitus without complication, with long-term current use of insulin ([facility]) - foot exam OK; check labs  -     metFORMIN (GLUCOPHAGE) 500 MG tablet; Take 1 tablet by mouth 2 times daily (with meals)  -     glipiZIDE (GLUCOTROL) 10 MG tablet; Take 1 tablet by mouth 2 times daily  -     Hemoglobin A1C; Future  -     Lipid Panel; Future  -      DIABETES FOOT EXAM  -     Comprehensive Metabolic Panel; Future  -     Microalbumin / Creatinine Urine Ratio; Future    Essential hypertension  - at goal; check labs  -     metoprolol tartrate (LOPRESSOR) 50 MG tablet; Take 1 tablet by mouth 2 times daily Take 1 tablet by mouth twice daily  -     lisinopril (PRINIVIL;ZESTRIL) 20 MG tablet; Take 1 tablet by mouth daily  -     Lipid Panel; Future  -     CBC with Auto Differential; Future  -     Comprehensive Metabolic Panel; Future    Mixed hyperlipidemia - no change; check labs  -     atorvastatin (LIPITOR) 80 MG tablet; TAKE 1 TABLET BY MOUTH AT BEDTIME  -     Lipid Panel; Future  -     Comprehensive Metabolic Panel;  Future    Other orders  -     Influenza, FLUAD, (age 72 y+), IM, Preservative Free, 0.5 mL    Pt declines c-scope, covid booster

## 2022-11-12 LAB
ESTIMATED AVERAGE GLUCOSE: 171.4 MG/DL
HBA1C MFR BLD: 7.6 %

## 2023-01-13 RX ORDER — BLOOD SUGAR DIAGNOSTIC
1 STRIP MISCELLANEOUS 2 TIMES DAILY
Qty: 200 EACH | Refills: 3 | Status: CANCELLED | OUTPATIENT
Start: 2023-01-13 | End: 2023-04-13

## 2023-01-13 RX ORDER — BLOOD SUGAR DIAGNOSTIC
1 STRIP MISCELLANEOUS 2 TIMES DAILY
Qty: 200 EACH | Refills: 3 | Status: SHIPPED | OUTPATIENT
Start: 2023-01-13

## 2023-01-27 ENCOUNTER — OFFICE VISIT (OUTPATIENT)
Dept: INTERNAL MEDICINE CLINIC | Age: 78
End: 2023-01-27
Payer: MEDICARE

## 2023-01-27 VITALS
RESPIRATION RATE: 14 BRPM | DIASTOLIC BLOOD PRESSURE: 64 MMHG | WEIGHT: 176.6 LBS | SYSTOLIC BLOOD PRESSURE: 116 MMHG | HEART RATE: 58 BPM | BODY MASS INDEX: 28.5 KG/M2 | OXYGEN SATURATION: 99 %

## 2023-01-27 DIAGNOSIS — H69.83 DYSFUNCTION OF BOTH EUSTACHIAN TUBES: Primary | ICD-10-CM

## 2023-01-27 PROCEDURE — 99213 OFFICE O/P EST LOW 20 MIN: CPT | Performed by: INTERNAL MEDICINE

## 2023-01-27 PROCEDURE — 3078F DIAST BP <80 MM HG: CPT | Performed by: INTERNAL MEDICINE

## 2023-01-27 PROCEDURE — 1123F ACP DISCUSS/DSCN MKR DOCD: CPT | Performed by: INTERNAL MEDICINE

## 2023-01-27 PROCEDURE — 3074F SYST BP LT 130 MM HG: CPT | Performed by: INTERNAL MEDICINE

## 2023-01-27 ASSESSMENT — PATIENT HEALTH QUESTIONNAIRE - PHQ9
SUM OF ALL RESPONSES TO PHQ QUESTIONS 1-9: 0
SUM OF ALL RESPONSES TO PHQ QUESTIONS 1-9: 0
SUM OF ALL RESPONSES TO PHQ9 QUESTIONS 1 & 2: 0
1. LITTLE INTEREST OR PLEASURE IN DOING THINGS: 0
2. FEELING DOWN, DEPRESSED OR HOPELESS: 0
SUM OF ALL RESPONSES TO PHQ QUESTIONS 1-9: 0
SUM OF ALL RESPONSES TO PHQ QUESTIONS 1-9: 0

## 2023-02-07 NOTE — PROGRESS NOTES
Tristen BENTON Ji  1945 01/27/23    SUBJECTIVE:      Wednesday pt felt unsteady with his ambulation when he woke. He denies vertigo, presyncope, N/V, N/W, F/C, CP,SOB, rhinorrhea, ear pain, discharge. This occurred yesterday and again this morning. He has also had a frontal headache, nasal congestion and ringing in his ears.     OBJECTIVE:    /64   Pulse 58   Resp 14   Wt 176 lb 9.6 oz (80.1 kg)   SpO2 99%   BMI 28.50 kg/m²     Physical Exam  Constitutional:       Appearance: He is well-developed.   HENT:      Right Ear: External ear normal.      Left Ear: External ear normal.      Nose: Nose normal.      Mouth/Throat:      Pharynx: No oropharyngeal exudate.   Eyes:      Conjunctiva/sclera: Conjunctivae normal.   Cardiovascular:      Rate and Rhythm: Normal rate and regular rhythm.      Heart sounds: Normal heart sounds.   Pulmonary:      Effort: Pulmonary effort is normal.      Breath sounds: Normal breath sounds.   Lymphadenopathy:      Cervical: No cervical adenopathy.   Neurological:      Mental Status: He is alert and oriented to person, place, and time.      Cranial Nerves: No cranial nerve deficit.      Sensory: No sensory deficit.       ASSESSMENT:    1. Dysfunction of both eustachian tubes        PLAN:    Tristen was seen today for dizziness, headache and tinnitus.    Diagnoses and all orders for this visit:    Dysfunction of both eustachian tubes - exam (-), including neuro exam. I think symptoms are form eustacian tube dysfxn, and will Tx with flonase, claritin, and gum chewing.       
I personally performed the service described in the documentation recorded by the scribe in my presence, and it accurately and completely records my words and actions.

## 2023-04-05 ENCOUNTER — OFFICE VISIT (OUTPATIENT)
Dept: CARDIOLOGY CLINIC | Age: 78
End: 2023-04-05
Payer: MEDICARE

## 2023-04-05 VITALS
OXYGEN SATURATION: 99 % | HEART RATE: 57 BPM | HEIGHT: 66 IN | DIASTOLIC BLOOD PRESSURE: 74 MMHG | WEIGHT: 176.6 LBS | BODY MASS INDEX: 28.38 KG/M2 | SYSTOLIC BLOOD PRESSURE: 126 MMHG

## 2023-04-05 DIAGNOSIS — I10 ESSENTIAL HYPERTENSION: ICD-10-CM

## 2023-04-05 DIAGNOSIS — I25.10 CORONARY ARTERY DISEASE INVOLVING NATIVE CORONARY ARTERY OF NATIVE HEART WITHOUT ANGINA PECTORIS: Primary | ICD-10-CM

## 2023-04-05 DIAGNOSIS — I73.9 PAD (PERIPHERAL ARTERY DISEASE) (HCC): ICD-10-CM

## 2023-04-05 DIAGNOSIS — I42.0 DILATED CARDIOMYOPATHY (HCC): ICD-10-CM

## 2023-04-05 DIAGNOSIS — I10 PRIMARY HYPERTENSION: ICD-10-CM

## 2023-04-05 PROCEDURE — 3078F DIAST BP <80 MM HG: CPT | Performed by: INTERNAL MEDICINE

## 2023-04-05 PROCEDURE — 1123F ACP DISCUSS/DSCN MKR DOCD: CPT | Performed by: INTERNAL MEDICINE

## 2023-04-05 PROCEDURE — 99214 OFFICE O/P EST MOD 30 MIN: CPT | Performed by: INTERNAL MEDICINE

## 2023-04-05 PROCEDURE — 3074F SYST BP LT 130 MM HG: CPT | Performed by: INTERNAL MEDICINE

## 2023-04-05 PROCEDURE — 93000 ELECTROCARDIOGRAM COMPLETE: CPT | Performed by: INTERNAL MEDICINE

## 2023-04-05 RX ORDER — METOPROLOL TARTRATE 50 MG/1
25 TABLET, FILM COATED ORAL 2 TIMES DAILY
Qty: 180 TABLET | Refills: 3
Start: 2023-04-05

## 2023-04-05 NOTE — PROGRESS NOTES
weakness or joint complaints  Integumentary: No rash or pruritis  Neurological: No TIA or stroke symptoms  Psychiatric: No anxiety or depression  Endocrine: No malaise, fatigue or temperature intolerance  Hematologic/Lymphatic: No bleeding problems, blood clots or swollen lymph nodes  Allergic/Immunologic: No nasal congestion or hives  All systems negative except as marked. Objective:  /74   Pulse 57   Ht 5' 6\" (1.676 m)   Wt 176 lb 9.6 oz (80.1 kg)   SpO2 99%   BMI 28.50 kg/m²   Wt Readings from Last 3 Encounters:   04/05/23 176 lb 9.6 oz (80.1 kg)   01/27/23 176 lb 9.6 oz (80.1 kg)   11/11/22 175 lb 9.6 oz (79.7 kg)     Body mass index is 28.5 kg/m². GENERAL - Alert, oriented, pleasant, in no apparent distress,normal grooming  HEENT - pupils are intact, cornea intact, conjunctive normal color, ears are normal in exam,  Neck - Supple. No jugular venous distention noted. No carotid bruits, no apical -carotid delay  Respiratory:  Normal breath sounds, No respiratory distress, No wheezing, No chest tenderness. ,no use of accessory muscles, diaphragm movement is normal  Cardiovascular: (PMI) apex non displaced,no lifts no thrills, no s3,no s4, Normal heart rate, Normal rhythm, No murmurs, No rubs, No gallops. Carotid arteries pulse and amplitude are normal no bruit, no abdominal bruit noted ( normal abdominal aorta ausculation),   Extremities - No cyanosis, clubbing, or significant edema, no varicose veins    Abdomen - No masses, tenderness, or organomegaly, no hepato-splenomegally, no bruits  Musculoskeletal - No significant edema, no kyphosis or scoliosis, no deformity in any extremity noted, muscle strength and tone are normal  Skin: no ulcer,no scar,no stasis dermatitis   Neurologic - alert oriented times 3,Cranial nerves II through XII are grossly intact. There were no gross focal neurologic abnormalities.    Psychiatric: normal mood and affect    No results found for: CKTOTAL, CKMB, CKMBINDEX,

## 2023-05-11 ENCOUNTER — OFFICE VISIT (OUTPATIENT)
Dept: INTERNAL MEDICINE CLINIC | Age: 78
End: 2023-05-11
Payer: MEDICARE

## 2023-05-11 VITALS
RESPIRATION RATE: 14 BRPM | OXYGEN SATURATION: 98 % | DIASTOLIC BLOOD PRESSURE: 80 MMHG | WEIGHT: 174.8 LBS | BODY MASS INDEX: 28.21 KG/M2 | SYSTOLIC BLOOD PRESSURE: 158 MMHG | HEART RATE: 66 BPM

## 2023-05-11 DIAGNOSIS — N18.30 CHRONIC RENAL INSUFFICIENCY, STAGE 3 (MODERATE) (HCC): ICD-10-CM

## 2023-05-11 DIAGNOSIS — E11.21 TYPE 2 DIABETES MELLITUS WITH DIABETIC NEPHROPATHY, WITHOUT LONG-TERM CURRENT USE OF INSULIN (HCC): ICD-10-CM

## 2023-05-11 DIAGNOSIS — E78.2 MIXED HYPERLIPIDEMIA: ICD-10-CM

## 2023-05-11 DIAGNOSIS — E78.2 MIXED HYPERLIPIDEMIA: Primary | ICD-10-CM

## 2023-05-11 DIAGNOSIS — I10 ESSENTIAL HYPERTENSION: ICD-10-CM

## 2023-05-11 DIAGNOSIS — I42.0 DILATED CARDIOMYOPATHY (HCC): ICD-10-CM

## 2023-05-11 PROCEDURE — 3077F SYST BP >= 140 MM HG: CPT | Performed by: INTERNAL MEDICINE

## 2023-05-11 PROCEDURE — 36415 COLL VENOUS BLD VENIPUNCTURE: CPT | Performed by: INTERNAL MEDICINE

## 2023-05-11 PROCEDURE — 1123F ACP DISCUSS/DSCN MKR DOCD: CPT | Performed by: INTERNAL MEDICINE

## 2023-05-11 PROCEDURE — 3079F DIAST BP 80-89 MM HG: CPT | Performed by: INTERNAL MEDICINE

## 2023-05-11 PROCEDURE — 99214 OFFICE O/P EST MOD 30 MIN: CPT | Performed by: INTERNAL MEDICINE

## 2023-05-11 RX ORDER — METOPROLOL SUCCINATE 50 MG/1
50 TABLET, EXTENDED RELEASE ORAL DAILY
Qty: 90 TABLET | Refills: 1
Start: 2023-05-11

## 2023-05-11 RX ORDER — GLIPIZIDE 10 MG/1
10 TABLET ORAL
COMMUNITY

## 2023-05-11 NOTE — PROGRESS NOTES
Tristen Workman  [id number]  05/11/23    SUBJECTIVE:    In February pt slipped on the ice and fell. His left knee bent under him and he has swelling. He went to ortho and was started on steroids. Since then he has had more difficulties with his glucose. Evening sugars have been 180s; earlier in the day sugars are 120-130. Patient denies any chest pain, shortness of breath, myalgias, Patient is tolerating cholesterol medications without difficulty. The patient is taking hypertensive medications compliantly without side effects. Denies chest pain, dyspnea, edema, or TIA's. Blood pressure has been 132/67. He denies any leg pain. He has known PAD, is on statin, ASA. OBJECTIVE:    BP (!) 158/80   Pulse 66   Resp 14   Wt 174 lb 12.8 oz (79.3 kg)   SpO2 98%   BMI 28.21 kg/m²     Physical Exam  Constitutional:       Appearance: He is well-developed. Eyes:      General: No scleral icterus. Conjunctiva/sclera: Conjunctivae normal.   Neck:      Thyroid: No thyromegaly. Vascular: No JVD. Trachea: No tracheal deviation. Cardiovascular:      Rate and Rhythm: Normal rate and regular rhythm. Heart sounds: Normal heart sounds. Pulmonary:      Effort: Pulmonary effort is normal. No respiratory distress. Breath sounds: Normal breath sounds. Abdominal:      General: There is no distension. Palpations: Abdomen is soft. There is no mass. Tenderness: There is no abdominal tenderness. There is no guarding or rebound. Musculoskeletal:      Cervical back: Neck supple. Lymphadenopathy:      Cervical: No cervical adenopathy. Skin:     Nails: There is no clubbing. Neurological:      Mental Status: He is alert and oriented to person, place, and time. Comments: Nonfocal   Psychiatric:         Behavior: Behavior normal.         Judgment: Judgment normal.       ASSESSMENT:    1. Mixed hyperlipidemia    2.  Type 2 diabetes mellitus with diabetic nephropathy, without

## 2023-05-15 LAB
ALBUMIN SERPL-MCNC: 4.4 G/DL (ref 3.4–5)
ALBUMIN/GLOB SERPL: 1.7 {RATIO} (ref 1.1–2.2)
ALP SERPL-CCNC: 105 U/L (ref 40–129)
ALT SERPL-CCNC: 24 U/L (ref 10–40)
ANION GAP SERPL CALCULATED.3IONS-SCNC: 11 MMOL/L (ref 3–16)
AST SERPL-CCNC: 17 U/L (ref 15–37)
BASOPHILS # BLD: 0 K/UL (ref 0–0.2)
BASOPHILS NFR BLD: 0.4 %
BILIRUB SERPL-MCNC: 0.4 MG/DL (ref 0–1)
BUN SERPL-MCNC: 35 MG/DL (ref 7–20)
CALCIUM SERPL-MCNC: 9.5 MG/DL (ref 8.3–10.6)
CHLORIDE SERPL-SCNC: 107 MMOL/L (ref 99–110)
CHOLEST SERPL-MCNC: 132 MG/DL (ref 0–199)
CO2 SERPL-SCNC: 22 MMOL/L (ref 21–32)
CREAT SERPL-MCNC: 1.4 MG/DL (ref 0.8–1.3)
DEPRECATED RDW RBC AUTO: 14.4 % (ref 12.4–15.4)
EOSINOPHIL # BLD: 0.1 K/UL (ref 0–0.6)
EOSINOPHIL NFR BLD: 1.6 %
GFR SERPLBLD CREATININE-BSD FMLA CKD-EPI: 52 ML/MIN/{1.73_M2}
GLUCOSE SERPL-MCNC: 204 MG/DL (ref 70–99)
HCT VFR BLD AUTO: 37.8 % (ref 40.5–52.5)
HDLC SERPL-MCNC: 40 MG/DL (ref 40–60)
HGB BLD-MCNC: 12.5 G/DL (ref 13.5–17.5)
LDLC SERPL CALC-MCNC: 70 MG/DL
LYMPHOCYTES # BLD: 1.5 K/UL (ref 1–5.1)
LYMPHOCYTES NFR BLD: 23.7 %
MCH RBC QN AUTO: 33.1 PG (ref 26–34)
MCHC RBC AUTO-ENTMCNC: 33.1 G/DL (ref 31–36)
MCV RBC AUTO: 99.9 FL (ref 80–100)
MONOCYTES # BLD: 0.6 K/UL (ref 0–1.3)
MONOCYTES NFR BLD: 8.8 %
NEUTROPHILS # BLD: 4.3 K/UL (ref 1.7–7.7)
NEUTROPHILS NFR BLD: 65.5 %
PLATELET # BLD AUTO: 143 K/UL (ref 135–450)
PMV BLD AUTO: 9.8 FL (ref 5–10.5)
POTASSIUM SERPL-SCNC: 5.2 MMOL/L (ref 3.5–5.1)
PROT SERPL-MCNC: 7 G/DL (ref 6.4–8.2)
RBC # BLD AUTO: 3.79 M/UL (ref 4.2–5.9)
SODIUM SERPL-SCNC: 140 MMOL/L (ref 136–145)
TRIGL SERPL-MCNC: 112 MG/DL (ref 0–150)
VLDLC SERPL CALC-MCNC: 22 MG/DL
WBC # BLD AUTO: 6.5 K/UL (ref 4–11)

## 2023-05-16 LAB
EST. AVERAGE GLUCOSE BLD GHB EST-MCNC: 191.5 MG/DL
HBA1C MFR BLD: 8.3 %

## 2023-07-05 ENCOUNTER — OFFICE VISIT (OUTPATIENT)
Dept: CARDIOLOGY CLINIC | Age: 78
End: 2023-07-05
Payer: MEDICARE

## 2023-07-05 VITALS
SYSTOLIC BLOOD PRESSURE: 140 MMHG | OXYGEN SATURATION: 96 % | HEIGHT: 66 IN | DIASTOLIC BLOOD PRESSURE: 60 MMHG | HEART RATE: 63 BPM | WEIGHT: 175.8 LBS | RESPIRATION RATE: 16 BRPM | BODY MASS INDEX: 28.25 KG/M2

## 2023-07-05 DIAGNOSIS — N18.30 CHRONIC RENAL INSUFFICIENCY, STAGE 3 (MODERATE) (HCC): ICD-10-CM

## 2023-07-05 DIAGNOSIS — I10 ESSENTIAL HYPERTENSION: ICD-10-CM

## 2023-07-05 PROBLEM — E11.9 TYPE 2 DIABETES MELLITUS (HCC): Status: ACTIVE | Noted: 2023-07-05

## 2023-07-05 PROCEDURE — 3078F DIAST BP <80 MM HG: CPT | Performed by: INTERNAL MEDICINE

## 2023-07-05 PROCEDURE — 99214 OFFICE O/P EST MOD 30 MIN: CPT | Performed by: INTERNAL MEDICINE

## 2023-07-05 PROCEDURE — 3077F SYST BP >= 140 MM HG: CPT | Performed by: INTERNAL MEDICINE

## 2023-07-05 PROCEDURE — 1123F ACP DISCUSS/DSCN MKR DOCD: CPT | Performed by: INTERNAL MEDICINE

## 2023-07-05 RX ORDER — LISINOPRIL 30 MG/1
30 TABLET ORAL DAILY
Qty: 90 TABLET | Refills: 3 | Status: SHIPPED | OUTPATIENT
Start: 2023-07-05

## 2023-07-05 NOTE — PROGRESS NOTES
Lita Zamorano MD        OFFICE  FOLLOWUP NOTE    Chief complaints: patient is here for management of  CAD, DM, HTN, dyspnea, DYSLPIDEMIA, CKD and obesity, covid 19    Subjective: patient feels better, no chest pain, no shortness of breath, no dizziness, no palpitations    HPI Ortega Kraft is a 70 y. o.year old who  has a past medical history of CAD (coronary artery disease), Cerebral artery occlusion with cerebral infarction ([address]), Chronic back pain, Diabetes mellitus ([address]), Erectile dysfunction, Gastritis, Glaucoma, H/O cardiovascular stress test, H/O Doppler echocardiogram, H/O echocardiogram, H/O left heart catheterization by cutdown, Hx of cardiovascular stress test, Hx of echocardiogram, Hyperlipidemia, Hypertension, Polyp of gallbladder, PVD (peripheral vascular disease) ([address]), Teeth missing, and Wears glasses. and presents for management of  CAD, DM, HTN, dyspnea, DYSLPIDEMIA, CKD and obesity, covid 19 which are well controlled      Current Outpatient Medications   Medication Sig Dispense Refill    glipiZIDE (GLUCOTROL) 10 MG tablet Take 1 tablet by mouth 2 times daily (before meals)      blood glucose test strips (ACCU-CHEK GUIDE) strip 1 each by In Vitro route 2 times daily As needed.  200 each 3    metFORMIN (GLUCOPHAGE) 500 MG tablet Take 1 tablet by mouth 2 times daily (with meals) 360 tablet 3    lisinopril (PRINIVIL;ZESTRIL) 20 MG tablet Take 1 tablet by mouth daily 90 tablet 3    atorvastatin (LIPITOR) 80 MG tablet TAKE 1 TABLET BY MOUTH AT BEDTIME 90 tablet 3    Blood Glucose Monitoring Suppl (ACCU-CHEK ARYAN PLUS) w/Device KIT Test three times daily or prn for abnormal glucose 1 kit 0    Accu-Chek Softclix Lancets MISC 1 Device by Does not apply route in the morning and at bedtime 200 each 3    fluticasone (FLONASE) 50 MCG/ACT nasal spray Use 2 spray(s) in each nostril once daily 16 g 5    timolol (TIMOPTIC) 0.5 % ophthalmic solution INSTILL 1 DROP INTO EACH EYE IN THE MORNING      SOFT

## 2023-07-06 ENCOUNTER — TELEPHONE (OUTPATIENT)
Dept: INTERNAL MEDICINE CLINIC | Age: 78
End: 2023-07-06

## 2023-07-06 NOTE — TELEPHONE ENCOUNTER
Patient called states he seen Dr Tal Nieto yesterday. States his bp was 140/60 after several cups of coffee. States Dr Tal Nieto wants to increase his Metoprolol from 20mg to 30mg. Patient states his bp at home this morning is 121/67 last evening it was 118/66. Patient doesn't feel like he should increase because bp at home is always normal. He would like your thoughts on this. Please advise. Thank you!

## 2023-09-21 ENCOUNTER — OFFICE VISIT (OUTPATIENT)
Dept: INTERNAL MEDICINE CLINIC | Age: 78
End: 2023-09-21
Payer: MEDICARE

## 2023-09-21 VITALS
WEIGHT: 177.2 LBS | DIASTOLIC BLOOD PRESSURE: 64 MMHG | SYSTOLIC BLOOD PRESSURE: 110 MMHG | OXYGEN SATURATION: 98 % | RESPIRATION RATE: 14 BRPM | HEART RATE: 64 BPM | BODY MASS INDEX: 28.6 KG/M2

## 2023-09-21 DIAGNOSIS — I10 ESSENTIAL HYPERTENSION: ICD-10-CM

## 2023-09-21 DIAGNOSIS — N18.30 CHRONIC RENAL INSUFFICIENCY, STAGE 3 (MODERATE) (HCC): ICD-10-CM

## 2023-09-21 DIAGNOSIS — E78.2 MIXED HYPERLIPIDEMIA: ICD-10-CM

## 2023-09-21 DIAGNOSIS — Z79.4 TYPE 2 DIABETES MELLITUS WITHOUT COMPLICATION, WITH LONG-TERM CURRENT USE OF INSULIN (HCC): ICD-10-CM

## 2023-09-21 DIAGNOSIS — E11.9 TYPE 2 DIABETES MELLITUS WITHOUT COMPLICATION, WITH LONG-TERM CURRENT USE OF INSULIN (HCC): ICD-10-CM

## 2023-09-21 DIAGNOSIS — M79.671 RIGHT FOOT PAIN: Primary | ICD-10-CM

## 2023-09-21 LAB
ALBUMIN SERPL-MCNC: 4.2 G/DL (ref 3.4–5)
ALBUMIN/GLOB SERPL: 1.7 {RATIO} (ref 1.1–2.2)
ALP SERPL-CCNC: 96 U/L (ref 40–129)
ALT SERPL-CCNC: 29 U/L (ref 10–40)
ANION GAP SERPL CALCULATED.3IONS-SCNC: 13 MMOL/L (ref 3–16)
AST SERPL-CCNC: 21 U/L (ref 15–37)
BASOPHILS # BLD: 0 K/UL (ref 0–0.2)
BASOPHILS NFR BLD: 0.3 %
BILIRUB SERPL-MCNC: 0.6 MG/DL (ref 0–1)
BUN SERPL-MCNC: 23 MG/DL (ref 7–20)
CALCIUM SERPL-MCNC: 8.9 MG/DL (ref 8.3–10.6)
CHLORIDE SERPL-SCNC: 104 MMOL/L (ref 99–110)
CHOLEST SERPL-MCNC: 115 MG/DL (ref 0–199)
CO2 SERPL-SCNC: 24 MMOL/L (ref 21–32)
CREAT SERPL-MCNC: 1.7 MG/DL (ref 0.8–1.3)
CREAT UR-MCNC: 94.1 MG/DL (ref 39–259)
DEPRECATED RDW RBC AUTO: 14.5 % (ref 12.4–15.4)
EOSINOPHIL # BLD: 0.1 K/UL (ref 0–0.6)
EOSINOPHIL NFR BLD: 0.9 %
GFR SERPLBLD CREATININE-BSD FMLA CKD-EPI: 41 ML/MIN/{1.73_M2}
GLUCOSE SERPL-MCNC: 249 MG/DL (ref 70–99)
HCT VFR BLD AUTO: 37.3 % (ref 40.5–52.5)
HDLC SERPL-MCNC: 35 MG/DL (ref 40–60)
HGB BLD-MCNC: 12.4 G/DL (ref 13.5–17.5)
LDLC SERPL CALC-MCNC: 58 MG/DL
LYMPHOCYTES # BLD: 1.4 K/UL (ref 1–5.1)
LYMPHOCYTES NFR BLD: 19.4 %
MCH RBC QN AUTO: 31.5 PG (ref 26–34)
MCHC RBC AUTO-ENTMCNC: 33.3 G/DL (ref 31–36)
MCV RBC AUTO: 94.5 FL (ref 80–100)
MICROALBUMIN UR DL<=1MG/L-MCNC: 2.8 MG/DL
MICROALBUMIN/CREAT UR: 29.8 MG/G (ref 0–30)
MONOCYTES # BLD: 0.6 K/UL (ref 0–1.3)
MONOCYTES NFR BLD: 8.1 %
NEUTROPHILS # BLD: 5.1 K/UL (ref 1.7–7.7)
NEUTROPHILS NFR BLD: 71.3 %
PLATELET # BLD AUTO: 179 K/UL (ref 135–450)
PMV BLD AUTO: 9.7 FL (ref 5–10.5)
POTASSIUM SERPL-SCNC: 5.3 MMOL/L (ref 3.5–5.1)
PROT SERPL-MCNC: 6.7 G/DL (ref 6.4–8.2)
RBC # BLD AUTO: 3.95 M/UL (ref 4.2–5.9)
SODIUM SERPL-SCNC: 141 MMOL/L (ref 136–145)
TRIGL SERPL-MCNC: 112 MG/DL (ref 0–150)
VLDLC SERPL CALC-MCNC: 22 MG/DL
WBC # BLD AUTO: 7.1 K/UL (ref 4–11)

## 2023-09-21 PROCEDURE — 3052F HG A1C>EQUAL 8.0%<EQUAL 9.0%: CPT | Performed by: INTERNAL MEDICINE

## 2023-09-21 PROCEDURE — 3078F DIAST BP <80 MM HG: CPT | Performed by: INTERNAL MEDICINE

## 2023-09-21 PROCEDURE — 36415 COLL VENOUS BLD VENIPUNCTURE: CPT | Performed by: INTERNAL MEDICINE

## 2023-09-21 PROCEDURE — 1123F ACP DISCUSS/DSCN MKR DOCD: CPT | Performed by: INTERNAL MEDICINE

## 2023-09-21 PROCEDURE — 3074F SYST BP LT 130 MM HG: CPT | Performed by: INTERNAL MEDICINE

## 2023-09-21 PROCEDURE — 99214 OFFICE O/P EST MOD 30 MIN: CPT | Performed by: INTERNAL MEDICINE

## 2023-09-21 RX ORDER — BLOOD-GLUCOSE METER
EACH MISCELLANEOUS
Qty: 1 KIT | Refills: 0 | Status: SHIPPED | OUTPATIENT
Start: 2023-09-21

## 2023-09-21 RX ORDER — LISINOPRIL 30 MG/1
30 TABLET ORAL DAILY
Qty: 90 TABLET | Refills: 3 | Status: SHIPPED | OUTPATIENT
Start: 2023-09-21

## 2023-09-21 RX ORDER — LANCETS
1 EACH MISCELLANEOUS 2 TIMES DAILY
Qty: 200 EACH | Refills: 3 | Status: SHIPPED | OUTPATIENT
Start: 2023-09-21

## 2023-09-21 RX ORDER — DICYCLOMINE HYDROCHLORIDE 10 MG/1
10 CAPSULE ORAL 4 TIMES DAILY
Qty: 60 CAPSULE | Refills: 3 | Status: SHIPPED | OUTPATIENT
Start: 2023-09-21

## 2023-09-21 RX ORDER — FLUTICASONE PROPIONATE 50 MCG
SPRAY, SUSPENSION (ML) NASAL
Qty: 16 G | Refills: 5 | Status: SHIPPED | OUTPATIENT
Start: 2023-09-21

## 2023-09-21 RX ORDER — ATORVASTATIN CALCIUM 80 MG/1
TABLET, FILM COATED ORAL
Qty: 90 TABLET | Refills: 3 | Status: SHIPPED | OUTPATIENT
Start: 2023-09-21

## 2023-09-21 RX ORDER — METOPROLOL SUCCINATE 50 MG/1
50 TABLET, EXTENDED RELEASE ORAL DAILY
Qty: 90 TABLET | Refills: 1 | Status: SHIPPED | OUTPATIENT
Start: 2023-09-21

## 2023-09-21 RX ORDER — GLIPIZIDE 10 MG/1
10 TABLET ORAL
Qty: 60 TABLET | Refills: 5 | Status: SHIPPED | OUTPATIENT
Start: 2023-09-21

## 2023-09-21 RX ORDER — BLOOD SUGAR DIAGNOSTIC
1 STRIP MISCELLANEOUS 2 TIMES DAILY
Qty: 200 EACH | Refills: 3 | Status: SHIPPED | OUTPATIENT
Start: 2023-09-21

## 2023-09-21 NOTE — PROGRESS NOTES
orders  -     glipiZIDE (GLUCOTROL) 10 MG tablet; Take 1 tablet by mouth 2 times daily (before meals)  -     blood glucose test strips (ACCU-CHEK GUIDE) strip; 1 each by In Vitro route 2 times daily As needed.   -     Accu-Chek Softclix Lancets MISC; 1 Device by Does not apply route in the morning and at bedtime

## 2023-09-22 LAB
EST. AVERAGE GLUCOSE BLD GHB EST-MCNC: 191.5 MG/DL
HBA1C MFR BLD: 8.3 %

## 2023-12-05 DIAGNOSIS — N18.30 CHRONIC RENAL INSUFFICIENCY, STAGE 3 (MODERATE) (HCC): ICD-10-CM

## 2023-12-05 DIAGNOSIS — I10 ESSENTIAL HYPERTENSION: ICD-10-CM

## 2023-12-05 RX ORDER — LISINOPRIL 20 MG/1
20 TABLET ORAL DAILY
Qty: 90 TABLET | Refills: 0 | OUTPATIENT
Start: 2023-12-05

## 2023-12-08 DIAGNOSIS — I10 ESSENTIAL HYPERTENSION: ICD-10-CM

## 2023-12-08 DIAGNOSIS — E11.9 TYPE 2 DIABETES MELLITUS WITHOUT COMPLICATION, WITH LONG-TERM CURRENT USE OF INSULIN (HCC): ICD-10-CM

## 2023-12-08 DIAGNOSIS — Z79.4 TYPE 2 DIABETES MELLITUS WITHOUT COMPLICATION, WITH LONG-TERM CURRENT USE OF INSULIN (HCC): ICD-10-CM

## 2023-12-08 RX ORDER — METOPROLOL TARTRATE 50 MG/1
50 TABLET, FILM COATED ORAL 2 TIMES DAILY
Qty: 180 TABLET | Refills: 0 | OUTPATIENT
Start: 2023-12-08

## 2023-12-08 RX ORDER — METOPROLOL SUCCINATE 50 MG/1
50 TABLET, EXTENDED RELEASE ORAL DAILY
Qty: 90 TABLET | Refills: 1 | Status: SHIPPED | OUTPATIENT
Start: 2023-12-08

## 2023-12-11 ENCOUNTER — TELEPHONE (OUTPATIENT)
Dept: INTERNAL MEDICINE CLINIC | Age: 78
End: 2023-12-11

## 2023-12-11 NOTE — TELEPHONE ENCOUNTER
The patient called in stating that he was using his metoprolol two times a day and now his script says one time a day he said is this suppose to be one time a day or should it still be two times daily ?

## 2024-03-21 ENCOUNTER — OFFICE VISIT (OUTPATIENT)
Dept: INTERNAL MEDICINE CLINIC | Age: 79
End: 2024-03-21
Payer: MEDICARE

## 2024-03-21 VITALS
BODY MASS INDEX: 28.28 KG/M2 | WEIGHT: 175.2 LBS | HEART RATE: 73 BPM | SYSTOLIC BLOOD PRESSURE: 136 MMHG | DIASTOLIC BLOOD PRESSURE: 70 MMHG | OXYGEN SATURATION: 98 %

## 2024-03-21 DIAGNOSIS — N18.30 CHRONIC RENAL INSUFFICIENCY, STAGE 3 (MODERATE) (HCC): ICD-10-CM

## 2024-03-21 DIAGNOSIS — Z79.4 TYPE 2 DIABETES MELLITUS WITHOUT COMPLICATION, WITH LONG-TERM CURRENT USE OF INSULIN (HCC): ICD-10-CM

## 2024-03-21 DIAGNOSIS — E11.9 TYPE 2 DIABETES MELLITUS WITHOUT COMPLICATION, WITH LONG-TERM CURRENT USE OF INSULIN (HCC): ICD-10-CM

## 2024-03-21 DIAGNOSIS — E78.2 MIXED HYPERLIPIDEMIA: ICD-10-CM

## 2024-03-21 DIAGNOSIS — I50.22 CHRONIC SYSTOLIC CONGESTIVE HEART FAILURE (HCC): ICD-10-CM

## 2024-03-21 DIAGNOSIS — I42.0 DILATED CARDIOMYOPATHY (HCC): ICD-10-CM

## 2024-03-21 DIAGNOSIS — J06.9 UPPER RESPIRATORY TRACT INFECTION, UNSPECIFIED TYPE: Primary | ICD-10-CM

## 2024-03-21 DIAGNOSIS — I73.9 PAD (PERIPHERAL ARTERY DISEASE) (HCC): ICD-10-CM

## 2024-03-21 LAB
ALBUMIN SERPL-MCNC: 4.8 G/DL (ref 3.4–5)
ALBUMIN/GLOB SERPL: 1.7 {RATIO} (ref 1.1–2.2)
ALP SERPL-CCNC: 95 U/L (ref 40–129)
ALT SERPL-CCNC: 46 U/L (ref 10–40)
ANION GAP SERPL CALCULATED.3IONS-SCNC: 11 MMOL/L (ref 3–16)
AST SERPL-CCNC: 33 U/L (ref 15–37)
BASOPHILS # BLD: 0 K/UL (ref 0–0.2)
BASOPHILS NFR BLD: 0.4 %
BILIRUB SERPL-MCNC: 0.6 MG/DL (ref 0–1)
BUN SERPL-MCNC: 32 MG/DL (ref 7–20)
CALCIUM SERPL-MCNC: 9.8 MG/DL (ref 8.3–10.6)
CHLORIDE SERPL-SCNC: 103 MMOL/L (ref 99–110)
CHOLEST SERPL-MCNC: 115 MG/DL (ref 0–199)
CO2 SERPL-SCNC: 24 MMOL/L (ref 21–32)
CREAT SERPL-MCNC: 1.4 MG/DL (ref 0.8–1.3)
DEPRECATED RDW RBC AUTO: 14.2 % (ref 12.4–15.4)
EOSINOPHIL # BLD: 0.1 K/UL (ref 0–0.6)
EOSINOPHIL NFR BLD: 1.2 %
GFR SERPLBLD CREATININE-BSD FMLA CKD-EPI: 52 ML/MIN/{1.73_M2}
GLUCOSE SERPL-MCNC: 217 MG/DL (ref 70–99)
HCT VFR BLD AUTO: 38.7 % (ref 40.5–52.5)
HDLC SERPL-MCNC: 31 MG/DL (ref 40–60)
HGB BLD-MCNC: 13.5 G/DL (ref 13.5–17.5)
LDLC SERPL CALC-MCNC: 54 MG/DL
LYMPHOCYTES # BLD: 1.4 K/UL (ref 1–5.1)
LYMPHOCYTES NFR BLD: 21.5 %
MCH RBC QN AUTO: 33.1 PG (ref 26–34)
MCHC RBC AUTO-ENTMCNC: 34.9 G/DL (ref 31–36)
MCV RBC AUTO: 94.8 FL (ref 80–100)
MONOCYTES # BLD: 0.5 K/UL (ref 0–1.3)
MONOCYTES NFR BLD: 7.3 %
NEUTROPHILS # BLD: 4.6 K/UL (ref 1.7–7.7)
NEUTROPHILS NFR BLD: 69.6 %
PLATELET # BLD AUTO: 188 K/UL (ref 135–450)
PMV BLD AUTO: 9 FL (ref 5–10.5)
POTASSIUM SERPL-SCNC: 6 MMOL/L (ref 3.5–5.1)
PROT SERPL-MCNC: 7.7 G/DL (ref 6.4–8.2)
RBC # BLD AUTO: 4.08 M/UL (ref 4.2–5.9)
SODIUM SERPL-SCNC: 138 MMOL/L (ref 136–145)
TRIGL SERPL-MCNC: 151 MG/DL (ref 0–150)
VLDLC SERPL CALC-MCNC: 30 MG/DL
WBC # BLD AUTO: 6.6 K/UL (ref 4–11)

## 2024-03-21 PROCEDURE — 1123F ACP DISCUSS/DSCN MKR DOCD: CPT | Performed by: INTERNAL MEDICINE

## 2024-03-21 PROCEDURE — 3075F SYST BP GE 130 - 139MM HG: CPT | Performed by: INTERNAL MEDICINE

## 2024-03-21 PROCEDURE — 36415 COLL VENOUS BLD VENIPUNCTURE: CPT | Performed by: INTERNAL MEDICINE

## 2024-03-21 PROCEDURE — 99214 OFFICE O/P EST MOD 30 MIN: CPT | Performed by: INTERNAL MEDICINE

## 2024-03-21 PROCEDURE — 3078F DIAST BP <80 MM HG: CPT | Performed by: INTERNAL MEDICINE

## 2024-03-21 SDOH — ECONOMIC STABILITY: FOOD INSECURITY: WITHIN THE PAST 12 MONTHS, YOU WORRIED THAT YOUR FOOD WOULD RUN OUT BEFORE YOU GOT MONEY TO BUY MORE.: NEVER TRUE

## 2024-03-21 SDOH — ECONOMIC STABILITY: FOOD INSECURITY: WITHIN THE PAST 12 MONTHS, THE FOOD YOU BOUGHT JUST DIDN'T LAST AND YOU DIDN'T HAVE MONEY TO GET MORE.: NEVER TRUE

## 2024-03-21 SDOH — ECONOMIC STABILITY: INCOME INSECURITY: HOW HARD IS IT FOR YOU TO PAY FOR THE VERY BASICS LIKE FOOD, HOUSING, MEDICAL CARE, AND HEATING?: NOT HARD AT ALL

## 2024-03-21 SDOH — ECONOMIC STABILITY: HOUSING INSECURITY
IN THE LAST 12 MONTHS, WAS THERE A TIME WHEN YOU DID NOT HAVE A STEADY PLACE TO SLEEP OR SLEPT IN A SHELTER (INCLUDING NOW)?: NO

## 2024-03-21 ASSESSMENT — PATIENT HEALTH QUESTIONNAIRE - PHQ9
SUM OF ALL RESPONSES TO PHQ QUESTIONS 1-9: 0
2. FEELING DOWN, DEPRESSED OR HOPELESS: NOT AT ALL
SUM OF ALL RESPONSES TO PHQ QUESTIONS 1-9: 0
SUM OF ALL RESPONSES TO PHQ9 QUESTIONS 1 & 2: 0
1. LITTLE INTEREST OR PLEASURE IN DOING THINGS: NOT AT ALL
SUM OF ALL RESPONSES TO PHQ QUESTIONS 1-9: 0
SUM OF ALL RESPONSES TO PHQ QUESTIONS 1-9: 0

## 2024-03-21 NOTE — PROGRESS NOTES
Tristen BENTON Ji  1945 03/21/24    SUBJECTIVE:    8 days ago pt developed cough productive of clear mucous, tinnitus, ear pain, rhinorrhea, nasal congestion. He denies SOB, wheezing, F/C. He has used robitussin, coricidan. He is feeling improved.    Patient compliant with medications for diabetes.he has not been checking sugars. Patient has had no episodes of significant hypoglycemia.    Pt with CKD, currently on lisinopril.    The patient is taking hypertensive medications compliantly without side effects.  Denies chest pain, dyspnea, edema, or TIAs. BP has been 123/77.    Patient denies any chest pain, shortness of breath, myalgias, Patient is tolerating cholesterol medications without difficulty.      He denies SOB, edema. He is on ACE-I and metoprolol.     Pt with PAD diagnosed 8/16; he has had no symptoms from this. He walks 30 minutes on the treadmill.      OBJECTIVE:    /70 (Site: Left Upper Arm, Position: Sitting, Cuff Size: Large Adult)   Pulse 73   Wt 79.5 kg (175 lb 3.2 oz)   SpO2 98%   BMI 28.28 kg/m²     Physical Exam  Constitutional:       Appearance: He is well-developed.   Eyes:      General: No scleral icterus.     Conjunctiva/sclera: Conjunctivae normal.   Neck:      Thyroid: No thyromegaly.      Vascular: No JVD.      Trachea: No tracheal deviation.   Cardiovascular:      Rate and Rhythm: Normal rate and regular rhythm.      Heart sounds: Normal heart sounds.   Pulmonary:      Effort: Pulmonary effort is normal. No respiratory distress.      Breath sounds: Normal breath sounds.   Abdominal:      General: There is no distension.      Palpations: Abdomen is soft. There is no mass.      Tenderness: There is no abdominal tenderness. There is no guarding or rebound.   Musculoskeletal:      Cervical back: Neck supple.   Lymphadenopathy:      Cervical: No cervical adenopathy.   Skin:     Nails: There is no clubbing.   Neurological:      Mental Status: He is alert and oriented to person,

## 2024-03-22 LAB
EST. AVERAGE GLUCOSE BLD GHB EST-MCNC: 188.6 MG/DL
HBA1C MFR BLD: 8.2 %

## 2024-03-25 ENCOUNTER — OFFICE VISIT (OUTPATIENT)
Dept: INTERNAL MEDICINE CLINIC | Age: 79
End: 2024-03-25
Payer: MEDICARE

## 2024-03-25 VITALS — WEIGHT: 177 LBS | BODY MASS INDEX: 28.45 KG/M2 | HEIGHT: 66 IN

## 2024-03-25 DIAGNOSIS — E11.9 TYPE 2 DIABETES MELLITUS WITHOUT COMPLICATION, WITH LONG-TERM CURRENT USE OF INSULIN (HCC): Primary | ICD-10-CM

## 2024-03-25 DIAGNOSIS — Z79.4 TYPE 2 DIABETES MELLITUS WITHOUT COMPLICATION, WITH LONG-TERM CURRENT USE OF INSULIN (HCC): Primary | ICD-10-CM

## 2024-03-25 PROCEDURE — 99213 OFFICE O/P EST LOW 20 MIN: CPT | Performed by: INTERNAL MEDICINE

## 2024-03-25 PROCEDURE — 3052F HG A1C>EQUAL 8.0%<EQUAL 9.0%: CPT | Performed by: INTERNAL MEDICINE

## 2024-03-25 PROCEDURE — 1123F ACP DISCUSS/DSCN MKR DOCD: CPT | Performed by: INTERNAL MEDICINE

## 2024-03-25 RX ORDER — BLOOD SUGAR DIAGNOSTIC
STRIP MISCELLANEOUS
Qty: 200 EACH | Refills: 0 | Status: SHIPPED | OUTPATIENT
Start: 2024-03-25 | End: 2024-03-26

## 2024-03-25 RX ORDER — INSULIN GLARGINE 100 [IU]/ML
10 INJECTION, SOLUTION SUBCUTANEOUS NIGHTLY
Qty: 5 ADJUSTABLE DOSE PRE-FILLED PEN SYRINGE | Refills: 3 | Status: SHIPPED | OUTPATIENT
Start: 2024-03-25

## 2024-03-25 RX ORDER — GLIPIZIDE 10 MG/1
10 TABLET ORAL
Qty: 30 TABLET | Refills: 5 | Status: SHIPPED | OUTPATIENT
Start: 2024-03-25

## 2024-03-25 NOTE — PROGRESS NOTES
Tristen BENTON Ji  1945 03/25/24    SUBJECTIVE:    Pts potassium was 6.0 at the last check.He was sent tot he ER, where the potassium was 5.1.       Pt has had intermittent hypoglycemia in the evening at bedtime. A1c was 8.2. he did not tolerate jardiance in the past - acidosis.    OBJECTIVE:    Ht 1.676 m (5' 5.98\")   Wt 80.3 kg (177 lb)   BMI 28.58 kg/m²     Physical Exam    ASSESSMENT:    1. Type 2 diabetes mellitus without complication, with long-term current use of insulin (HCC)        PLAN:    Tristen was seen today for other, discuss medications and discuss labs.    Diagnoses and all orders for this visit:    Type 2 diabetes mellitus without complication, with long-term current use of insulin ([facility]) - will stop the dinner glipizide. Cont metformin. Cost is a significant issue. I will add low dose of luntus, check sugars and increase as needed. My hope is that the insulin will lower sugars all days (not just in the evening) thereby preventing hypoglycemia but improving overall control  -     glipiZIDE (GLUCOTROL) 10 MG tablet; Take 1 tablet by mouth every morning (before breakfast)  -     insulin glargine (LANTUS SOLOSTAR) 100 UNIT/ML injection pen; Inject 10 Units into the skin nightly

## 2024-03-26 RX ORDER — GLUCOSAMINE HCL/CHONDROITIN SU 500-400 MG
CAPSULE ORAL
Qty: 200 STRIP | Refills: 3 | Status: SHIPPED | OUTPATIENT
Start: 2024-03-26 | End: 2024-03-27 | Stop reason: SDUPTHER

## 2024-03-27 ENCOUNTER — TELEPHONE (OUTPATIENT)
Dept: INTERNAL MEDICINE CLINIC | Age: 79
End: 2024-03-27

## 2024-03-27 RX ORDER — GLUCOSAMINE HCL/CHONDROITIN SU 500-400 MG
CAPSULE ORAL
Qty: 200 STRIP | Refills: 3 | Status: SHIPPED | OUTPATIENT
Start: 2024-03-27

## 2024-03-27 RX ORDER — LANCETS 30 GAUGE
1 EACH MISCELLANEOUS 2 TIMES DAILY
Qty: 100 EACH | Refills: 11 | Status: SHIPPED | OUTPATIENT
Start: 2024-03-27

## 2024-03-27 NOTE — TELEPHONE ENCOUNTER
The pharmacy called in wanting to know if you could place an order for the patient a meter and lancets ?

## 2024-04-01 ENCOUNTER — TELEPHONE (OUTPATIENT)
Dept: INTERNAL MEDICINE CLINIC | Age: 79
End: 2024-04-01

## 2024-04-01 RX ORDER — BLOOD-GLUCOSE METER
1 KIT MISCELLANEOUS DAILY
Qty: 1 KIT | Refills: 0 | Status: SHIPPED | OUTPATIENT
Start: 2024-04-01

## 2024-04-25 ENCOUNTER — OFFICE VISIT (OUTPATIENT)
Dept: INTERNAL MEDICINE CLINIC | Age: 79
End: 2024-04-25
Payer: MEDICARE

## 2024-04-25 VITALS
BODY MASS INDEX: 27.34 KG/M2 | SYSTOLIC BLOOD PRESSURE: 156 MMHG | HEART RATE: 66 BPM | HEIGHT: 67 IN | DIASTOLIC BLOOD PRESSURE: 78 MMHG | WEIGHT: 174.2 LBS | OXYGEN SATURATION: 99 %

## 2024-04-25 DIAGNOSIS — E11.9 TYPE 2 DIABETES MELLITUS WITHOUT COMPLICATION, WITH LONG-TERM CURRENT USE OF INSULIN (HCC): Primary | ICD-10-CM

## 2024-04-25 DIAGNOSIS — I10 ESSENTIAL HYPERTENSION: ICD-10-CM

## 2024-04-25 DIAGNOSIS — Z79.4 TYPE 2 DIABETES MELLITUS WITHOUT COMPLICATION, WITH LONG-TERM CURRENT USE OF INSULIN (HCC): Primary | ICD-10-CM

## 2024-04-25 PROCEDURE — 99213 OFFICE O/P EST LOW 20 MIN: CPT | Performed by: INTERNAL MEDICINE

## 2024-04-25 PROCEDURE — 3077F SYST BP >= 140 MM HG: CPT | Performed by: INTERNAL MEDICINE

## 2024-04-25 PROCEDURE — 3078F DIAST BP <80 MM HG: CPT | Performed by: INTERNAL MEDICINE

## 2024-04-25 PROCEDURE — 1123F ACP DISCUSS/DSCN MKR DOCD: CPT | Performed by: INTERNAL MEDICINE

## 2024-04-25 PROCEDURE — 3052F HG A1C>EQUAL 8.0%<EQUAL 9.0%: CPT | Performed by: INTERNAL MEDICINE

## 2024-04-25 NOTE — PROGRESS NOTES
Tristen BENTON Ji  1945 04/25/24    SUBJECTIVE:    The patient is taking hypertensive medications compliantly without side effects.  Denies chest pain, dyspnea, edema, or TIA's. Blood pressure has been 132/68.     Patient compliant with medications for diabetes. Blood sugars have averaged around , with pt checking blood sugar 2 times daily. Patient has had no episodes of significant hypoglycemia.     OBJECTIVE:    BP (!) 156/78 (Site: Right Upper Arm, Position: Sitting, Cuff Size: Large Adult)   Pulse 66   Ht 1.702 m (5' 7\")   Wt 79 kg (174 lb 3.2 oz)   SpO2 99%   BMI 27.28 kg/m²     Physical Exam    ASSESSMENT:    1. Type 2 diabetes mellitus without complication, with long-term current use of insulin (HCC)    2. Essential hypertension        PLAN:    Tristen was seen today for 1 month follow-up.    Diagnoses and all orders for this visit:    Type 2 diabetes mellitus without complication, with long-term current use of insulin (HCC) - much improved with the addition of low dose insulin. COud stop the glipizide and increase insulin, but pt not interested at this point.   -     Hemoglobin A1C; Future    Essential hypertension - elevated today but doing well at home; no change   -     Comprehensive Metabolic Panel; Future  -     Lipid Panel; Future  -     CBC; Future

## 2024-05-29 ENCOUNTER — OFFICE VISIT (OUTPATIENT)
Dept: CARDIOLOGY CLINIC | Age: 79
End: 2024-05-29
Payer: MEDICARE

## 2024-05-29 VITALS
HEART RATE: 68 BPM | DIASTOLIC BLOOD PRESSURE: 68 MMHG | WEIGHT: 173.2 LBS | SYSTOLIC BLOOD PRESSURE: 132 MMHG | HEIGHT: 67 IN | BODY MASS INDEX: 27.18 KG/M2

## 2024-05-29 DIAGNOSIS — I10 ESSENTIAL HYPERTENSION: Primary | ICD-10-CM

## 2024-05-29 PROCEDURE — 99214 OFFICE O/P EST MOD 30 MIN: CPT | Performed by: INTERNAL MEDICINE

## 2024-05-29 PROCEDURE — 1123F ACP DISCUSS/DSCN MKR DOCD: CPT | Performed by: INTERNAL MEDICINE

## 2024-05-29 PROCEDURE — 3078F DIAST BP <80 MM HG: CPT | Performed by: INTERNAL MEDICINE

## 2024-05-29 PROCEDURE — 3075F SYST BP GE 130 - 139MM HG: CPT | Performed by: INTERNAL MEDICINE

## 2024-05-29 PROCEDURE — 93000 ELECTROCARDIOGRAM COMPLETE: CPT | Performed by: INTERNAL MEDICINE

## 2024-05-29 NOTE — PROGRESS NOTES
Sam Galarza MD        OFFICE  FOLLOWUP NOTE    Chief complaints: patient is here for management of   CAD, DM, HTN, dyspnea, DYSLPIDEMIA, CKD and obesity, covid 19     Subjective: patient feels better, no chest pain, no shortness of breath, no dizziness, no palpitations    AI Ramon is a 79 y.o.year old who  has a past medical history of CAD (coronary artery disease), Cerebral artery occlusion with cerebral infarction (HCC), Chronic back pain, Diabetes mellitus (HCC), Erectile dysfunction, Gastritis, Glaucoma, H/O cardiovascular stress test, H/O Doppler echocardiogram, H/O echocardiogram, H/O left heart catheterization by cutdown, Hx of cardiovascular stress test, Hx of echocardiogram, Hyperlipidemia, Hypertension, Polyp of gallbladder, PVD (peripheral vascular disease) (HCC), Teeth missing, and Wears glasses. and presents for management of   CAD, DM, HTN, dyspnea, DYSLPIDEMIA, CKD and obesity, covid 19 , which are well controlled      Current Outpatient Medications   Medication Sig Dispense Refill    glucose monitoring kit 1 kit by Does not apply route daily Check sugar 2 times daily Dx: E11.9 1 kit 0    blood glucose monitor strips Test 2 times a day Dx: E11.9 200 strip 3    Lancets MISC 1 each by Does not apply route 2 times daily Dx E11.9 100 each 11    glipiZIDE (GLUCOTROL) 10 MG tablet Take 1 tablet by mouth every morning (before breakfast) 30 tablet 5    insulin glargine (LANTUS SOLOSTAR) 100 UNIT/ML injection pen Inject 10 Units into the skin nightly 5 Adjustable Dose Pre-filled Pen Syringe 3    metFORMIN (GLUCOPHAGE) 500 MG tablet TAKE 1 TABLET BY MOUTH TWICE DAILY WITH MEALS 180 tablet 0    metoprolol succinate (TOPROL XL) 50 MG extended release tablet Take 1 tablet by mouth daily 90 tablet 1    lisinopril (PRINIVIL;ZESTRIL) 30 MG tablet Take 1 tablet by mouth daily 90 tablet 3    atorvastatin (LIPITOR) 80 MG tablet TAKE 1 TABLET BY MOUTH AT BEDTIME 90 tablet 3    Blood Glucose

## 2024-06-04 ENCOUNTER — OFFICE VISIT (OUTPATIENT)
Dept: INTERNAL MEDICINE CLINIC | Age: 79
End: 2024-06-04
Payer: MEDICARE

## 2024-06-04 VITALS
SYSTOLIC BLOOD PRESSURE: 130 MMHG | WEIGHT: 171.6 LBS | DIASTOLIC BLOOD PRESSURE: 66 MMHG | BODY MASS INDEX: 26.93 KG/M2 | OXYGEN SATURATION: 97 % | HEART RATE: 75 BPM | HEIGHT: 67 IN

## 2024-06-04 DIAGNOSIS — R30.0 DYSURIA: Primary | ICD-10-CM

## 2024-06-04 PROCEDURE — 1123F ACP DISCUSS/DSCN MKR DOCD: CPT | Performed by: INTERNAL MEDICINE

## 2024-06-04 PROCEDURE — 3078F DIAST BP <80 MM HG: CPT | Performed by: INTERNAL MEDICINE

## 2024-06-04 PROCEDURE — 3075F SYST BP GE 130 - 139MM HG: CPT | Performed by: INTERNAL MEDICINE

## 2024-06-04 PROCEDURE — 99214 OFFICE O/P EST MOD 30 MIN: CPT | Performed by: INTERNAL MEDICINE

## 2024-06-04 RX ORDER — CIPROFLOXACIN 250 MG/1
250 TABLET, FILM COATED ORAL 2 TIMES DAILY
Qty: 20 TABLET | Refills: 0 | Status: SHIPPED | OUTPATIENT
Start: 2024-06-04 | End: 2024-06-14

## 2024-06-04 NOTE — PROGRESS NOTES
Tristen BENTON [place]  1945 06/04/24    SUBJECTIVE:    Pt developed dysuria yesterday, worse today. He has had frequency and suprapubic abd pain.     He denies N/V, F/C, urgency, rectal pain, pain with BMs.     He has not treated this.     OBJECTIVE:    /66 (Site: Left Upper Arm, Position: Sitting, Cuff Size: Medium Adult)   Pulse 75   Ht 1.702 m (5' 7\")   Wt 77.8 kg (171 lb 9.6 oz)   SpO2 97%   BMI 26.88 kg/m²     Physical Exam  Constitutional:       Appearance: He is well-developed.   Eyes:      Comments: Conjunctiva pink   Cardiovascular:      Rate and Rhythm: Normal rate and regular rhythm.   Pulmonary:      Effort: Pulmonary effort is normal.      Breath sounds: Normal breath sounds.   Abdominal:      General: Bowel sounds are normal.      Palpations: Abdomen is soft.      Tenderness: There is abdominal tenderness in the suprapubic area. There is no rebound.   Neurological:      Mental Status: He is alert.     Prostate non-tender, slightly enlarged, no nodules.     ASSESSMENT:    1. Dysuria        PLAN:    Tristen was seen today for urinary tract infection.    Diagnoses and all orders for this visit:    Dysuria - prostate exam (-) so we do not need prolonged abx. Need to Tx for UTI, though dipstick was (-). Will start cipro and send for culture. Depending on results will likely need to refer to urology.   -     ciprofloxacin (CIPRO) 250 MG tablet; Take 1 tablet by mouth 2 times daily for 10 days  -     Urinalysis; Future  -     Culture, Urine; Future  -     Culture, Urine  -     Urinalysis

## 2024-06-05 LAB
BILIRUB UR QL STRIP.AUTO: NEGATIVE
CLARITY UR: CLEAR
COLOR UR: YELLOW
GLUCOSE UR STRIP.AUTO-MCNC: NEGATIVE MG/DL
HGB UR QL STRIP.AUTO: NEGATIVE
KETONES UR STRIP.AUTO-MCNC: NEGATIVE MG/DL
LEUKOCYTE ESTERASE UR QL STRIP.AUTO: NEGATIVE
NITRITE UR QL STRIP.AUTO: NEGATIVE
PH UR STRIP.AUTO: 5.5 [PH] (ref 5–8)
PROT UR STRIP.AUTO-MCNC: NEGATIVE MG/DL
SP GR UR STRIP.AUTO: 1.02 (ref 1–1.03)
UA DIPSTICK W REFLEX MICRO PNL UR: NORMAL
URN SPEC COLLECT METH UR: NORMAL
UROBILINOGEN UR STRIP-ACNC: 0.2 E.U./DL

## 2024-06-06 LAB — BACTERIA UR CULT: NORMAL

## 2024-06-07 ENCOUNTER — TELEPHONE (OUTPATIENT)
Dept: INTERNAL MEDICINE CLINIC | Age: 79
End: 2024-06-07

## 2024-06-07 NOTE — TELEPHONE ENCOUNTER
The patient called in stating that he was cutting grass and developed a rash well he believes it is poison ivy and he said he is on keflex and it mentions if he develops a rash on the medication mention it to the provider.He said he really think that it is poison ivy and would like to know what you suggest ? He said it really itches.

## 2024-06-12 DIAGNOSIS — I10 ESSENTIAL HYPERTENSION: ICD-10-CM

## 2024-06-12 RX ORDER — METOPROLOL SUCCINATE 50 MG/1
50 TABLET, EXTENDED RELEASE ORAL DAILY
Qty: 90 TABLET | Refills: 0 | Status: SHIPPED | OUTPATIENT
Start: 2024-06-12

## 2024-06-13 DIAGNOSIS — Z79.4 TYPE 2 DIABETES MELLITUS WITHOUT COMPLICATION, WITH LONG-TERM CURRENT USE OF INSULIN (HCC): ICD-10-CM

## 2024-06-13 DIAGNOSIS — I10 ESSENTIAL HYPERTENSION: ICD-10-CM

## 2024-06-13 DIAGNOSIS — E11.9 TYPE 2 DIABETES MELLITUS WITHOUT COMPLICATION, WITH LONG-TERM CURRENT USE OF INSULIN (HCC): ICD-10-CM

## 2024-06-13 LAB
ALBUMIN SERPL-MCNC: 4.2 G/DL (ref 3.4–5)
ALBUMIN/GLOB SERPL: 1.6 {RATIO} (ref 1.1–2.2)
ALP SERPL-CCNC: 100 U/L (ref 40–129)
ALT SERPL-CCNC: 28 U/L (ref 10–40)
ANION GAP SERPL CALCULATED.3IONS-SCNC: 13 MMOL/L (ref 3–16)
AST SERPL-CCNC: 25 U/L (ref 15–37)
BILIRUB SERPL-MCNC: 0.4 MG/DL (ref 0–1)
BUN SERPL-MCNC: 33 MG/DL (ref 7–20)
CALCIUM SERPL-MCNC: 9.4 MG/DL (ref 8.3–10.6)
CHLORIDE SERPL-SCNC: 104 MMOL/L (ref 99–110)
CHOLEST SERPL-MCNC: 95 MG/DL (ref 0–199)
CO2 SERPL-SCNC: 24 MMOL/L (ref 21–32)
CREAT SERPL-MCNC: 1.6 MG/DL (ref 0.8–1.3)
DEPRECATED RDW RBC AUTO: 13.3 % (ref 12.4–15.4)
GFR SERPLBLD CREATININE-BSD FMLA CKD-EPI: 44 ML/MIN/{1.73_M2}
GLUCOSE SERPL-MCNC: 123 MG/DL (ref 70–99)
HCT VFR BLD AUTO: 34.2 % (ref 40.5–52.5)
HDLC SERPL-MCNC: 32 MG/DL (ref 40–60)
HGB BLD-MCNC: 11.7 G/DL (ref 13.5–17.5)
LDLC SERPL CALC-MCNC: 47 MG/DL
MCH RBC QN AUTO: 32.7 PG (ref 26–34)
MCHC RBC AUTO-ENTMCNC: 34.3 G/DL (ref 31–36)
MCV RBC AUTO: 95.6 FL (ref 80–100)
PLATELET # BLD AUTO: 191 K/UL (ref 135–450)
PMV BLD AUTO: 9.3 FL (ref 5–10.5)
POTASSIUM SERPL-SCNC: 5.4 MMOL/L (ref 3.5–5.1)
PROT SERPL-MCNC: 6.9 G/DL (ref 6.4–8.2)
RBC # BLD AUTO: 3.58 M/UL (ref 4.2–5.9)
SODIUM SERPL-SCNC: 141 MMOL/L (ref 136–145)
TRIGL SERPL-MCNC: 82 MG/DL (ref 0–150)
VLDLC SERPL CALC-MCNC: 16 MG/DL
WBC # BLD AUTO: 4.7 K/UL (ref 4–11)

## 2024-06-13 PROCEDURE — 36415 COLL VENOUS BLD VENIPUNCTURE: CPT | Performed by: INTERNAL MEDICINE

## 2024-06-14 LAB
EST. AVERAGE GLUCOSE BLD GHB EST-MCNC: 148.5 MG/DL
HBA1C MFR BLD: 6.8 %

## 2024-07-23 ENCOUNTER — OFFICE VISIT (OUTPATIENT)
Dept: INTERNAL MEDICINE CLINIC | Age: 79
End: 2024-07-23
Payer: MEDICARE

## 2024-07-23 VITALS
SYSTOLIC BLOOD PRESSURE: 132 MMHG | BODY MASS INDEX: 27.06 KG/M2 | HEART RATE: 59 BPM | WEIGHT: 172.4 LBS | OXYGEN SATURATION: 99 % | DIASTOLIC BLOOD PRESSURE: 68 MMHG | HEIGHT: 67 IN

## 2024-07-23 DIAGNOSIS — R60.0 LOWER EXTREMITY EDEMA: ICD-10-CM

## 2024-07-23 DIAGNOSIS — M54.50 ACUTE RIGHT-SIDED LOW BACK PAIN WITHOUT SCIATICA: Primary | ICD-10-CM

## 2024-07-23 PROCEDURE — 3075F SYST BP GE 130 - 139MM HG: CPT | Performed by: INTERNAL MEDICINE

## 2024-07-23 PROCEDURE — 99213 OFFICE O/P EST LOW 20 MIN: CPT | Performed by: INTERNAL MEDICINE

## 2024-07-23 PROCEDURE — 1123F ACP DISCUSS/DSCN MKR DOCD: CPT | Performed by: INTERNAL MEDICINE

## 2024-07-23 PROCEDURE — 3078F DIAST BP <80 MM HG: CPT | Performed by: INTERNAL MEDICINE

## 2024-07-23 PROCEDURE — G2211 COMPLEX E/M VISIT ADD ON: HCPCS | Performed by: INTERNAL MEDICINE

## 2024-07-23 RX ORDER — FUROSEMIDE 20 MG/1
20 TABLET ORAL DAILY PRN
Qty: 45 TABLET | Refills: 1 | Status: SHIPPED | OUTPATIENT
Start: 2024-07-23

## 2024-07-23 RX ORDER — ASPIRIN 81 MG/1
81 TABLET ORAL DAILY
COMMUNITY
Start: 2024-07-23

## 2024-07-23 NOTE — PROGRESS NOTES
Tristen Workman  1945 07/23/24    SUBJECTIVE:    Urinary symptoms resolved - culture had been (-), but symptoms resolved suddenly without recurrence.    Pt also with LBP. He was cutting grass and his riding mower hit a hole and he immediately had pain in the right lower back. This is worse with any movement. He denies N/W. He has been seeing a chiropractor with benefit.     He had been on lasix intermittently for edema, and he is interested in resuming this PRN.         OBJECTIVE:    /68 (Site: Right Upper Arm, Position: Sitting, Cuff Size: Medium Adult)   Pulse 59   Ht 1.702 m (5' 7\")   Wt 78.2 kg (172 lb 6.4 oz)   SpO2 99%   BMI 27.00 kg/m²     Physical Exam  Constitutional:       Appearance: He is well-developed.   Eyes:      General: No scleral icterus.     Conjunctiva/sclera: Conjunctivae normal.   Cardiovascular:      Rate and Rhythm: Normal rate and regular rhythm.      Heart sounds: Normal heart sounds. No murmur heard.  Pulmonary:      Effort: Pulmonary effort is normal. No respiratory distress.      Breath sounds: Normal breath sounds. No wheezing.     Right lumbar paraspinal muscle tenderness    ASSESSMENT:    1. Acute right-sided low back pain without sciatica    2. Lower extremity edema        PLAN:    Tristen was seen today for 3 month follow-up.    Diagnoses and all orders for this visit:    Acute right-sided low back pain without sciatica - no neuro symptoms, and improving with chiropractic care. No change in mgmt. Avoid NSAIDs.     Lower extremity edema - OK to use lasix rarely for edea.need to be careful with the renal function.   -     furosemide (LASIX) 20 MG tablet; Take 1 tablet by mouth daily as needed (edema)    Other orders - OK to decrease ASA to 81 mg daily  -     aspirin 81 MG EC tablet; Take 1 tablet by mouth daily Over The Counter

## 2024-08-05 ENCOUNTER — OFFICE VISIT (OUTPATIENT)
Dept: INTERNAL MEDICINE CLINIC | Age: 79
End: 2024-08-05
Payer: MEDICARE

## 2024-08-05 VITALS
WEIGHT: 176.6 LBS | RESPIRATION RATE: 20 BRPM | BODY MASS INDEX: 27.72 KG/M2 | OXYGEN SATURATION: 97 % | SYSTOLIC BLOOD PRESSURE: 136 MMHG | HEART RATE: 71 BPM | HEIGHT: 67 IN | DIASTOLIC BLOOD PRESSURE: 78 MMHG

## 2024-08-05 DIAGNOSIS — M54.50 ACUTE RIGHT-SIDED LOW BACK PAIN WITHOUT SCIATICA: Primary | ICD-10-CM

## 2024-08-05 PROCEDURE — 3075F SYST BP GE 130 - 139MM HG: CPT | Performed by: NURSE PRACTITIONER

## 2024-08-05 PROCEDURE — 99213 OFFICE O/P EST LOW 20 MIN: CPT | Performed by: NURSE PRACTITIONER

## 2024-08-05 PROCEDURE — 1123F ACP DISCUSS/DSCN MKR DOCD: CPT | Performed by: NURSE PRACTITIONER

## 2024-08-05 PROCEDURE — 3078F DIAST BP <80 MM HG: CPT | Performed by: NURSE PRACTITIONER

## 2024-08-05 RX ORDER — TIZANIDINE 2 MG/1
2 TABLET ORAL 3 TIMES DAILY PRN
Qty: 30 TABLET | Refills: 0 | Status: SHIPPED | OUTPATIENT
Start: 2024-08-05

## 2024-08-05 RX ORDER — PREDNISONE 10 MG/1
TABLET ORAL
Qty: 20 TABLET | Refills: 0 | Status: SHIPPED | OUTPATIENT
Start: 2024-08-05

## 2024-08-05 RX ORDER — TRAMADOL HYDROCHLORIDE 50 MG/1
50 TABLET ORAL EVERY 8 HOURS PRN
Qty: 15 TABLET | Refills: 0 | Status: SHIPPED | OUTPATIENT
Start: 2024-08-05 | End: 2024-08-10

## 2024-08-05 ASSESSMENT — ENCOUNTER SYMPTOMS
BACK PAIN: 1
SORE THROAT: 0
SINUS PAIN: 0
COUGH: 0
ABDOMINAL PAIN: 0
SHORTNESS OF BREATH: 0
SINUS PRESSURE: 0
CHEST TIGHTNESS: 0
ABDOMINAL DISTENTION: 0
DIARRHEA: 0
NAUSEA: 0
COLOR CHANGE: 0
EYES NEGATIVE: 1
CONSTIPATION: 0
WHEEZING: 0

## 2024-08-05 NOTE — PROGRESS NOTES
Tristen Workman  1945 08/05/24    Chief Complaint   Patient presents with    Back Pain     Pt reports that he had picked up a box and injured his lower back sx started 7/25/24       SUBJECTIVE:      Mr Workman is a current patient of Dr Simpson who presents for c/o ongoing right sided lower back pain which has been ongoing since doing some yard work and cleaning out his shed about 1.5 weeks ago. He has been seen and also evaluated at [place] [facility]. Was given toradol, valium injections and sent him home with lidocaine patches, flexeril, and ibuprofen (which he cannot take). Only received 24 hours of relief from meds given in ER and feels flexeril is not helping. Denies any loss of bowel/bladder control. Denies any loss of bowel/bladder control, saddle anesthesia, or other red flag warning signs.     Review of Systems   Constitutional:  Negative for activity change, appetite change, fatigue and fever.   HENT:  Negative for congestion, sinus pressure, sinus pain and sore throat.    Eyes: Negative.    Respiratory:  Negative for cough, chest tightness, shortness of breath and wheezing.    Cardiovascular:  Negative for chest pain and palpitations.   Gastrointestinal:  Negative for abdominal distention, abdominal pain, constipation, diarrhea and nausea.   Genitourinary:  Negative for difficulty urinating, dysuria, flank pain, frequency and hematuria.   Musculoskeletal:  Positive for back pain. Negative for arthralgias, gait problem, joint swelling and myalgias.   Skin:  Negative for color change and rash.   Neurological:  Negative for dizziness, light-headedness and numbness.   Hematological: Negative.    Psychiatric/Behavioral: Negative.         OBJECTIVE:    /78   Pulse 71   Resp 20   Ht 1.702 m (5' 7.01\")   Wt 80.1 kg (176 lb 9.6 oz)   SpO2 97%   BMI 27.65 kg/m²     Physical Exam  Constitutional:       General: He is not in acute distress.     Appearance: He is well-developed. He is not diaphoretic.

## 2024-08-13 ENCOUNTER — OFFICE VISIT (OUTPATIENT)
Dept: INTERNAL MEDICINE CLINIC | Age: 79
End: 2024-08-13
Payer: MEDICARE

## 2024-08-13 VITALS
OXYGEN SATURATION: 97 % | WEIGHT: 172 LBS | HEIGHT: 67 IN | DIASTOLIC BLOOD PRESSURE: 68 MMHG | BODY MASS INDEX: 27 KG/M2 | SYSTOLIC BLOOD PRESSURE: 138 MMHG | HEART RATE: 75 BPM

## 2024-08-13 DIAGNOSIS — M54.50 ACUTE RIGHT-SIDED LOW BACK PAIN WITHOUT SCIATICA: ICD-10-CM

## 2024-08-13 PROCEDURE — 3075F SYST BP GE 130 - 139MM HG: CPT | Performed by: INTERNAL MEDICINE

## 2024-08-13 PROCEDURE — 99213 OFFICE O/P EST LOW 20 MIN: CPT | Performed by: INTERNAL MEDICINE

## 2024-08-13 PROCEDURE — 1123F ACP DISCUSS/DSCN MKR DOCD: CPT | Performed by: INTERNAL MEDICINE

## 2024-08-13 PROCEDURE — G2211 COMPLEX E/M VISIT ADD ON: HCPCS | Performed by: INTERNAL MEDICINE

## 2024-08-13 PROCEDURE — 3078F DIAST BP <80 MM HG: CPT | Performed by: INTERNAL MEDICINE

## 2024-08-13 RX ORDER — TIZANIDINE 4 MG/1
4 TABLET ORAL 3 TIMES DAILY PRN
Qty: 90 TABLET | Refills: 1 | Status: SHIPPED | OUTPATIENT
Start: 2024-08-13

## 2024-08-13 NOTE — PROGRESS NOTES
Tristen BENTON Ji  1945 08/13/24    SUBJECTIVE:    Pt complains of LBP. This started ~1 month ago when he was riding his riding  and hit a hole.  He had been seeing a chiropractor with temporary benefit. The pain was on the right,but now is on the left side.  There is no radiation into the legs, no N/W.     He was seen by Alexandru 8/5, was started on prednisone, zanaflex, and tramadol with little benefit.     OBJECTIVE:    /68 (Site: Right Upper Arm, Position: Sitting, Cuff Size: Medium Adult)   Pulse 75   Ht 1.702 m (5' 7\")   Wt 78 kg (172 lb)   SpO2 97%   BMI 26.94 kg/m²     Physical Exam  Musculoskeletal:      Lumbar back: Tenderness (paraspinal muscle tenderness) present. No bony tenderness. Normal range of motion.   Neurological:      Comments: (-) straight leg raise bilaterally         ASSESSMENT:    1. Acute right-sided low back pain without sciatica        PLAN:    Tristen was seen today for back pain.    Diagnoses and all orders for this visit:    Acute right-sided low back pain without sciatica - continues to struggle with LBP. No neuro symptoms, pain not at midline. Could be compression fracture but seems more likley muscular. Will cont zanaflex, PT, heat.   -     tiZANidine (ZANAFLEX) 4 MG tablet; Take 1 tablet by mouth 3 times daily as needed (pain/spasm)  -     XR LUMBAR SPINE (2-3 VIEWS); Future  -     [facility] Physical [facility]

## 2024-08-14 ENCOUNTER — HOSPITAL ENCOUNTER (OUTPATIENT)
Age: 79
Discharge: HOME | End: 2024-08-14
Payer: MEDICARE

## 2024-08-14 ENCOUNTER — HOSPITAL ENCOUNTER (OUTPATIENT)
Dept: GENERAL RADIOLOGY | Age: 79
Discharge: HOME | End: 2024-08-14
Payer: MEDICARE

## 2024-08-14 DIAGNOSIS — M54.50 ACUTE RIGHT-SIDED LOW BACK PAIN WITHOUT SCIATICA: ICD-10-CM

## 2024-08-14 PROCEDURE — 72100 X-RAY EXAM L-S SPINE 2/3 VWS: CPT

## 2024-08-22 ENCOUNTER — OFFICE VISIT (OUTPATIENT)
Dept: INTERNAL MEDICINE CLINIC | Age: 79
End: 2024-08-22
Payer: MEDICARE

## 2024-08-22 VITALS
SYSTOLIC BLOOD PRESSURE: 124 MMHG | OXYGEN SATURATION: 97 % | HEART RATE: 89 BPM | BODY MASS INDEX: 26.3 KG/M2 | RESPIRATION RATE: 16 BRPM | HEIGHT: 67 IN | DIASTOLIC BLOOD PRESSURE: 62 MMHG | WEIGHT: 167.6 LBS

## 2024-08-22 DIAGNOSIS — M48.061 SPINAL STENOSIS OF LUMBAR REGION, UNSPECIFIED WHETHER NEUROGENIC CLAUDICATION PRESENT: Primary | ICD-10-CM

## 2024-08-22 PROCEDURE — 3078F DIAST BP <80 MM HG: CPT | Performed by: INTERNAL MEDICINE

## 2024-08-22 PROCEDURE — G2211 COMPLEX E/M VISIT ADD ON: HCPCS | Performed by: INTERNAL MEDICINE

## 2024-08-22 PROCEDURE — 99214 OFFICE O/P EST MOD 30 MIN: CPT | Performed by: INTERNAL MEDICINE

## 2024-08-22 PROCEDURE — 1123F ACP DISCUSS/DSCN MKR DOCD: CPT | Performed by: INTERNAL MEDICINE

## 2024-08-22 PROCEDURE — 3074F SYST BP LT 130 MM HG: CPT | Performed by: INTERNAL MEDICINE

## 2024-08-22 RX ORDER — CYCLOBENZAPRINE HCL 10 MG
TABLET ORAL
COMMUNITY
Start: 2024-07-25

## 2024-08-22 RX ORDER — PREDNISONE 20 MG/1
40 TABLET ORAL DAILY
COMMUNITY
Start: 2024-08-20 | End: 2024-08-24

## 2024-08-22 NOTE — PROGRESS NOTES
Tristen BENTON Ji  1945 08/22/24    SUBJECTIVE:    Pt went to the ER last Monday. He was having difficulty with walking. MRI showed severe facet hypertrophy with bulge causing mod-severe foraminal narrowing L4-5 and moderate central stenosis at that level. He was given a shot of medrol and started on prednisone in the ER, symptoms are slightly improved. Xray of the right knee showed an effusion.     He was having swelling in the knees and ankle R>L, and pain in the ankles, knees, and lower back. He had weakness in both legs, denies any numbness.     OBJECTIVE:    /62 (Site: Right Upper Arm, Position: Sitting, Cuff Size: Medium Adult)   Pulse 89   Resp 16   Ht 1.702 m (5' 7\")   Wt 76 kg (167 lb 9.6 oz)   SpO2 97%   BMI 26.25 kg/m²     Physical Exam  Musculoskeletal:      Lumbar back: Tenderness (paraspinal muscle tenderness) present. No bony tenderness. Normal range of motion.   Neurological:      Comments: (-) straight leg raise bilaterally         ASSESSMENT:    1. Spinal stenosis of lumbar region, unspecified whether neurogenic claudication present        PLAN:    Tristen was seen today for follow-up and referral - general.    Diagnoses and all orders for this visit:    Spinal stenosis of lumbar region, unspecified whether neurogenic claudication present - Reviewed ER records. pt with persistent LBP perhaps with radicular symptoms (the knee and ankle swelling could not be radicular, and I am not sure what caused this swelling). I will ask Dr Cruz to see the pt in consultation. Avoid NSAIDs given pts CKD. If the back improves but knee and/or ankle symptoms worsen he may need to see ortho  -     [facility] - Dayron Cruz MD, Orthopedic Surgery, [place]

## 2024-08-28 DIAGNOSIS — E78.2 MIXED HYPERLIPIDEMIA: ICD-10-CM

## 2024-08-28 RX ORDER — ATORVASTATIN CALCIUM 80 MG/1
TABLET, FILM COATED ORAL
Qty: 90 TABLET | Refills: 0 | Status: SHIPPED | OUTPATIENT
Start: 2024-08-28

## 2024-09-17 ENCOUNTER — OFFICE VISIT (OUTPATIENT)
Dept: INTERNAL MEDICINE CLINIC | Age: 79
End: 2024-09-17
Payer: MEDICARE

## 2024-09-17 VITALS
BODY MASS INDEX: 25.9 KG/M2 | HEIGHT: 67 IN | WEIGHT: 165 LBS | OXYGEN SATURATION: 98 % | DIASTOLIC BLOOD PRESSURE: 70 MMHG | HEART RATE: 67 BPM | SYSTOLIC BLOOD PRESSURE: 120 MMHG

## 2024-09-17 VITALS
WEIGHT: 165 LBS | DIASTOLIC BLOOD PRESSURE: 70 MMHG | SYSTOLIC BLOOD PRESSURE: 120 MMHG | BODY MASS INDEX: 25.84 KG/M2 | RESPIRATION RATE: 18 BRPM | HEART RATE: 67 BPM | OXYGEN SATURATION: 98 %

## 2024-09-17 DIAGNOSIS — Z00.00 MEDICARE ANNUAL WELLNESS VISIT, SUBSEQUENT: Primary | ICD-10-CM

## 2024-09-17 DIAGNOSIS — Z79.4 TYPE 2 DIABETES MELLITUS WITHOUT COMPLICATION, WITH LONG-TERM CURRENT USE OF INSULIN (HCC): ICD-10-CM

## 2024-09-17 DIAGNOSIS — N18.30 CHRONIC RENAL INSUFFICIENCY, STAGE 3 (MODERATE) (HCC): ICD-10-CM

## 2024-09-17 DIAGNOSIS — E11.9 TYPE 2 DIABETES MELLITUS WITHOUT COMPLICATION, WITH LONG-TERM CURRENT USE OF INSULIN (HCC): ICD-10-CM

## 2024-09-17 DIAGNOSIS — R60.0 LOWER EXTREMITY EDEMA: ICD-10-CM

## 2024-09-17 DIAGNOSIS — I10 ESSENTIAL HYPERTENSION: ICD-10-CM

## 2024-09-17 DIAGNOSIS — E78.2 MIXED HYPERLIPIDEMIA: ICD-10-CM

## 2024-09-17 PROCEDURE — 99214 OFFICE O/P EST MOD 30 MIN: CPT | Performed by: INTERNAL MEDICINE

## 2024-09-17 PROCEDURE — 3044F HG A1C LEVEL LT 7.0%: CPT | Performed by: INTERNAL MEDICINE

## 2024-09-17 PROCEDURE — 1123F ACP DISCUSS/DSCN MKR DOCD: CPT | Performed by: INTERNAL MEDICINE

## 2024-09-17 PROCEDURE — 3074F SYST BP LT 130 MM HG: CPT | Performed by: INTERNAL MEDICINE

## 2024-09-17 PROCEDURE — 3078F DIAST BP <80 MM HG: CPT | Performed by: INTERNAL MEDICINE

## 2024-09-17 PROCEDURE — G0439 PPPS, SUBSEQ VISIT: HCPCS | Performed by: INTERNAL MEDICINE

## 2024-09-17 RX ORDER — ASPIRIN 81 MG/1
81 TABLET ORAL DAILY
Qty: 30 TABLET | Status: CANCELLED | OUTPATIENT
Start: 2024-09-17

## 2024-09-17 RX ORDER — INSULIN GLARGINE 100 [IU]/ML
10 INJECTION, SOLUTION SUBCUTANEOUS NIGHTLY
Qty: 5 ADJUSTABLE DOSE PRE-FILLED PEN SYRINGE | Refills: 3 | Status: SHIPPED | OUTPATIENT
Start: 2024-09-17 | End: 2024-09-19

## 2024-09-17 RX ORDER — GLIPIZIDE 10 MG/1
10 TABLET ORAL
Qty: 90 TABLET | Refills: 1 | Status: SHIPPED | OUTPATIENT
Start: 2024-09-17

## 2024-09-17 RX ORDER — LISINOPRIL 30 MG/1
30 TABLET ORAL DAILY
Qty: 90 TABLET | Refills: 3 | Status: SHIPPED | OUTPATIENT
Start: 2024-09-17

## 2024-09-17 RX ORDER — ATORVASTATIN CALCIUM 80 MG/1
TABLET, FILM COATED ORAL
Qty: 90 TABLET | Refills: 3 | Status: SHIPPED | OUTPATIENT
Start: 2024-09-17

## 2024-09-17 RX ORDER — FUROSEMIDE 20 MG
20 TABLET ORAL DAILY PRN
Qty: 45 TABLET | Refills: 1 | Status: SHIPPED | OUTPATIENT
Start: 2024-09-17

## 2024-09-17 RX ORDER — METOPROLOL SUCCINATE 50 MG/1
50 TABLET, EXTENDED RELEASE ORAL DAILY
Qty: 90 TABLET | Refills: 1 | Status: SHIPPED | OUTPATIENT
Start: 2024-09-17

## 2024-09-17 ASSESSMENT — PATIENT HEALTH QUESTIONNAIRE - PHQ9
SUM OF ALL RESPONSES TO PHQ9 QUESTIONS 1 & 2: 0
2. FEELING DOWN, DEPRESSED OR HOPELESS: NOT AT ALL
SUM OF ALL RESPONSES TO PHQ QUESTIONS 1-9: 0
SUM OF ALL RESPONSES TO PHQ QUESTIONS 1-9: 0
1. LITTLE INTEREST OR PLEASURE IN DOING THINGS: NOT AT ALL
SUM OF ALL RESPONSES TO PHQ QUESTIONS 1-9: 0
SUM OF ALL RESPONSES TO PHQ QUESTIONS 1-9: 0

## 2024-09-17 ASSESSMENT — LIFESTYLE VARIABLES
HOW OFTEN DO YOU HAVE A DRINK CONTAINING ALCOHOL: NEVER
HOW MANY STANDARD DRINKS CONTAINING ALCOHOL DO YOU HAVE ON A TYPICAL DAY: PATIENT DOES NOT DRINK

## 2024-09-18 LAB
A/G RATIO: 1.7 RATIO (ref 0.8–2.6)
ALBUMIN: 4 G/DL (ref 3.5–5.2)
ALP BLD-CCNC: 102 U/L (ref 23–144)
ALT SERPL-CCNC: 26 U/L (ref 0–60)
AST SERPL-CCNC: 19 U/L (ref 0–55)
BILIRUB SERPL-MCNC: 0.3 MG/DL (ref 0–1.2)
BUN / CREAT RATIO: 17 (ref 7–25)
BUN BLDV-MCNC: 24 MG/DL (ref 3–29)
CALCIUM SERPL-MCNC: 8.6 MG/DL (ref 8.5–10.5)
CHLORIDE BLD-SCNC: 106 MEQ/L (ref 96–110)
CHOLESTEROL, TOTAL: 103 MG/DL
CO2: 23 MEQ/L (ref 19–32)
CREAT SERPL-MCNC: 1.4 MG/DL (ref 0.5–1.2)
CREATININE URINE: 94.3 MG/DL
ESTIMATED GLOMERULAR FILTRATION RATE CREATININE EQUATION: 52 MLS/MIN/1.73M2
FASTING STATUS: ABNORMAL
GLOBULIN: 2.4 G/DL (ref 1.9–3.6)
GLUCOSE BLD-MCNC: 160 MG/DL (ref 70–99)
HBA1C MFR BLD: 9.2 %
HCT VFR BLD CALC: 34.1 % (ref 37.5–51)
HDLC SERPL-MCNC: 31 MG/DL
HEMOGLOBIN: 11 G/DL (ref 13–17.7)
LDL CHOLESTEROL: 55 MG/DL
MCH RBC QN AUTO: 31.1 PG (ref 26–34)
MCHC RBC AUTO-ENTMCNC: 32.3 G/DL (ref 30.7–35.5)
MCV RBC AUTO: 96.3 FL (ref 80–100)
MICROALBUMIN/CREAT 24H UR: 3250 MCG/DL
MICROALBUMIN/CREAT UR-RTO: 34 MCG/MG CREAT.
PDW BLD-RTO: 13.2 %
PLATELET # BLD: 200 K/UL (ref 140–400)
PMV BLD AUTO: 11 FL (ref 7.2–11.7)
POTASSIUM SERPL-SCNC: 4.6 MEQ/L (ref 3.4–5.3)
RBC # BLD: 3.54 M/UL (ref 4.14–5.8)
SODIUM BLD-SCNC: 141 MEQ/L (ref 135–148)
TOTAL PROTEIN: 6.4 G/DL (ref 6–8.3)
TRIGL SERPL-MCNC: 84 MG/DL
VLDLC SERPL CALC-MCNC: 17 MG/DL (ref 4–38)
WBC # BLD: 5.7 K/UL (ref 3.5–10.9)

## 2024-09-19 DIAGNOSIS — Z79.4 TYPE 2 DIABETES MELLITUS WITHOUT COMPLICATION, WITH LONG-TERM CURRENT USE OF INSULIN (HCC): ICD-10-CM

## 2024-09-19 DIAGNOSIS — E11.9 TYPE 2 DIABETES MELLITUS WITHOUT COMPLICATION, WITH LONG-TERM CURRENT USE OF INSULIN (HCC): ICD-10-CM

## 2024-09-19 RX ORDER — INSULIN GLARGINE 100 [IU]/ML
15 INJECTION, SOLUTION SUBCUTANEOUS NIGHTLY
Qty: 5 ADJUSTABLE DOSE PRE-FILLED PEN SYRINGE | Refills: 3 | Status: SHIPPED | OUTPATIENT
Start: 2024-09-19

## 2024-11-26 DIAGNOSIS — Z79.4 TYPE 2 DIABETES MELLITUS WITHOUT COMPLICATION, WITH LONG-TERM CURRENT USE OF INSULIN (HCC): ICD-10-CM

## 2024-11-26 DIAGNOSIS — E11.9 TYPE 2 DIABETES MELLITUS WITHOUT COMPLICATION, WITH LONG-TERM CURRENT USE OF INSULIN (HCC): ICD-10-CM

## 2024-12-04 ENCOUNTER — OFFICE VISIT (OUTPATIENT)
Dept: CARDIOLOGY CLINIC | Age: 79
End: 2024-12-04
Payer: MEDICARE

## 2024-12-04 VITALS
HEART RATE: 78 BPM | HEIGHT: 67 IN | DIASTOLIC BLOOD PRESSURE: 68 MMHG | BODY MASS INDEX: 27.21 KG/M2 | SYSTOLIC BLOOD PRESSURE: 124 MMHG | WEIGHT: 173.4 LBS

## 2024-12-04 DIAGNOSIS — I42.0 DILATED CARDIOMYOPATHY (HCC): ICD-10-CM

## 2024-12-04 DIAGNOSIS — I73.9 PAD (PERIPHERAL ARTERY DISEASE) (HCC): ICD-10-CM

## 2024-12-04 DIAGNOSIS — N18.30 CHRONIC RENAL INSUFFICIENCY, STAGE 3 (MODERATE) (HCC): ICD-10-CM

## 2024-12-04 DIAGNOSIS — I10 ESSENTIAL HYPERTENSION: ICD-10-CM

## 2024-12-04 DIAGNOSIS — I25.10 CORONARY ARTERY DISEASE INVOLVING NATIVE CORONARY ARTERY OF NATIVE HEART WITHOUT ANGINA PECTORIS: Primary | ICD-10-CM

## 2024-12-04 PROCEDURE — 99214 OFFICE O/P EST MOD 30 MIN: CPT | Performed by: INTERNAL MEDICINE

## 2024-12-04 PROCEDURE — 3078F DIAST BP <80 MM HG: CPT | Performed by: INTERNAL MEDICINE

## 2024-12-04 PROCEDURE — 1159F MED LIST DOCD IN RCRD: CPT | Performed by: INTERNAL MEDICINE

## 2024-12-04 PROCEDURE — 1123F ACP DISCUSS/DSCN MKR DOCD: CPT | Performed by: INTERNAL MEDICINE

## 2024-12-04 PROCEDURE — 3074F SYST BP LT 130 MM HG: CPT | Performed by: INTERNAL MEDICINE

## 2024-12-04 NOTE — PROGRESS NOTES
initially lost 20 lbs, and then gained 10 lsb, and then lost 4.5 lbs on adipex, however had urinary retention as a side effect of adipex, and hence stopped it, recommend to try KENIA over the counter, and work up and diet.recommend 1200 calories/day  DM: started on insulin,off jardiance ( payed 47 dollars co payment), continue metformin and glipizide and  off acotos ( acotos can cause fluid retention), Ha1c was 8.1, recommend to get it under 7  Dyslipidemia: continue statins  HTN: stable, continue lisinopril to 30mg daily medications, recommend to recheck at home and call me,  will increase lisinopril to 30mg daily  CKD: stable,   Health maintenance: [facility] All labs, medications and tests reviewed, continue all other medications of all above medical condition listed as is  All labs, medications and tests reviewed, continue all other medications of all above medical condition listed as is.

## 2025-02-10 ENCOUNTER — TELEMEDICINE (OUTPATIENT)
Dept: INTERNAL MEDICINE CLINIC | Age: 80
End: 2025-02-10
Payer: MEDICARE

## 2025-02-10 DIAGNOSIS — J40 BRONCHITIS: Primary | ICD-10-CM

## 2025-02-10 DIAGNOSIS — Z79.4 TYPE 2 DIABETES MELLITUS WITHOUT COMPLICATION, WITH LONG-TERM CURRENT USE OF INSULIN (HCC): ICD-10-CM

## 2025-02-10 DIAGNOSIS — E11.9 TYPE 2 DIABETES MELLITUS WITHOUT COMPLICATION, WITH LONG-TERM CURRENT USE OF INSULIN (HCC): ICD-10-CM

## 2025-02-10 PROCEDURE — 99213 OFFICE O/P EST LOW 20 MIN: CPT | Performed by: INTERNAL MEDICINE

## 2025-02-10 PROCEDURE — 1159F MED LIST DOCD IN RCRD: CPT | Performed by: INTERNAL MEDICINE

## 2025-02-10 RX ORDER — BENZONATATE 100 MG/1
100 CAPSULE ORAL 3 TIMES DAILY PRN
Qty: 30 CAPSULE | Refills: 0 | Status: SHIPPED | OUTPATIENT
Start: 2025-02-10 | End: 2025-02-20

## 2025-02-10 RX ORDER — AZITHROMYCIN 250 MG/1
TABLET, FILM COATED ORAL
Qty: 6 TABLET | Refills: 0 | Status: SHIPPED | OUTPATIENT
Start: 2025-02-10 | End: 2025-02-20

## 2025-02-10 RX ORDER — PREDNISONE 10 MG/1
TABLET ORAL
Qty: 20 TABLET | Refills: 0 | Status: SHIPPED | OUTPATIENT
Start: 2025-02-10

## 2025-02-10 SDOH — ECONOMIC STABILITY: FOOD INSECURITY: WITHIN THE PAST 12 MONTHS, YOU WORRIED THAT YOUR FOOD WOULD RUN OUT BEFORE YOU GOT MONEY TO BUY MORE.: NEVER TRUE

## 2025-02-10 SDOH — ECONOMIC STABILITY: FOOD INSECURITY: WITHIN THE PAST 12 MONTHS, THE FOOD YOU BOUGHT JUST DIDN'T LAST AND YOU DIDN'T HAVE MONEY TO GET MORE.: NEVER TRUE

## 2025-02-10 ASSESSMENT — PATIENT HEALTH QUESTIONNAIRE - PHQ9
1. LITTLE INTEREST OR PLEASURE IN DOING THINGS: NOT AT ALL
SUM OF ALL RESPONSES TO PHQ QUESTIONS 1-9: 0
SUM OF ALL RESPONSES TO PHQ9 QUESTIONS 1 & 2: 0
2. FEELING DOWN, DEPRESSED OR HOPELESS: NOT AT ALL

## 2025-02-10 NOTE — PROGRESS NOTES
Documentation:  I communicated with the patient and/or health care decision maker about cough.   Details of this discussion including any medical advice provided:     Pt complains of 2-3 days HA, rhinorrhea, nasal congestion,cough productive of clear mucous, sinus pain, wheezing.    He denies ear pain, SOB, F/C, N/V, diarrhea.     He has used OTC cough syrup, tylenol to treat these symptoms.    Sugars have been 120s.    PLAN - Tx bronchitis with prednisone, tessalon,and azith   - pt will watch sugars carefully, increase water,decrease carbs, call if sugars > 200 while on the prednisone        Total Time: minutes: 11-20 minutes    Tristen Workman was evaluated through a synchronous (real-time) audio encounter. Patient identification was verified at the start of the visit. He (or guardian if applicable) is aware that this is a billable service, which includes applicable co-pays. This visit was conducted with the patient's (and/or legal guardian's) verbal consent. He has not had a related appointment within my department in the past 7 days or scheduled within the next 24 hours.   The patient was located at Home: [address] [place] [address].  The provider was located at Facility (Appt Dept): [address] [place] [address].  Confirm you are appropriately licensed, registered, or certified to deliver care in the state where the patient is located as indicated above. If you are not or unsure, please re-schedule the visit: Yes, I confirm.     Note: not billable if this call serves to triage the patient into an appointment for the relevant concern    Tristen Workman is a 79 y.o. male evaluated via telephone on 2/10/2025 for Headache, Cough, Drainage, Congestion, Wheezing, and Discuss Medications (Causing joint pain)  .        BEBO GRAF MD

## 2025-02-12 ENCOUNTER — TELEPHONE (OUTPATIENT)
Dept: INTERNAL MEDICINE CLINIC | Age: 80
End: 2025-02-12

## 2025-02-12 DIAGNOSIS — J40 BRONCHITIS: Primary | ICD-10-CM

## 2025-02-12 NOTE — TELEPHONE ENCOUNTER
Patient called in stating that the prednisone, zpack, & tessalon are not helping him. States he is feeling worse than he was in the beginning. Would like to know if there is anything else he can try or do.

## 2025-02-12 NOTE — TELEPHONE ENCOUNTER
called and informed patient. CXR ordered. Patient states he will have this done tomorrow.     Patient states his blood sugars have been around 245 the last couple mornings. States he was to inform you if it went over 226.

## 2025-02-13 ENCOUNTER — HOSPITAL ENCOUNTER (OUTPATIENT)
Age: 80
Discharge: HOME | End: 2025-02-13
Payer: MEDICARE

## 2025-02-13 ENCOUNTER — HOSPITAL ENCOUNTER (OUTPATIENT)
Dept: GENERAL RADIOLOGY | Age: 80
Discharge: HOME | End: 2025-02-13
Payer: MEDICARE

## 2025-02-13 DIAGNOSIS — J40 BRONCHITIS: ICD-10-CM

## 2025-02-13 PROCEDURE — 71046 X-RAY EXAM CHEST 2 VIEWS: CPT

## 2025-03-17 ENCOUNTER — HOSPITAL ENCOUNTER (OUTPATIENT)
Age: 80
Discharge: HOME | End: 2025-03-17
Payer: MEDICARE

## 2025-03-17 ENCOUNTER — OFFICE VISIT (OUTPATIENT)
Dept: INTERNAL MEDICINE CLINIC | Age: 80
End: 2025-03-17
Payer: MEDICARE

## 2025-03-17 VITALS
BODY MASS INDEX: 27.13 KG/M2 | OXYGEN SATURATION: 95 % | HEART RATE: 67 BPM | SYSTOLIC BLOOD PRESSURE: 144 MMHG | WEIGHT: 173.2 LBS | DIASTOLIC BLOOD PRESSURE: 74 MMHG

## 2025-03-17 DIAGNOSIS — E11.9 TYPE 2 DIABETES MELLITUS WITHOUT COMPLICATION, WITH LONG-TERM CURRENT USE OF INSULIN: ICD-10-CM

## 2025-03-17 DIAGNOSIS — N18.30 CHRONIC RENAL INSUFFICIENCY, STAGE 3 (MODERATE) (HCC): ICD-10-CM

## 2025-03-17 DIAGNOSIS — I10 ESSENTIAL HYPERTENSION: ICD-10-CM

## 2025-03-17 DIAGNOSIS — I50.22 CHRONIC SYSTOLIC CONGESTIVE HEART FAILURE (HCC): Primary | ICD-10-CM

## 2025-03-17 DIAGNOSIS — E78.2 MIXED HYPERLIPIDEMIA: ICD-10-CM

## 2025-03-17 DIAGNOSIS — E11.22 TYPE 2 DIABETES MELLITUS WITH STAGE 3A CHRONIC KIDNEY DISEASE, WITH LONG-TERM CURRENT USE OF INSULIN (HCC): ICD-10-CM

## 2025-03-17 DIAGNOSIS — Z79.4 TYPE 2 DIABETES MELLITUS WITHOUT COMPLICATION, WITH LONG-TERM CURRENT USE OF INSULIN: ICD-10-CM

## 2025-03-17 DIAGNOSIS — R60.0 LOWER EXTREMITY EDEMA: ICD-10-CM

## 2025-03-17 DIAGNOSIS — N18.31 TYPE 2 DIABETES MELLITUS WITH STAGE 3A CHRONIC KIDNEY DISEASE, WITH LONG-TERM CURRENT USE OF INSULIN (HCC): ICD-10-CM

## 2025-03-17 DIAGNOSIS — Z79.4 TYPE 2 DIABETES MELLITUS WITH STAGE 3A CHRONIC KIDNEY DISEASE, WITH LONG-TERM CURRENT USE OF INSULIN (HCC): ICD-10-CM

## 2025-03-17 LAB
ALBUMIN SERPL-MCNC: 3.9 G/DL (ref 3.4–5)
ALBUMIN/GLOB SERPL: 1.2 {RATIO} (ref 1.1–2.2)
ALP SERPL-CCNC: 92 U/L (ref 40–129)
ALT SERPL-CCNC: 28 U/L (ref 10–40)
ANION GAP SERPL CALCULATED.3IONS-SCNC: 12 MMOL/L (ref 9–17)
AST SERPL-CCNC: 27 U/L (ref 15–37)
BILIRUB SERPL-MCNC: 0.5 MG/DL (ref 0–1)
BUN SERPL-MCNC: 23 MG/DL (ref 7–20)
CALCIUM SERPL-MCNC: 9.2 MG/DL (ref 8.3–10.6)
CHLORIDE SERPL-SCNC: 107 MMOL/L (ref 99–110)
CHOLEST SERPL-MCNC: 107 MG/DL (ref 125–199)
CO2 SERPL-SCNC: 22 MMOL/L (ref 21–32)
CREAT SERPL-MCNC: 1.3 MG/DL (ref 0.8–1.3)
ERYTHROCYTE [DISTWIDTH] IN BLOOD BY AUTOMATED COUNT: 14.6 % (ref 11.7–14.9)
EST. AVERAGE GLUCOSE BLD GHB EST-MCNC: 155 MG/DL
GFR, ESTIMATED: 52 ML/MIN/1.73M2
GLUCOSE SERPL-MCNC: 110 MG/DL (ref 74–99)
HBA1C MFR BLD: 7 % (ref 4.2–6.3)
HCT VFR BLD AUTO: 38.7 % (ref 42–52)
HDLC SERPL-MCNC: 39 MG/DL
HGB BLD-MCNC: 11.7 G/DL (ref 13.5–18)
LDLC SERPL CALC-MCNC: 53 MG/DL
MCH RBC QN AUTO: 30.7 PG (ref 27–31)
MCHC RBC AUTO-ENTMCNC: 30.2 G/DL (ref 32–36)
MCV RBC AUTO: 101.6 FL (ref 78–100)
PLATELET # BLD AUTO: 201 K/UL (ref 140–440)
PMV BLD AUTO: 10.8 FL (ref 7.5–11.1)
POTASSIUM SERPL-SCNC: 4.8 MMOL/L (ref 3.5–5.1)
PROT SERPL-MCNC: 7.2 G/DL (ref 6.4–8.2)
RBC # BLD AUTO: 3.81 M/UL (ref 4.6–6.2)
SODIUM SERPL-SCNC: 141 MMOL/L (ref 136–145)
TRIGL SERPL-MCNC: 77 MG/DL
WBC OTHER # BLD: 6.8 K/UL (ref 4–10.5)

## 2025-03-17 PROCEDURE — 83036 HEMOGLOBIN GLYCOSYLATED A1C: CPT

## 2025-03-17 PROCEDURE — 99214 OFFICE O/P EST MOD 30 MIN: CPT | Performed by: INTERNAL MEDICINE

## 2025-03-17 PROCEDURE — 1123F ACP DISCUSS/DSCN MKR DOCD: CPT | Performed by: INTERNAL MEDICINE

## 2025-03-17 PROCEDURE — 1159F MED LIST DOCD IN RCRD: CPT | Performed by: INTERNAL MEDICINE

## 2025-03-17 PROCEDURE — 80061 LIPID PANEL: CPT

## 2025-03-17 PROCEDURE — 3078F DIAST BP <80 MM HG: CPT | Performed by: INTERNAL MEDICINE

## 2025-03-17 PROCEDURE — 3077F SYST BP >= 140 MM HG: CPT | Performed by: INTERNAL MEDICINE

## 2025-03-17 PROCEDURE — 80053 COMPREHEN METABOLIC PANEL: CPT

## 2025-03-17 PROCEDURE — 85027 COMPLETE CBC AUTOMATED: CPT

## 2025-03-17 PROCEDURE — G2211 COMPLEX E/M VISIT ADD ON: HCPCS | Performed by: INTERNAL MEDICINE

## 2025-03-17 RX ORDER — LANCETS
1 EACH MISCELLANEOUS 2 TIMES DAILY
Qty: 200 EACH | Refills: 3 | Status: SHIPPED | OUTPATIENT
Start: 2025-03-17

## 2025-03-17 RX ORDER — ASPIRIN 81 MG/1
81 TABLET ORAL DAILY
Qty: 30 TABLET | Refills: 3 | Status: SHIPPED | OUTPATIENT
Start: 2025-03-17 | End: 2025-03-17 | Stop reason: SDUPTHER

## 2025-03-17 RX ORDER — FUROSEMIDE 20 MG/1
20 TABLET ORAL DAILY PRN
Qty: 45 TABLET | Refills: 1 | Status: SHIPPED | OUTPATIENT
Start: 2025-03-17

## 2025-03-17 RX ORDER — INSULIN GLARGINE 100 [IU]/ML
15 INJECTION, SOLUTION SUBCUTANEOUS NIGHTLY
Qty: 5 ADJUSTABLE DOSE PRE-FILLED PEN SYRINGE | Refills: 3 | Status: SHIPPED | OUTPATIENT
Start: 2025-03-17

## 2025-03-17 RX ORDER — LISINOPRIL 30 MG/1
30 TABLET ORAL DAILY
Qty: 90 TABLET | Refills: 3 | Status: SHIPPED | OUTPATIENT
Start: 2025-03-17

## 2025-03-17 RX ORDER — METOPROLOL SUCCINATE 50 MG/1
50 TABLET, EXTENDED RELEASE ORAL DAILY
Qty: 90 TABLET | Refills: 1 | Status: SHIPPED | OUTPATIENT
Start: 2025-03-17

## 2025-03-17 RX ORDER — GLIPIZIDE 10 MG/1
10 TABLET ORAL
Qty: 90 TABLET | Refills: 1 | Status: SHIPPED | OUTPATIENT
Start: 2025-03-17

## 2025-03-17 RX ORDER — LATANOPROST 50 UG/ML
1 SOLUTION/ DROPS OPHTHALMIC NIGHTLY
Qty: 1 EACH | Refills: 6 | Status: CANCELLED | OUTPATIENT
Start: 2025-03-17

## 2025-03-17 RX ORDER — FLUTICASONE PROPIONATE 50 MCG
SPRAY, SUSPENSION (ML) NASAL
Qty: 16 G | Refills: 5 | Status: SHIPPED | OUTPATIENT
Start: 2025-03-17

## 2025-03-17 RX ORDER — ASPIRIN 81 MG/1
81 TABLET ORAL DAILY
Qty: 90 TABLET | Refills: 3 | Status: SHIPPED | OUTPATIENT
Start: 2025-03-17

## 2025-03-17 RX ORDER — GLUCOSAMINE HCL/CHONDROITIN SU 500-400 MG
CAPSULE ORAL
Qty: 200 STRIP | Refills: 3 | Status: SHIPPED | OUTPATIENT
Start: 2025-03-17

## 2025-03-17 RX ORDER — BLOOD SUGAR DIAGNOSTIC
STRIP MISCELLANEOUS
Qty: 200 EACH | Refills: 0 | OUTPATIENT
Start: 2025-03-17

## 2025-03-17 RX ORDER — ATORVASTATIN CALCIUM 80 MG/1
TABLET, FILM COATED ORAL
Qty: 90 TABLET | Refills: 3 | Status: SHIPPED | OUTPATIENT
Start: 2025-03-17

## 2025-03-17 NOTE — PROGRESS NOTES
Tristen Montesinostt  1945 03/17/25    SUBJECTIVE:      Pt complains of rhinorrhea, sneezing, nasal congestion, PND, mild cough with clear mucous. He denies F/C. He went to urgent care, was given doxy with no benefit. I had him radha prednisone, azith with no benefit. He has been using saline, nasal steroid with little benefit.     Patient compliant with medications for diabetes. Blood sugars have averaged around 100-140, with pt checking blood sugar 2 time daily. Patient has had no episodes of significant hypoglycemia.     Patient denies any chest pain, shortness of breath, myalgias, Patient is tolerating cholesterol medications without difficulty.     The patient is taking hypertensive medications compliantly without side effects.  Denies chest pain, dyspnea, edema, or TIA's. Blood pressure has been 137/77.     He denies any SOB, edema. He is on metoprolol and lisinopril for the systolic CHF. SGLT-2 antagonists were to expensive     OBJECTIVE:    BP (!) 144/74   Pulse 67   Wt 78.6 kg (173 lb 3.2 oz)   SpO2 95%   BMI 27.13 kg/m²     Physical Exam  Constitutional:       Appearance: He is well-developed.   Eyes:      General: No scleral icterus.     Conjunctiva/sclera: Conjunctivae normal.   Neck:      Thyroid: No thyromegaly.      Vascular: No JVD.      Trachea: No tracheal deviation.   Cardiovascular:      Rate and Rhythm: Normal rate and regular rhythm.      Heart sounds: Normal heart sounds.   Pulmonary:      Effort: Pulmonary effort is normal. No respiratory distress.      Breath sounds: Normal breath sounds.   Abdominal:      General: There is no distension.      Palpations: Abdomen is soft. There is no mass.      Tenderness: There is no abdominal tenderness. There is no guarding or rebound.   Musculoskeletal:      Cervical back: Neck supple.   Lymphadenopathy:      Cervical: No cervical adenopathy.   Skin:     Nails: There is no clubbing.   Neurological:      Mental Status: He is alert and oriented to

## 2025-03-18 ENCOUNTER — RESULTS FOLLOW-UP (OUTPATIENT)
Dept: INTERNAL MEDICINE CLINIC | Age: 80
End: 2025-03-18

## 2025-06-10 DIAGNOSIS — R60.0 LOWER EXTREMITY EDEMA: ICD-10-CM

## 2025-06-10 RX ORDER — FUROSEMIDE 20 MG/1
TABLET ORAL
Qty: 90 TABLET | Refills: 0 | Status: SHIPPED | OUTPATIENT
Start: 2025-06-10

## 2025-06-11 ENCOUNTER — OFFICE VISIT (OUTPATIENT)
Dept: CARDIOLOGY CLINIC | Age: 80
End: 2025-06-11
Payer: MEDICARE

## 2025-06-11 VITALS
HEIGHT: 67 IN | DIASTOLIC BLOOD PRESSURE: 68 MMHG | SYSTOLIC BLOOD PRESSURE: 126 MMHG | HEART RATE: 62 BPM | WEIGHT: 168 LBS | BODY MASS INDEX: 26.37 KG/M2

## 2025-06-11 DIAGNOSIS — I25.10 CORONARY ARTERY DISEASE INVOLVING NATIVE CORONARY ARTERY OF NATIVE HEART WITHOUT ANGINA PECTORIS: Primary | ICD-10-CM

## 2025-06-11 DIAGNOSIS — N17.0 ACUTE RENAL FAILURE WITH TUBULAR NECROSIS: ICD-10-CM

## 2025-06-11 PROCEDURE — 3074F SYST BP LT 130 MM HG: CPT | Performed by: INTERNAL MEDICINE

## 2025-06-11 PROCEDURE — 1123F ACP DISCUSS/DSCN MKR DOCD: CPT | Performed by: INTERNAL MEDICINE

## 2025-06-11 PROCEDURE — 93000 ELECTROCARDIOGRAM COMPLETE: CPT | Performed by: INTERNAL MEDICINE

## 2025-06-11 PROCEDURE — 3078F DIAST BP <80 MM HG: CPT | Performed by: INTERNAL MEDICINE

## 2025-06-11 PROCEDURE — 99214 OFFICE O/P EST MOD 30 MIN: CPT | Performed by: INTERNAL MEDICINE

## 2025-06-11 NOTE — PROGRESS NOTES
noted, muscle strength and tone are normal  Skin: no ulcer,no scar,no stasis dermatitis   Neurologic - alert oriented times 3,Cranial nerves II through XII are grossly intact.  There were no gross focal neurologic abnormalities.   Psychiatric: normal mood and affect    No results found for: \"CKTOTAL\", \"CKMB\", \"CKMBINDEX\", \"TROPONINI\"  BNP:  No results found for: \"BNP\"  PT/INR:  No results found for: \"PTINR\"  Lab Results   Component Value Date    LABA1C 7.0 (H) 03/17/2025    LABA1C 9.2 (H) 09/18/2024     Lab Results   Component Value Date    CHOL 107 (L) 03/17/2025    TRIG 77 03/17/2025    HDL 39 (L) 03/17/2025    LDLDIRECT 55 05/12/2020     Lab Results   Component Value Date    ALT 28 03/17/2025    AST 27 03/17/2025     TSH:  No results found for: \"TSH\"    EKG:nsr  Impression:  Tristen is a 80 y.o.year old who  has a past medical history of CAD (coronary artery disease), Cerebral artery occlusion with cerebral infarction (HCC), Chronic back pain, Diabetes mellitus (HCC), Erectile dysfunction, Gastritis, Glaucoma, H/O cardiovascular stress test, H/O Doppler echocardiogram, H/O echocardiogram, H/O left heart catheterization by cutdown, Hx of cardiovascular stress test, Hx of echocardiogram, Hyperlipidemia, Hypertension, Polyp of gallbladder, PVD (peripheral vascular disease), Teeth missing, and Wears glasses. and presents with     Plan:  Renal insufficiency: creatinine was 1.69 on 4/2/25 and prior to it it was 1.3, recheck chem 7, he is on lasix 20mg three times a week and also on lisinopril, will follow up  Chest pain:RESOLVED, had covid 19 last time, had normal stress test and echo, will not recommend LHC  Hyperkalemia: as per patient it was false reading, recommend to recheck  Bradycardia:reduced lopressor to 25mg bid  CAD and cardiomyopathy :had covid vaccine,  LHC showed mild LAD disease and ventriculogram showed normal LVEF.,Continue aspirin,continue statins, ACEinhibitors, betablockers, repeat echo this time

## 2025-06-12 ENCOUNTER — HOSPITAL ENCOUNTER (OUTPATIENT)
Age: 80
Discharge: HOME | End: 2025-06-12
Payer: MEDICARE

## 2025-06-12 DIAGNOSIS — N17.0 ACUTE RENAL FAILURE WITH TUBULAR NECROSIS: ICD-10-CM

## 2025-06-12 LAB
ANION GAP SERPL CALCULATED.3IONS-SCNC: 12 MMOL/L (ref 9–17)
BUN SERPL-MCNC: 32 MG/DL (ref 7–20)
CALCIUM SERPL-MCNC: 9.4 MG/DL (ref 8.3–10.6)
CHLORIDE SERPL-SCNC: 107 MMOL/L (ref 99–110)
CO2 SERPL-SCNC: 23 MMOL/L (ref 21–32)
CREAT SERPL-MCNC: 1.6 MG/DL (ref 0.8–1.3)
GFR, ESTIMATED: 41 ML/MIN/1.73M2
GLUCOSE SERPL-MCNC: 94 MG/DL (ref 74–99)
POTASSIUM SERPL-SCNC: 4.7 MMOL/L (ref 3.5–5.1)
SODIUM SERPL-SCNC: 142 MMOL/L (ref 136–145)

## 2025-06-12 PROCEDURE — 80048 BASIC METABOLIC PNL TOTAL CA: CPT

## 2025-06-12 PROCEDURE — 36415 COLL VENOUS BLD VENIPUNCTURE: CPT

## 2025-07-02 ENCOUNTER — OFFICE VISIT (OUTPATIENT)
Dept: CARDIOLOGY CLINIC | Age: 80
End: 2025-07-02
Payer: MEDICARE

## 2025-07-02 VITALS
SYSTOLIC BLOOD PRESSURE: 134 MMHG | DIASTOLIC BLOOD PRESSURE: 60 MMHG | OXYGEN SATURATION: 98 % | WEIGHT: 172 LBS | HEIGHT: 67 IN | BODY MASS INDEX: 27 KG/M2 | HEART RATE: 68 BPM

## 2025-07-02 DIAGNOSIS — I42.9 CARDIOMYOPATHY, UNSPECIFIED TYPE (HCC): Primary | ICD-10-CM

## 2025-07-02 PROCEDURE — 3075F SYST BP GE 130 - 139MM HG: CPT | Performed by: INTERNAL MEDICINE

## 2025-07-02 PROCEDURE — 99214 OFFICE O/P EST MOD 30 MIN: CPT | Performed by: INTERNAL MEDICINE

## 2025-07-02 PROCEDURE — 3078F DIAST BP <80 MM HG: CPT | Performed by: INTERNAL MEDICINE

## 2025-07-02 PROCEDURE — 1159F MED LIST DOCD IN RCRD: CPT | Performed by: INTERNAL MEDICINE

## 2025-07-02 PROCEDURE — 1123F ACP DISCUSS/DSCN MKR DOCD: CPT | Performed by: INTERNAL MEDICINE

## 2025-07-02 NOTE — PROGRESS NOTES
Sam Galarza MD        OFFICE  FOLLOWUP NOTE    Chief complaints: patient is here for management of CAD, DM, HTN, dyspnea, DYSLPIDEMIA, CKD and obesity, covid 19     Subjective: patient feels better, no chest pain, no shortness of breath, no dizziness, no palpitations    AI Ramon is a 80 y.o.year old who  has a past medical history of CAD (coronary artery disease), Cerebral artery occlusion with cerebral infarction (HCC), Chronic back pain, Diabetes mellitus (HCC), Erectile dysfunction, Gastritis, Glaucoma, H/O cardiovascular stress test, H/O Doppler echocardiogram, H/O echocardiogram, H/O left heart catheterization by cutdown, Hx of cardiovascular stress test, Hx of echocardiogram, Hyperlipidemia, Hypertension, Polyp of gallbladder, PVD (peripheral vascular disease), Teeth missing, and Wears glasses. and presents for management of CAD, DM, HTN, dyspnea, DYSLPIDEMIA, CKD and obesity, covid 19 , which are well controlled      Current Outpatient Medications   Medication Sig Dispense Refill    Accu-Chek Softclix Lancets MISC 1 Device by Does not apply route in the morning and at bedtime 200 each 3    atorvastatin (LIPITOR) 80 MG tablet TAKE 1 TABLET BY MOUTH AT BEDTIME 90 tablet 3    blood glucose monitor strips Test 2 times a day Dx: E11.9 200 strip 3    fluticasone (FLONASE) 50 MCG/ACT nasal spray Use 2 spray(s) in each nostril once daily 16 g 5    glipiZIDE (GLUCOTROL) 10 MG tablet Take 1 tablet by mouth every morning (before breakfast) 90 tablet 1    insulin glargine (LANTUS SOLOSTAR) 100 UNIT/ML injection pen Inject 15 Units into the skin nightly 5 Adjustable Dose Pre-filled Pen Syringe 3    lisinopril (PRINIVIL;ZESTRIL) 30 MG tablet Take 1 tablet by mouth daily 90 tablet 3    metFORMIN (GLUCOPHAGE) 500 MG tablet Take 1 tablet by mouth 2 times daily (with meals) 360 tablet 1    metoprolol succinate (TOPROL XL) 50 MG extended release tablet Take 1 tablet by mouth daily 90 tablet 1    aspirin

## 2025-07-27 DIAGNOSIS — Z79.4 TYPE 2 DIABETES MELLITUS WITHOUT COMPLICATION, WITH LONG-TERM CURRENT USE OF INSULIN (HCC): ICD-10-CM

## 2025-07-27 DIAGNOSIS — E11.9 TYPE 2 DIABETES MELLITUS WITHOUT COMPLICATION, WITH LONG-TERM CURRENT USE OF INSULIN (HCC): ICD-10-CM

## 2025-07-28 RX ORDER — GLIPIZIDE 10 MG/1
TABLET ORAL
Qty: 90 TABLET | Refills: 0 | Status: SHIPPED | OUTPATIENT
Start: 2025-07-28

## 2025-08-04 DIAGNOSIS — I10 ESSENTIAL HYPERTENSION: ICD-10-CM

## 2025-08-04 RX ORDER — METOPROLOL SUCCINATE 50 MG/1
50 TABLET, EXTENDED RELEASE ORAL DAILY
Qty: 90 TABLET | Refills: 0 | Status: SHIPPED | OUTPATIENT
Start: 2025-08-04